# Patient Record
Sex: FEMALE | Race: WHITE | NOT HISPANIC OR LATINO
[De-identification: names, ages, dates, MRNs, and addresses within clinical notes are randomized per-mention and may not be internally consistent; named-entity substitution may affect disease eponyms.]

---

## 2017-03-28 ENCOUNTER — APPOINTMENT (OUTPATIENT)
Dept: HEART AND VASCULAR | Facility: CLINIC | Age: 72
End: 2017-03-28

## 2017-04-13 ENCOUNTER — APPOINTMENT (OUTPATIENT)
Dept: HEART AND VASCULAR | Facility: CLINIC | Age: 72
End: 2017-04-13

## 2017-04-13 VITALS
SYSTOLIC BLOOD PRESSURE: 130 MMHG | HEART RATE: 81 BPM | BODY MASS INDEX: 25.13 KG/M2 | DIASTOLIC BLOOD PRESSURE: 80 MMHG | HEIGHT: 60 IN | WEIGHT: 128 LBS

## 2017-04-14 LAB
T3FREE SERPL-MCNC: 2.05 PG/ML
T4 FREE SERPL-MCNC: 1.4 NG/DL

## 2017-05-25 RX ORDER — LOVASTATIN 20 MG/1
20 TABLET ORAL
Qty: 30 | Refills: 6 | Status: DISCONTINUED | COMMUNITY
Start: 2017-04-14 | End: 2017-05-25

## 2017-11-28 ENCOUNTER — APPOINTMENT (OUTPATIENT)
Dept: HEART AND VASCULAR | Facility: CLINIC | Age: 72
End: 2017-11-28
Payer: MEDICARE

## 2017-11-28 VITALS — SYSTOLIC BLOOD PRESSURE: 118 MMHG | DIASTOLIC BLOOD PRESSURE: 80 MMHG

## 2017-11-28 DIAGNOSIS — Z00.00 ENCOUNTER FOR GENERAL ADULT MEDICAL EXAMINATION W/OUT ABNORMAL FINDINGS: ICD-10-CM

## 2017-11-28 DIAGNOSIS — I25.10 ATHEROSCLEROTIC HEART DISEASE OF NATIVE CORONARY ARTERY W/OUT ANGINA PECTORIS: ICD-10-CM

## 2017-11-28 DIAGNOSIS — I20.9 ANGINA PECTORIS, UNSPECIFIED: ICD-10-CM

## 2017-11-28 DIAGNOSIS — C34.90 MALIGNANT NEOPLASM OF UNSPECIFIED PART OF UNSPECIFIED BRONCHUS OR LUNG: ICD-10-CM

## 2017-11-28 PROCEDURE — 93351 STRESS TTE COMPLETE: CPT

## 2017-11-28 PROCEDURE — 99214 OFFICE O/P EST MOD 30 MIN: CPT | Mod: 25

## 2017-11-29 LAB
ALBUMIN SERPL ELPH-MCNC: 4.7 G/DL
ALP BLD-CCNC: 105 U/L
ALT SERPL-CCNC: 29 U/L
ANION GAP SERPL CALC-SCNC: 19 MMOL/L
AST SERPL-CCNC: 25 U/L
BASOPHILS # BLD AUTO: 0.03 K/UL
BASOPHILS NFR BLD AUTO: 0.4 %
BILIRUB SERPL-MCNC: 1.1 MG/DL
BUN SERPL-MCNC: 18 MG/DL
CALCIUM SERPL-MCNC: 9.9 MG/DL
CHLORIDE SERPL-SCNC: 101 MMOL/L
CHOLEST SERPL-MCNC: 234 MG/DL
CHOLEST/HDLC SERPL: 4.4 RATIO
CO2 SERPL-SCNC: 22 MMOL/L
CREAT SERPL-MCNC: 0.88 MG/DL
EOSINOPHIL # BLD AUTO: 0.15 K/UL
EOSINOPHIL NFR BLD AUTO: 2.1 %
GLUCOSE SERPL-MCNC: 160 MG/DL
HBA1C MFR BLD HPLC: 8.8 %
HCT VFR BLD CALC: 43.6 %
HDLC SERPL-MCNC: 53 MG/DL
IMM GRANULOCYTES NFR BLD AUTO: 0 %
LDLC SERPL CALC-MCNC: 112 MG/DL
LYMPHOCYTES # BLD AUTO: 1.82 K/UL
LYMPHOCYTES NFR BLD AUTO: 25.2 %
MAN DIFF?: NORMAL
MCHC RBC-ENTMCNC: 28.4 PG
MCHC RBC-ENTMCNC: 32.8 GM/DL
MCV RBC AUTO: 86.5 FL
MONOCYTES # BLD AUTO: 0.38 K/UL
MONOCYTES NFR BLD AUTO: 5.3 %
NEUTROPHILS # BLD AUTO: 4.83 K/UL
NEUTROPHILS NFR BLD AUTO: 67 %
PLATELET # BLD AUTO: 302 K/UL
POTASSIUM SERPL-SCNC: 4.6 MMOL/L
PROT SERPL-MCNC: 7.4 G/DL
RBC # BLD: 5.04 M/UL
SODIUM SERPL-SCNC: 142 MMOL/L
TRIGL SERPL-MCNC: 345 MG/DL
TSH SERPL-ACNC: 1.3 UIU/ML
WBC # FLD AUTO: 7.21 K/UL

## 2017-11-30 PROBLEM — I20.9 ANGINA PECTORIS: Status: ACTIVE | Noted: 2017-04-13

## 2018-02-10 DIAGNOSIS — Z00.00 ENCOUNTER FOR GENERAL ADULT MEDICAL EXAMINATION W/OUT ABNORMAL FINDINGS: ICD-10-CM

## 2018-03-19 PROBLEM — Z00.00 PHYSICAL EXAM, ANNUAL: Status: ACTIVE | Noted: 2018-03-19

## 2018-03-27 ENCOUNTER — APPOINTMENT (OUTPATIENT)
Dept: HEART AND VASCULAR | Facility: CLINIC | Age: 73
End: 2018-03-27

## 2022-09-14 ENCOUNTER — APPOINTMENT (OUTPATIENT)
Dept: OPHTHALMOLOGY | Facility: CLINIC | Age: 77
End: 2022-09-14

## 2022-12-06 VITALS — HEIGHT: 61 IN | WEIGHT: 115.96 LBS

## 2022-12-06 NOTE — H&P ADULT - HISTORY OF PRESENT ILLNESS
Cardiologist: Dr. Olayinka Maravilla  Escort:  Pharmacy:  COVID:    *Verify Meds*  [facility] - no office note (call [phone])    78 yo F with a PMH of ____ who presented to outpatient cardiologist Dr. Maravilla with complaints of ______. CCTA 12/2/22:  Agatston CAC score 1383 (90th-100th percentile for age/gender), severe 3V CAD (full report below), indeterminate anterior RUL 7mm nodule, medial LLL calcifications and pleural thickening. TTE 8/25/22: LVEF normal, moderate LVH, diastolic filling pattern indicated impaired relaxation, nonsignificant valvular disease.     CCTA 12/2/22: Agatston CAC score 1383 (90th-100th percentile for age/gender), p/mLAD calcified/soft plaque w/ severe stenosis, D1 calcified and soft plauqes may cause severe stenosis, LCx , OM1 calcified and soft plauqes may cause severe stenosis, OM2 calcified and soft plauqes may cause severe stenosis, p/m/dRCA multifocal calcified and soft plauqes may cause severe stenosis, indeterminate anterior RUL 7mm nodule, medial LLL calcifications and pleural thickening Cardiologist: Dr. Olayinka Maravilla  Escort: TBD  Pharmacy:  [address] [place] [address]  COVID: Pt to bring     *Verify Meds*    78 yo F former smoker with a PMH of HLD (on Repatha, no statin), CAD (s/p PTCA in 2012), DM-II, Lung CA s/p partial lobectomy, Hypothyroidism who presented to outpatient cardiologist Dr. Maravilla with complaints of intermittent substernal sharp chest pain accompanied by WRIGHT with ambulating uphill for the past 6-9 months. Pt underwent CCTA 12/2/22: Agatston CAC score 1383 (90th-100th percentile for age/gender), severe 3V CAD (full report below), indeterminate anterior RUL 7mm nodule, medial LLL calcifications and pleural thickening. TTE 8/25/22: LVEF normal, moderate LVH, diastolic filling pattern indicated impaired relaxation, nonsignificant valvular disease. In light of pt's risk factors, CCS class III anginal symptoms and abnormal CCTA pt is referred to [facility] for cardiac catheterization with possible intervention.     CCTA 12/2/22: Agatston CAC score 1383 (90th-100th percentile for age/gender), p/mLAD calcified/soft plaque w/ severe stenosis, D1 calcified and soft plaques may cause severe stenosis, LCx , OM1 calcified and soft plaques may cause severe stenosis, OM2 calcified and soft plaques may cause severe stenosis, p/m/dRCA multifocal calcified and soft plaques may cause severe stenosis, indeterminate anterior RUL 7mm nodule, medial LLL calcifications and pleural thickening Cardiologist: Dr. Olayinka Maravilla  Escort: TBD  Pharmacy:  [address] [place] [address]  COVID: 12/7/22 @ [address]    *Verify Meds*    78 yo F former smoker with a PMH of HLD (on Repatha, no statin), CAD (s/p PTCA in 2012), DM-II, Lung CA s/p partial lobectomy, Hypothyroidism who presented to outpatient cardiologist Dr. Maravilla with complaints of intermittent substernal sharp chest pain accompanied by WRIGHT with ambulating uphill for the past 6-9 months. Pt underwent CCTA 12/2/22: Agatston CAC score 1383 (90th-100th percentile for age/gender), severe 3V CAD (full report below), indeterminate anterior RUL 7mm nodule, medial LLL calcifications and pleural thickening. TTE 8/25/22: LVEF normal, moderate LVH, diastolic filling pattern indicated impaired relaxation, nonsignificant valvular disease. In light of pt's risk factors, CCS class III anginal symptoms and abnormal CCTA pt is referred to [facility] for cardiac catheterization with possible intervention.     CCTA 12/2/22: Agatston CAC score 1383 (90th-100th percentile for age/gender), p/mLAD calcified/soft plaque w/ severe stenosis, D1 calcified and soft plaques may cause severe stenosis, LCx , OM1 calcified and soft plaques may cause severe stenosis, OM2 calcified and soft plaques may cause severe stenosis, p/m/dRCA multifocal calcified and soft plaques may cause severe stenosis, indeterminate anterior RUL 7mm nodule, medial LLL calcifications and pleural thickening Cardiologist: Dr. Olayinka Maravilla  Escort: None  Pharmacy:  [address] [facility] verified w/ pharmacy  COVID: Negative 12/9 in results    76 yo F, former heavy smoker, with a PMH of HLD (on Repatha, no statin), CAD (s/p PTCA in 2012), DM-II, Lung CA s/p partial lobectomy, Hypothyroidism who presented to outpatient cardiologist Dr. Maravilla with complaints of intermittent substernal sharp chest pain accompanied by WRIGHT with ambulating uphill for the past 6-9 months. Pt underwent CCTA 12/2/22: Agatston CAC score 1383 (90th-100th percentile for age/gender), severe 3V CAD (full report below), indeterminate anterior RUL 7mm nodule, medial LLL calcifications and pleural thickening. TTE 8/25/22: LVEF normal, moderate LVH, diastolic filling pattern indicated impaired relaxation, nonsignificant valvular disease. In light of pt's risk factors, CCS class III anginal symptoms and abnormal CCTA pt is referred to [facility] for cardiac catheterization with possible intervention if clinically indicated.     CCTA 12/2/22: Agatston CAC score 1383 (90th-100th percentile for age/gender), p/mLAD calcified/soft plaque w/ severe stenosis, D1 calcified and soft plaques may cause severe stenosis, LCx , OM1 calcified and soft plaques may cause severe stenosis, OM2 calcified and soft plaques may cause severe stenosis, p/m/dRCA multifocal calcified and soft plaques may cause severe stenosis, indeterminate anterior RUL 7mm nodule, medial LLL calcifications and pleural thickening

## 2022-12-06 NOTE — H&P ADULT - NSHPOUTPATIENTPROVIDERS_GEN_ALL_CORE
Instructions: This plan will send the code FBSE to the PM system.  DO NOT or CHANGE the price. Price (Do Not Change): 0.00 Detail Level: Simple Cardiologist: Dr. Olayinka Maravilla

## 2022-12-06 NOTE — H&P ADULT - ASSESSMENT
76 yo F, former heavy smoker, with a PMH of HLD (on Repatha, no statin), CAD (s/p PTCA in 2012), DM-II, Lung CA s/p partial lobectomy, Hypothyroidism who presented to outpatient cardiologist Dr. Maravilla with complaints of intermittent substernal sharp chest pain accompanied by WRIGHT with ambulating uphill for the past 6-9 months. CCTA 12/2/22: Agatston CAC score 1383 (90th-100th percentile for age/gender), severe 3V CAD, indeterminate anterior RUL 7mm nodule, medial LLL calcifications and pleural thickening. In light of pt's risk factors, CCS class III anginal symptoms and abnormal CCTA pt is referred to [facility] for cardiac catheterization with possible intervention if clinically indicated.     -H/H = 13.9/41.5. Pt denies BRBPR, hematuria, hematochezia, melena. As d/w Dr. Hall, no load given CCTA findings  -Cr = _____. EF normal. Euvolemic on exam. IVF with 250cc bolus x1 followed by NS @ 75cc/hr started pre procedure per protocol  -K+ 3.8 s/p 20meq KCl x1 and Mg 1.9 s/p 400mg Mg Ox x1 pre cath  -Type of sedation: moderate  -Candidate for sedation: yes     Risks & benefits of procedure and alternative therapy have been explained to the patient including but not limited to: allergic reaction, bleeding w/possible need for blood transfusion, infection, renal and vascular compromise, limb damage, arrhythmia, stroke, vessel dissection/perforation, Myocardial infarction, emergent CABG. Informed consent obtained and in chart.  78 yo F, former heavy smoker, with a PMH of HLD (on Repatha, no statin), CAD (s/p PTCA in 2012), DM-II, Lung CA s/p partial lobectomy, Hypothyroidism who presented to outpatient cardiologist Dr. Maravilla with complaints of intermittent substernal sharp chest pain accompanied by WRIGHT with ambulating uphill for the past 6-9 months. CCTA 12/2/22: Agatston CAC score 1383 (90th-100th percentile for age/gender), severe 3V CAD, indeterminate anterior RUL 7mm nodule, medial LLL calcifications and pleural thickening. In light of pt's risk factors, CCS class III anginal symptoms and abnormal CCTA pt is referred to [facility] for cardiac catheterization with possible intervention if clinically indicated.     -H/H = 13.9/41.5. Pt denies BRBPR, hematuria, hematochezia, melena. As d/w Dr. Hall, no load given CCTA findings  -Cr = 0.61. EF normal. Euvolemic on exam. IVF with 250cc bolus x1 followed by NS @ 75cc/hr started pre procedure per protocol  -K+ 3.8 s/p 20meq KCl x1 and Mg 1.9 s/p 400mg Mg Ox x1 pre cath  -Type of sedation: moderate  -Candidate for sedation: yes     Risks & benefits of procedure and alternative therapy have been explained to the patient including but not limited to: allergic reaction, bleeding w/possible need for blood transfusion, infection, renal and vascular compromise, limb damage, arrhythmia, stroke, vessel dissection/perforation, Myocardial infarction, emergent CABG. Informed consent obtained and in chart.

## 2022-12-09 ENCOUNTER — OUTPATIENT (OUTPATIENT)
Dept: OUTPATIENT SERVICES | Facility: HOSPITAL | Age: 77
LOS: 1 days | Discharge: HOME | End: 2022-12-09
Payer: MEDICARE

## 2022-12-09 ENCOUNTER — NON-APPOINTMENT (OUTPATIENT)
Age: 77
End: 2022-12-09

## 2022-12-09 ENCOUNTER — RESULT REVIEW (OUTPATIENT)
Age: 77
End: 2022-12-09

## 2022-12-09 DIAGNOSIS — Z98.890 OTHER SPECIFIED POSTPROCEDURAL STATES: Chronic | ICD-10-CM

## 2022-12-09 DIAGNOSIS — Z90.2 ACQUIRED ABSENCE OF LUNG [PART OF]: Chronic | ICD-10-CM

## 2022-12-09 LAB
A1C WITH ESTIMATED AVERAGE GLUCOSE RESULT: 9.2 % — HIGH (ref 4–5.6)
ALBUMIN SERPL ELPH-MCNC: 4.4 G/DL — SIGNIFICANT CHANGE UP (ref 3.3–5)
ALP SERPL-CCNC: 170 U/L — HIGH (ref 40–120)
ALT FLD-CCNC: 31 U/L — SIGNIFICANT CHANGE UP (ref 10–45)
ANION GAP SERPL CALC-SCNC: 12 MMOL/L — SIGNIFICANT CHANGE UP (ref 5–17)
APPEARANCE UR: CLEAR — SIGNIFICANT CHANGE UP
APTT BLD: 28.3 SEC — SIGNIFICANT CHANGE UP (ref 27.5–35.5)
AST SERPL-CCNC: 23 U/L — SIGNIFICANT CHANGE UP (ref 10–40)
BACTERIA # UR AUTO: SIGNIFICANT CHANGE UP /HPF
BASOPHILS # BLD AUTO: 0.03 K/UL — SIGNIFICANT CHANGE UP (ref 0–0.2)
BASOPHILS NFR BLD AUTO: 0.4 % — SIGNIFICANT CHANGE UP (ref 0–2)
BILIRUB SERPL-MCNC: 1.1 MG/DL — SIGNIFICANT CHANGE UP (ref 0.2–1.2)
BILIRUB UR-MCNC: NEGATIVE — SIGNIFICANT CHANGE UP
BLD GP AB SCN SERPL QL: NEGATIVE — SIGNIFICANT CHANGE UP
BUN SERPL-MCNC: 14 MG/DL — SIGNIFICANT CHANGE UP (ref 7–23)
CALCIUM SERPL-MCNC: 9.4 MG/DL — SIGNIFICANT CHANGE UP (ref 8.4–10.5)
CHLORIDE SERPL-SCNC: 101 MMOL/L — SIGNIFICANT CHANGE UP (ref 96–108)
CHOLEST SERPL-MCNC: 369 MG/DL — HIGH
CK MB CFR SERPL CALC: 2.6 NG/ML — SIGNIFICANT CHANGE UP (ref 0–6.7)
CK SERPL-CCNC: 47 U/L — SIGNIFICANT CHANGE UP (ref 25–170)
CO2 SERPL-SCNC: 25 MMOL/L — SIGNIFICANT CHANGE UP (ref 22–31)
COLOR SPEC: YELLOW — SIGNIFICANT CHANGE UP
CREAT SERPL-MCNC: 0.61 MG/DL — SIGNIFICANT CHANGE UP (ref 0.5–1.3)
DIFF PNL FLD: ABNORMAL
EGFR: 91 ML/MIN/1.73M2 — SIGNIFICANT CHANGE UP
EOSINOPHIL # BLD AUTO: 0.07 K/UL — SIGNIFICANT CHANGE UP (ref 0–0.5)
EOSINOPHIL NFR BLD AUTO: 1 % — SIGNIFICANT CHANGE UP (ref 0–6)
EPI CELLS # UR: SIGNIFICANT CHANGE UP /HPF (ref 0–5)
ESTIMATED AVERAGE GLUCOSE: 217 MG/DL — HIGH (ref 68–114)
GLUCOSE BLDC GLUCOMTR-MCNC: 115 MG/DL — HIGH (ref 70–99)
GLUCOSE BLDC GLUCOMTR-MCNC: 203 MG/DL — HIGH (ref 70–99)
GLUCOSE SERPL-MCNC: 207 MG/DL — HIGH (ref 70–99)
GLUCOSE UR QL: NEGATIVE — SIGNIFICANT CHANGE UP
HCT VFR BLD CALC: 41.5 % — SIGNIFICANT CHANGE UP (ref 34.5–45)
HDLC SERPL-MCNC: 68 MG/DL — SIGNIFICANT CHANGE UP
HGB BLD-MCNC: 13.9 G/DL — SIGNIFICANT CHANGE UP (ref 11.5–15.5)
IMM GRANULOCYTES NFR BLD AUTO: 0.1 % — SIGNIFICANT CHANGE UP (ref 0–0.9)
INR BLD: 0.96 — SIGNIFICANT CHANGE UP (ref 0.88–1.16)
KETONES UR-MCNC: NEGATIVE — SIGNIFICANT CHANGE UP
LEUKOCYTE ESTERASE UR-ACNC: NEGATIVE — SIGNIFICANT CHANGE UP
LIPID PNL WITH DIRECT LDL SERPL: 259 MG/DL — HIGH
LYMPHOCYTES # BLD AUTO: 1.72 K/UL — SIGNIFICANT CHANGE UP (ref 1–3.3)
LYMPHOCYTES # BLD AUTO: 24.2 % — SIGNIFICANT CHANGE UP (ref 13–44)
MAGNESIUM SERPL-MCNC: 1.9 MG/DL — SIGNIFICANT CHANGE UP (ref 1.6–2.6)
MCHC RBC-ENTMCNC: 28.7 PG — SIGNIFICANT CHANGE UP (ref 27–34)
MCHC RBC-ENTMCNC: 33.5 GM/DL — SIGNIFICANT CHANGE UP (ref 32–36)
MCV RBC AUTO: 85.6 FL — SIGNIFICANT CHANGE UP (ref 80–100)
MONOCYTES # BLD AUTO: 0.39 K/UL — SIGNIFICANT CHANGE UP (ref 0–0.9)
MONOCYTES NFR BLD AUTO: 5.5 % — SIGNIFICANT CHANGE UP (ref 2–14)
NEUTROPHILS # BLD AUTO: 4.9 K/UL — SIGNIFICANT CHANGE UP (ref 1.8–7.4)
NEUTROPHILS NFR BLD AUTO: 68.8 % — SIGNIFICANT CHANGE UP (ref 43–77)
NITRITE UR-MCNC: NEGATIVE — SIGNIFICANT CHANGE UP
NON HDL CHOLESTEROL: 301 MG/DL — HIGH
NRBC # BLD: 0 /100 WBCS — SIGNIFICANT CHANGE UP (ref 0–0)
PH UR: 6.5 — SIGNIFICANT CHANGE UP (ref 5–8)
PLATELET # BLD AUTO: 257 K/UL — SIGNIFICANT CHANGE UP (ref 150–400)
POTASSIUM SERPL-MCNC: 3.8 MMOL/L — SIGNIFICANT CHANGE UP (ref 3.5–5.3)
POTASSIUM SERPL-SCNC: 3.8 MMOL/L — SIGNIFICANT CHANGE UP (ref 3.5–5.3)
PROT SERPL-MCNC: 7.5 G/DL — SIGNIFICANT CHANGE UP (ref 6–8.3)
PROT UR-MCNC: NEGATIVE MG/DL — SIGNIFICANT CHANGE UP
PROTHROM AB SERPL-ACNC: 11.4 SEC — SIGNIFICANT CHANGE UP (ref 10.5–13.4)
RBC # BLD: 4.85 M/UL — SIGNIFICANT CHANGE UP (ref 3.8–5.2)
RBC # FLD: 13.2 % — SIGNIFICANT CHANGE UP (ref 10.3–14.5)
RBC CASTS # UR COMP ASSIST: ABNORMAL /HPF
RH IG SCN BLD-IMP: POSITIVE — SIGNIFICANT CHANGE UP
SODIUM SERPL-SCNC: 138 MMOL/L — SIGNIFICANT CHANGE UP (ref 135–145)
SP GR SPEC: <=1.005 — SIGNIFICANT CHANGE UP (ref 1–1.03)
TRIGL SERPL-MCNC: 210 MG/DL — HIGH
TSH SERPL-MCNC: 2.26 UIU/ML — SIGNIFICANT CHANGE UP (ref 0.27–4.2)
UROBILINOGEN FLD QL: 0.2 E.U./DL — SIGNIFICANT CHANGE UP
WBC # BLD: 7.12 K/UL — SIGNIFICANT CHANGE UP (ref 3.8–10.5)
WBC # FLD AUTO: 7.12 K/UL — SIGNIFICANT CHANGE UP (ref 3.8–10.5)
WBC UR QL: < 5 /HPF — SIGNIFICANT CHANGE UP

## 2022-12-09 PROCEDURE — 93458 L HRT ARTERY/VENTRICLE ANGIO: CPT

## 2022-12-09 PROCEDURE — 93458 L HRT ARTERY/VENTRICLE ANGIO: CPT | Mod: 26

## 2022-12-09 PROCEDURE — C1769: CPT

## 2022-12-09 PROCEDURE — 80061 LIPID PANEL: CPT

## 2022-12-09 PROCEDURE — 86850 RBC ANTIBODY SCREEN: CPT

## 2022-12-09 PROCEDURE — 85730 THROMBOPLASTIN TIME PARTIAL: CPT

## 2022-12-09 PROCEDURE — 85610 PROTHROMBIN TIME: CPT

## 2022-12-09 PROCEDURE — 36415 COLL VENOUS BLD VENIPUNCTURE: CPT

## 2022-12-09 PROCEDURE — 86900 BLOOD TYPING SEROLOGIC ABO: CPT

## 2022-12-09 PROCEDURE — 93010 ELECTROCARDIOGRAM REPORT: CPT

## 2022-12-09 PROCEDURE — 71045 X-RAY EXAM CHEST 1 VIEW: CPT | Mod: 26

## 2022-12-09 PROCEDURE — 71045 X-RAY EXAM CHEST 1 VIEW: CPT

## 2022-12-09 PROCEDURE — 82550 ASSAY OF CK (CPK): CPT

## 2022-12-09 PROCEDURE — 80053 COMPREHEN METABOLIC PANEL: CPT

## 2022-12-09 PROCEDURE — C1894: CPT

## 2022-12-09 PROCEDURE — 82962 GLUCOSE BLOOD TEST: CPT

## 2022-12-09 PROCEDURE — 94150 VITAL CAPACITY TEST: CPT

## 2022-12-09 PROCEDURE — 82553 CREATINE MB FRACTION: CPT

## 2022-12-09 PROCEDURE — 93005 ELECTROCARDIOGRAM TRACING: CPT

## 2022-12-09 PROCEDURE — 83735 ASSAY OF MAGNESIUM: CPT

## 2022-12-09 PROCEDURE — 86901 BLOOD TYPING SEROLOGIC RH(D): CPT

## 2022-12-09 PROCEDURE — 81001 URINALYSIS AUTO W/SCOPE: CPT

## 2022-12-09 PROCEDURE — 94010 BREATHING CAPACITY TEST: CPT | Mod: 26

## 2022-12-09 PROCEDURE — 83036 HEMOGLOBIN GLYCOSYLATED A1C: CPT

## 2022-12-09 PROCEDURE — C1887: CPT

## 2022-12-09 PROCEDURE — 85025 COMPLETE CBC W/AUTO DIFF WBC: CPT

## 2022-12-09 PROCEDURE — 93880 EXTRACRANIAL BILAT STUDY: CPT

## 2022-12-09 PROCEDURE — 84443 ASSAY THYROID STIM HORMONE: CPT

## 2022-12-09 PROCEDURE — 93880 EXTRACRANIAL BILAT STUDY: CPT | Mod: 26

## 2022-12-09 RX ORDER — CHLORHEXIDINE GLUCONATE 213 G/1000ML
1 SOLUTION TOPICAL ONCE
Refills: 0 | Status: DISCONTINUED | OUTPATIENT
Start: 2022-12-09 | End: 2022-12-09

## 2022-12-09 RX ORDER — METOPROLOL TARTRATE 50 MG
1 TABLET ORAL
Qty: 30 | Refills: 0
Start: 2022-12-09 | End: 2023-01-07

## 2022-12-09 RX ORDER — POTASSIUM CHLORIDE 20 MEQ
20 PACKET (EA) ORAL ONCE
Refills: 0 | Status: COMPLETED | OUTPATIENT
Start: 2022-12-09 | End: 2022-12-09

## 2022-12-09 RX ORDER — SODIUM CHLORIDE 9 MG/ML
500 INJECTION INTRAMUSCULAR; INTRAVENOUS; SUBCUTANEOUS
Refills: 0 | Status: DISCONTINUED | OUTPATIENT
Start: 2022-12-09 | End: 2022-12-09

## 2022-12-09 RX ORDER — SODIUM CHLORIDE 9 MG/ML
250 INJECTION INTRAMUSCULAR; INTRAVENOUS; SUBCUTANEOUS ONCE
Refills: 0 | Status: COMPLETED | OUTPATIENT
Start: 2022-12-09 | End: 2022-12-09

## 2022-12-09 RX ORDER — SODIUM CHLORIDE 9 MG/ML
500 INJECTION INTRAMUSCULAR; INTRAVENOUS; SUBCUTANEOUS
Refills: 0 | Status: DISCONTINUED | OUTPATIENT
Start: 2022-12-09 | End: 2022-12-23

## 2022-12-09 RX ORDER — MAGNESIUM OXIDE 400 MG ORAL TABLET 241.3 MG
400 TABLET ORAL ONCE
Refills: 0 | Status: COMPLETED | OUTPATIENT
Start: 2022-12-09 | End: 2022-12-09

## 2022-12-09 RX ORDER — METFORMIN HYDROCHLORIDE 850 MG/1
1 TABLET ORAL
Qty: 0 | Refills: 0 | DISCHARGE

## 2022-12-09 RX ADMIN — SODIUM CHLORIDE 75 MILLILITER(S): 9 INJECTION INTRAMUSCULAR; INTRAVENOUS; SUBCUTANEOUS at 10:23

## 2022-12-09 RX ADMIN — SODIUM CHLORIDE 500 MILLILITER(S): 9 INJECTION INTRAMUSCULAR; INTRAVENOUS; SUBCUTANEOUS at 10:15

## 2022-12-09 RX ADMIN — Medication 20 MILLIEQUIVALENT(S): at 10:48

## 2022-12-09 RX ADMIN — MAGNESIUM OXIDE 400 MG ORAL TABLET 400 MILLIGRAM(S): 241.3 TABLET ORAL at 10:48

## 2022-12-09 NOTE — PROGRESS NOTE ADULT - SUBJECTIVE AND OBJECTIVE BOX
Interventional Cardiology PA SDA Discharge Note    Patient without complaints. Ambulated and voided without difficulties    Afebrile, VSS    Ext: Right Radial: no hematoma, no bleeding, dressing; C/D/I    Pulses: intact RAD/DP/PT to baseline     A/P:  76 yo F, former heavy smoker, with a PMH of HLD (on Repatha, no statin), CAD (s/p PTCA in 2012), DM-II, Lung CA s/p partial lobectomy, and Hypothyroidism, presents to [facility] for recommended cardiac cath w/ possible intervention in light of pts risk factors, CCSl ass III anginal symptoms and abnormal CCTS. Pt now s/p dx cardiac cath 12/9 revealing 3vCAD; ______, access R radial TR band applied. CTS consulted and plan for _____.    1.	Stable for discharge as per attending Dr. Hall after bed rest, pt voids, wrist stable and 30 minutes of ambulation.  2.	Follow-up with PMD/Cardiologist Dr. Whittaker in 1-2 weeks  3.	Discharged forms signed and copies in chart    Interventional Cardiology PA SDA Discharge Note    Patient without complaints. Ambulated and voided without difficulties    Afebrile, VSS    Ext: Right Radial: no hematoma, no bleeding, dressing; C/D/I    Pulses: intact RAD/DP/PT to baseline     A/P:  76 yo F, former heavy smoker, with a PMH of HLD (on Repatha, no statin), CAD (s/p PTCA in 2012), DM-II, Lung CA s/p partial lobectomy, and Hypothyroidism, presents to [facility] for recommended cardiac cath w/ possible intervention in light of pts risk factors, CCSl ass III anginal symptoms and abnormal CCTS. Pt now s/p dx cardiac cath 12/9 revealing 3vCAD; mRCA 99%, dRCA 100%, RPDA ____, dLM 30%, OM1 mild diffuse, , access R radial TR band applied. CTS consulted and plan for _____.    1.	Stable for discharge as per attending Dr. Hall after bed rest, pt voids, wrist stable and 30 minutes of ambulation.  2.	Follow-up with PMD/Cardiologist Dr. Whittaker in 1-2 weeks  3.	Discharged forms signed and copies in chart    Interventional Cardiology PA SDA Discharge Note    Patient without complaints. Ambulated and voided without difficulties    Afebrile, VSS    Ext: Right Radial: no hematoma, no bleeding, dressing; C/D/I    Pulses: intact RAD/DP/PT to baseline     A/P:  76 yo F, former heavy smoker, with a PMH of HLD (on Repatha, no statin), CAD (s/p PTCA in 2012), DM-II, Lung CA s/p partial lobectomy, and Hypothyroidism, presents to [facility] for recommended cardiac cath w/ possible intervention in light of pts risk factors, CCSl ass III anginal symptoms and abnormal CCTS. Pt now s/p dx cardiac cath 12/9 revealing 3vCAD; mRCA 99%, dRCA 100%, dLM 30%, pLCx 90%, OM1 mild diffuse, pLAD 30%, mLAD 80% (large), EDP 11, LVEF 50%, access R radial TR band applied. CTS consulted, plan for pt to return on Thursday for CTS w/ Dr. Garces. Pt started on Toprol 25mg QD post cath.    1.	Stable for discharge as per attending Dr. Hall after bed rest, pt voids, wrist stable and 30 minutes of ambulation.  2.	Follow-up with PMD/Cardiologist Dr. Whittaker in 1-2 weeks  3.	Discharged forms signed and copies in chart    Interventional Cardiology PA SDA Discharge Note    Patient without complaints. Ambulated and voided without difficulties    Afebrile, VSS    Ext: Right Radial: no hematoma, no bleeding, dressing; C/D/I    Pulses: intact RAD/DP/PT to baseline     A/P:  76 yo F, former heavy smoker, with a PMH of HLD (on Repatha, no statin), CAD (s/p PTCA in 2012), DM-II, Lung CA s/p partial lobectomy, and Hypothyroidism, presents to [facility] for recommended cardiac cath w/ possible intervention in light of pts risk factors, CCSl ass III anginal symptoms and abnormal CCTS. Pt now s/p dx cardiac cath 12/9 revealing 3vCAD; mRCA 99%, dRCA 100%, dLM 30%, pLCx 90%, OM1 mild diffuse, pLAD 30%, mLAD 80% (large), EDP 11, LVEF 50%, access R radial TR band applied. CTS consulted, plan for pt to return on 12/15/22 for CTS w/ Dr. Garces. Pt started on Toprol 25mg QD post cath.    1.	Stable for discharge as per attending Dr. Hall after bed rest, pt voids, wrist stable and 30 minutes of ambulation.  2.	Follow-up with PMD/Cardiologist Dr. Whittaker in 1-2 weeks  3.	Discharged forms signed and copies in chart

## 2022-12-10 PROBLEM — C34.90 MALIGNANT NEOPLASM OF UNSPECIFIED PART OF UNSPECIFIED BRONCHUS OR LUNG: Chronic | Status: ACTIVE | Noted: 2022-12-08

## 2022-12-10 PROBLEM — E11.9 TYPE 2 DIABETES MELLITUS WITHOUT COMPLICATIONS: Chronic | Status: ACTIVE | Noted: 2022-12-08

## 2022-12-10 PROBLEM — E03.9 HYPOTHYROIDISM, UNSPECIFIED: Chronic | Status: ACTIVE | Noted: 2022-12-08

## 2022-12-10 PROBLEM — E78.5 HYPERLIPIDEMIA, UNSPECIFIED: Chronic | Status: ACTIVE | Noted: 2022-12-08

## 2022-12-13 ENCOUNTER — APPOINTMENT (OUTPATIENT)
Dept: CARDIOTHORACIC SURGERY | Facility: CLINIC | Age: 77
End: 2022-12-13
Payer: MEDICARE

## 2022-12-13 ENCOUNTER — NON-APPOINTMENT (OUTPATIENT)
Age: 77
End: 2022-12-13

## 2022-12-13 PROCEDURE — 99205 OFFICE O/P NEW HI 60 MIN: CPT

## 2022-12-13 RX ORDER — PNEUMOCOCCAL 13-VALENT CONJUGATE VACCINE 2.2; 2.2; 2.2; 2.2; 2.2; 4.4; 2.2; 2.2; 2.2; 2.2; 2.2; 2.2; 2.2 UG/.5ML; UG/.5ML; UG/.5ML; UG/.5ML; UG/.5ML; UG/.5ML; UG/.5ML; UG/.5ML; UG/.5ML; UG/.5ML; UG/.5ML; UG/.5ML; UG/.5ML
INJECTION, SUSPENSION INTRAMUSCULAR
Qty: 1 | Refills: 0 | Status: COMPLETED | COMMUNITY
Start: 2017-11-28 | End: 2022-12-13

## 2022-12-13 RX ORDER — EVOLOCUMAB 140 MG/ML
140 INJECTION, SOLUTION SUBCUTANEOUS
Qty: 1 | Refills: 3 | Status: ACTIVE | COMMUNITY

## 2022-12-14 ENCOUNTER — TRANSCRIPTION ENCOUNTER (OUTPATIENT)
Age: 77
End: 2022-12-14

## 2022-12-14 VITALS
WEIGHT: 115.96 LBS | SYSTOLIC BLOOD PRESSURE: 184 MMHG | OXYGEN SATURATION: 95 % | HEART RATE: 78 BPM | RESPIRATION RATE: 18 BRPM | DIASTOLIC BLOOD PRESSURE: 84 MMHG | HEIGHT: 61 IN | TEMPERATURE: 98 F

## 2022-12-14 RX ORDER — PANTOPRAZOLE SODIUM 20 MG/1
1 TABLET, DELAYED RELEASE ORAL
Qty: 0 | Refills: 0 | DISCHARGE

## 2022-12-14 RX ORDER — GLIMEPIRIDE 1 MG
1 TABLET ORAL
Qty: 0 | Refills: 0 | DISCHARGE

## 2022-12-14 NOTE — PRE-OP CHECKLIST - SELECT TESTS ORDERED
CBC/CMP/PT/PTT/INR/Type and Screen/EKG/CXR CBC/CMP/PT/PTT/INR/Type and Screen/EKG/CXR/COVID-19 A1C/CBC/CMP/PT/PTT/INR/Type and Screen/EKG/CXR/COVID-19

## 2022-12-14 NOTE — H&P ADULT - NSHPLABSRESULTS_GEN_ALL_CORE
Hgb A1C =9.2  creat = 0.61  hct= 41.5  hgb= 13.9  plt= 257  WBC= 7.12  INR=0.96  tot alb= 4.4  tot bili= 1.1    TSH= 2.260    12/9/22 Chest xray: clear lungs    12/9/22 EKG: SR, 74 bpm    12/9/22 Carotid US: No significant hemodynamically stenosis of either carotid artery. mild calcified and noncalcified atherosclerotic plaque    12/9/22 PFT's:    12/22/22 CCTA: Agatston CAC score 1383 (90th-100th percentile for age/gender), p/mLAD calcified/soft plaque w/ severe stenosis, D1 calcified and soft plaques may cause severe stenosis, LCx , OM1 calcified and soft plaques may cause severe stenosis, OM2 calcified and soft plaques may cause severe stenosis, p/m/dRCA multifocal calcified and soft plaques may cause severe stenosis, indeterminate anterior RUL 7mm nodule, medial LLL calcifications and pleural thickening    8/25/22 Echo: LVEF normal, moderate LVH, diastolic filling pattern indicated impaired relaxation, nonsignificant valvular disease.    12/9/22 Cardiac cath: 3vCAD; mRCA 99%, dRCA 100%, dLM 30%, pLCx 90%, OM1 mild diffuse, pLAD 30%, mLAD 80% (large), EDP 11, LVEF 50%.

## 2022-12-14 NOTE — H&P ADULT - RESPIRATORY RATE (BREATHS/MIN)
Patient states she was being treated for H. Pylori with antibiotics and developed an allergic reaction. Patient states she was told to dc antibiotics. Patient states she has had a hives rash since 6/5/17. She was seen in the ER on 6/7/17. Patient states she was experiencing a funny feeing in her tongue, and either mucous or swelling in the throat so she went back to the ER this AM. Patient states she was treated and told to schedule a follow up visit with her PCP. Patient states she is just leaving the ER now. She is requesting to schedule a follow up appointment for today. Appointment scheduled for today at  2:00 pm with Noelle Zaidi PA-C, as Dr. Oscar ayoubs out of the office today. Suggested for patient to call back or go to ER if symptoms worsen. Warning signs discussed. Patient verbalized understanding.    18

## 2022-12-14 NOTE — PATIENT PROFILE ADULT - FALL HARM RISK - HARM RISK INTERVENTIONS

## 2022-12-14 NOTE — H&P ADULT - ASSESSMENT
76 yo female, former heavy smoker presents with a PMH of HLD, CAD (s/p PTCA in 2012), DM-II, left Lung CA s/p partial lobectomy and Hypothyroidism with class III angina with severe 3 vessel CAD. Dr. Garces reviewed the cardiac cath imaging and reports with the patient and discussed the case with  and Dr. Maravilla. Dr. Garces performed the STS risk calculator and quoted a () operative mortality and complication risk and discussed the STS risk factors with the patient including but not limited to death, heart attack, bleeding, stroke, kidney problems and infection. He also discussed the various approaches, minimally invasive versus sternotomy. Dr. Garces feels the patient will benefit and is a candidate for off pump CABG. All questions were addressed and patient agrees to proceed with surgery.    Plan:   PST  SDA 12/15  Pt instructed to take metoprolol and levothyroxine the morning of surgery  Instructions provided re antibacterial showers and pt given 3 sponges  **consult endocrine postop   76 yo female, former heavy smoker presents with a PMH of HLD, CAD (s/p PTCA in 2012), DM-II, left Lung CA s/p partial lobectomy and Hypothyroidism with class III angina with severe 3 vessel CAD. Dr. Garces reviewed the cardiac cath imaging and reports with the patient and discussed the case with  and Dr. Maravilla. Dr. Garces performed the STS risk calculator and quoted a () operative mortality and complication risk and discussed the STS risk factors with the patient including but not limited to death, heart attack, bleeding, stroke, kidney problems and infection. He also discussed the various approaches, minimally invasive versus sternotomy. Dr. Garces feels the patient will benefit and is a candidate for off pump CABG. All questions were addressed and patient agrees to proceed with surgery.    Plan:   PST  SDA 12/15  Pt instructed to take metoprolol and levothyroxine the morning of surgery  Instructions provided re antibacterial showers and pt given 3 sponges  **consult endocrine postop    Admit under Dr. Garces via same day surgery. Consent signed, placed on chart.  Risks/benefits reviewed, patient understands and agrees. T&S ordered and blood products placed on hold for OR.  To 9  post-op.

## 2022-12-14 NOTE — H&P ADULT - HISTORY OF PRESENT ILLNESS
76 yo female, former heavy smoker presents with a PMH of HLD (on Repatha, no statin), CAD (s/p PTCA in 2012), DM-II, left Lung CA s/p partial lobectomy and Hypothyroidism with class III angina. She presented to outpatient cardiologist Dr. Maravilla with complaints of intermittent substernal sharp chest pain accompanied by WRIGHT with ambulating uphill for the past 6-9 months. 8/25/22 Echo revealed normal LVEF, no valvular disease. 12/2/22 CCTA revealed 3 vessel CAD w/calcium score 1383. 12/9/22 Cardiac cath revealed severe 3 vessel CAD.     Patient was seen in the out patient office by Dr. Garces and now presents for elective surgery. 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      76 yo female, former heavy smoker presents with a PMH of HLD (on Repatha, no statin), CAD (s/p PTCA in 2012), DM-II, left Lung CA s/p partial lobectomy and Hypothyroidism with class III angina. She presented to outpatient cardiologist Dr. Maravilla with complaints of intermittent substernal sharp chest pain accompanied by WRIGHT with ambulating uphill for the past 6-9 months. 8/25/22 Echo revealed normal LVEF, no valvular disease. 12/2/22 CCTA revealed 3 vessel CAD w/calcium score 1383. 12/9/22 Cardiac cath revealed severe 3 vessel CAD.     Patient was seen in the out patient office by Dr. Garces and now presents for elective surgery. 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     Patient seen in same day holding area; Reports that she was recently prescribed Metformin and Glyburide from her Endocrinologist a few days ago, but she has not yet taken it. Denies acute or current SOB, chest pain, palpitation, N/V/D, fever/chills, recent illness, or any other concerning symptoms.

## 2022-12-15 ENCOUNTER — APPOINTMENT (OUTPATIENT)
Dept: CARDIOTHORACIC SURGERY | Facility: HOSPITAL | Age: 77
End: 2022-12-15

## 2022-12-15 ENCOUNTER — INPATIENT (INPATIENT)
Facility: HOSPITAL | Age: 77
LOS: 5 days | Discharge: NURSING, REHAB OR LONG-TERM CARE | DRG: 235 | End: 2022-12-21
Attending: THORACIC SURGERY (CARDIOTHORACIC VASCULAR SURGERY) | Admitting: THORACIC SURGERY (CARDIOTHORACIC VASCULAR SURGERY)
Payer: MEDICARE

## 2022-12-15 DIAGNOSIS — Z98.890 OTHER SPECIFIED POSTPROCEDURAL STATES: Chronic | ICD-10-CM

## 2022-12-15 DIAGNOSIS — Z90.2 ACQUIRED ABSENCE OF LUNG [PART OF]: Chronic | ICD-10-CM

## 2022-12-15 LAB
ALBUMIN SERPL ELPH-MCNC: 3.7 G/DL — SIGNIFICANT CHANGE UP (ref 3.3–5)
ALP SERPL-CCNC: 110 U/L — SIGNIFICANT CHANGE UP (ref 40–120)
ALT FLD-CCNC: 22 U/L — SIGNIFICANT CHANGE UP (ref 10–45)
ANION GAP SERPL CALC-SCNC: 10 MMOL/L — SIGNIFICANT CHANGE UP (ref 5–17)
APTT BLD: 24.6 SEC — LOW (ref 27.5–35.5)
AST SERPL-CCNC: 28 U/L — SIGNIFICANT CHANGE UP (ref 10–40)
BASE EXCESS BLDA CALC-SCNC: -1.5 MMOL/L — SIGNIFICANT CHANGE UP (ref -2–3)
BASE EXCESS BLDA CALC-SCNC: -2 MMOL/L — SIGNIFICANT CHANGE UP (ref -2–3)
BASE EXCESS BLDA CALC-SCNC: -2.2 MMOL/L — LOW (ref -2–3)
BASE EXCESS BLDA CALC-SCNC: -2.5 MMOL/L — LOW (ref -2–3)
BASE EXCESS BLDA CALC-SCNC: -3.7 MMOL/L — LOW (ref -2–3)
BASE EXCESS BLDA CALC-SCNC: -4.7 MMOL/L — LOW (ref -2–3)
BASE EXCESS BLDA CALC-SCNC: -4.9 MMOL/L — LOW (ref -2–3)
BASE EXCESS BLDA CALC-SCNC: -7 MMOL/L — LOW (ref -2–3)
BASOPHILS # BLD AUTO: 0.04 K/UL — SIGNIFICANT CHANGE UP (ref 0–0.2)
BASOPHILS NFR BLD AUTO: 0.3 % — SIGNIFICANT CHANGE UP (ref 0–2)
BILIRUB SERPL-MCNC: 1.1 MG/DL — SIGNIFICANT CHANGE UP (ref 0.2–1.2)
BLD GP AB SCN SERPL QL: NEGATIVE — SIGNIFICANT CHANGE UP
BUN SERPL-MCNC: 11 MG/DL — SIGNIFICANT CHANGE UP (ref 7–23)
CA-I BLDA-SCNC: 1.07 MMOL/L — LOW (ref 1.15–1.33)
CA-I BLDA-SCNC: 1.07 MMOL/L — LOW (ref 1.15–1.33)
CA-I BLDA-SCNC: 1.08 MMOL/L — LOW (ref 1.15–1.33)
CA-I BLDA-SCNC: 1.08 MMOL/L — LOW (ref 1.15–1.33)
CA-I BLDA-SCNC: 1.09 MMOL/L — LOW (ref 1.15–1.33)
CA-I BLDA-SCNC: 1.12 MMOL/L — LOW (ref 1.15–1.33)
CA-I BLDA-SCNC: 1.13 MMOL/L — LOW (ref 1.15–1.33)
CA-I BLDA-SCNC: 1.17 MMOL/L — SIGNIFICANT CHANGE UP (ref 1.15–1.33)
CALCIUM SERPL-MCNC: 8.6 MG/DL — SIGNIFICANT CHANGE UP (ref 8.4–10.5)
CHLORIDE SERPL-SCNC: 105 MMOL/L — SIGNIFICANT CHANGE UP (ref 96–108)
CO2 BLDA-SCNC: 19 MMOL/L — SIGNIFICANT CHANGE UP (ref 19–24)
CO2 BLDA-SCNC: 20 MMOL/L — SIGNIFICANT CHANGE UP (ref 19–24)
CO2 BLDA-SCNC: 21 MMOL/L — SIGNIFICANT CHANGE UP (ref 19–24)
CO2 BLDA-SCNC: 21 MMOL/L — SIGNIFICANT CHANGE UP (ref 19–24)
CO2 BLDA-SCNC: 23 MMOL/L — SIGNIFICANT CHANGE UP (ref 19–24)
CO2 BLDA-SCNC: 23 MMOL/L — SIGNIFICANT CHANGE UP (ref 19–24)
CO2 BLDA-SCNC: 24 MMOL/L — SIGNIFICANT CHANGE UP (ref 19–24)
CO2 BLDA-SCNC: 26 MMOL/L — HIGH (ref 19–24)
CO2 SERPL-SCNC: 24 MMOL/L — SIGNIFICANT CHANGE UP (ref 22–31)
COHGB MFR BLDA: 0 % — SIGNIFICANT CHANGE UP
COHGB MFR BLDA: 0.1 % — SIGNIFICANT CHANGE UP
COHGB MFR BLDA: 0.2 % — SIGNIFICANT CHANGE UP
COHGB MFR BLDA: 0.3 % — SIGNIFICANT CHANGE UP
CREAT SERPL-MCNC: 0.54 MG/DL — SIGNIFICANT CHANGE UP (ref 0.5–1.3)
EGFR: 94 ML/MIN/1.73M2 — SIGNIFICANT CHANGE UP
EOSINOPHIL # BLD AUTO: 0.04 K/UL — SIGNIFICANT CHANGE UP (ref 0–0.5)
EOSINOPHIL NFR BLD AUTO: 0.3 % — SIGNIFICANT CHANGE UP (ref 0–6)
GAS PNL BLDA: SIGNIFICANT CHANGE UP
GAS PNL BLDA: SIGNIFICANT CHANGE UP
GLUCOSE BLDA-MCNC: 148 MG/DL — HIGH (ref 70–99)
GLUCOSE BLDA-MCNC: 151 MG/DL — HIGH (ref 70–99)
GLUCOSE BLDA-MCNC: 152 MG/DL — HIGH (ref 70–99)
GLUCOSE BLDA-MCNC: 161 MG/DL — HIGH (ref 70–99)
GLUCOSE BLDA-MCNC: 179 MG/DL — HIGH (ref 70–99)
GLUCOSE BLDA-MCNC: 190 MG/DL — HIGH (ref 70–99)
GLUCOSE BLDA-MCNC: 206 MG/DL — HIGH (ref 70–99)
GLUCOSE BLDA-MCNC: 220 MG/DL — HIGH (ref 70–99)
GLUCOSE BLDC GLUCOMTR-MCNC: 183 MG/DL — HIGH (ref 70–99)
GLUCOSE BLDC GLUCOMTR-MCNC: 200 MG/DL — HIGH (ref 70–99)
GLUCOSE BLDC GLUCOMTR-MCNC: 231 MG/DL — HIGH (ref 70–99)
GLUCOSE BLDC GLUCOMTR-MCNC: 237 MG/DL — HIGH (ref 70–99)
GLUCOSE SERPL-MCNC: 257 MG/DL — HIGH (ref 70–99)
HCO3 BLDA-SCNC: 18 MMOL/L — LOW (ref 21–28)
HCO3 BLDA-SCNC: 19 MMOL/L — LOW (ref 21–28)
HCO3 BLDA-SCNC: 20 MMOL/L — LOW (ref 21–28)
HCO3 BLDA-SCNC: 20 MMOL/L — LOW (ref 21–28)
HCO3 BLDA-SCNC: 22 MMOL/L — SIGNIFICANT CHANGE UP (ref 21–28)
HCO3 BLDA-SCNC: 22 MMOL/L — SIGNIFICANT CHANGE UP (ref 21–28)
HCO3 BLDA-SCNC: 23 MMOL/L — SIGNIFICANT CHANGE UP (ref 21–28)
HCO3 BLDA-SCNC: 24 MMOL/L — SIGNIFICANT CHANGE UP (ref 21–28)
HCT VFR BLD CALC: 33.2 % — LOW (ref 34.5–45)
HGB BLD-MCNC: 11.3 G/DL — LOW (ref 11.5–15.5)
HGB BLDA-MCNC: 10.4 G/DL — LOW (ref 11.7–16.1)
HGB BLDA-MCNC: 10.8 G/DL — LOW (ref 11.7–16.1)
HGB BLDA-MCNC: 11.2 G/DL — LOW (ref 11.7–16.1)
HGB BLDA-MCNC: 11.3 G/DL — LOW (ref 11.7–16.1)
HGB BLDA-MCNC: 11.4 G/DL — LOW (ref 11.7–16.1)
HGB BLDA-MCNC: 11.8 G/DL — SIGNIFICANT CHANGE UP (ref 11.7–16.1)
HGB BLDA-MCNC: 12.1 G/DL — SIGNIFICANT CHANGE UP (ref 11.7–16.1)
HGB BLDA-MCNC: 12.6 G/DL — SIGNIFICANT CHANGE UP (ref 11.7–16.1)
IMM GRANULOCYTES NFR BLD AUTO: 0.5 % — SIGNIFICANT CHANGE UP (ref 0–0.9)
INR BLD: 1.19 — HIGH (ref 0.88–1.16)
LYMPHOCYTES # BLD AUTO: 14.2 % — SIGNIFICANT CHANGE UP (ref 13–44)
LYMPHOCYTES # BLD AUTO: 2.22 K/UL — SIGNIFICANT CHANGE UP (ref 1–3.3)
MAGNESIUM SERPL-MCNC: 1.9 MG/DL — SIGNIFICANT CHANGE UP (ref 1.6–2.6)
MCHC RBC-ENTMCNC: 29 PG — SIGNIFICANT CHANGE UP (ref 27–34)
MCHC RBC-ENTMCNC: 34 GM/DL — SIGNIFICANT CHANGE UP (ref 32–36)
MCV RBC AUTO: 85.1 FL — SIGNIFICANT CHANGE UP (ref 80–100)
METHGB MFR BLDA: 0 % — SIGNIFICANT CHANGE UP
METHGB MFR BLDA: 0 % — SIGNIFICANT CHANGE UP
METHGB MFR BLDA: 0.2 % — SIGNIFICANT CHANGE UP
METHGB MFR BLDA: 0.4 % — SIGNIFICANT CHANGE UP
METHGB MFR BLDA: 0.5 % — SIGNIFICANT CHANGE UP
MONOCYTES # BLD AUTO: 1.24 K/UL — HIGH (ref 0–0.9)
MONOCYTES NFR BLD AUTO: 7.9 % — SIGNIFICANT CHANGE UP (ref 2–14)
NEUTROPHILS # BLD AUTO: 12.03 K/UL — HIGH (ref 1.8–7.4)
NEUTROPHILS NFR BLD AUTO: 76.8 % — SIGNIFICANT CHANGE UP (ref 43–77)
NRBC # BLD: 0 /100 WBCS — SIGNIFICANT CHANGE UP (ref 0–0)
OXYHGB MFR BLDA: 97.9 % — HIGH (ref 90–95)
OXYHGB MFR BLDA: 98 % — HIGH (ref 90–95)
OXYHGB MFR BLDA: 98 % — HIGH (ref 90–95)
OXYHGB MFR BLDA: 98.2 % — HIGH (ref 90–95)
OXYHGB MFR BLDA: 98.2 % — HIGH (ref 90–95)
OXYHGB MFR BLDA: 98.5 % — HIGH (ref 90–95)
OXYHGB MFR BLDA: 98.6 % — HIGH (ref 90–95)
OXYHGB MFR BLDA: 98.8 % — HIGH (ref 90–95)
PCO2 BLDA: 27 MMHG — LOW (ref 32–45)
PCO2 BLDA: 31 MMHG — LOW (ref 32–45)
PCO2 BLDA: 32 MMHG — SIGNIFICANT CHANGE UP (ref 32–45)
PCO2 BLDA: 34 MMHG — SIGNIFICANT CHANGE UP (ref 32–45)
PCO2 BLDA: 34 MMHG — SIGNIFICANT CHANGE UP (ref 32–45)
PCO2 BLDA: 35 MMHG — SIGNIFICANT CHANGE UP (ref 32–45)
PCO2 BLDA: 36 MMHG — SIGNIFICANT CHANGE UP (ref 32–45)
PCO2 BLDA: 55 MMHG — HIGH (ref 32–45)
PH BLDA: 7.25 — LOW (ref 7.35–7.45)
PH BLDA: 7.35 — SIGNIFICANT CHANGE UP (ref 7.35–7.45)
PH BLDA: 7.37 — SIGNIFICANT CHANGE UP (ref 7.35–7.45)
PH BLDA: 7.4 — SIGNIFICANT CHANGE UP (ref 7.35–7.45)
PH BLDA: 7.41 — SIGNIFICANT CHANGE UP (ref 7.35–7.45)
PH BLDA: 7.48 — HIGH (ref 7.35–7.45)
PHOSPHATE SERPL-MCNC: 2.7 MG/DL — SIGNIFICANT CHANGE UP (ref 2.5–4.5)
PLATELET # BLD AUTO: 211 K/UL — SIGNIFICANT CHANGE UP (ref 150–400)
PO2 BLDA: 315 MMHG — HIGH (ref 83–108)
PO2 BLDA: 379 MMHG — HIGH (ref 83–108)
PO2 BLDA: 381 MMHG — HIGH (ref 83–108)
PO2 BLDA: 403 MMHG — HIGH (ref 83–108)
PO2 BLDA: 406 MMHG — HIGH (ref 83–108)
PO2 BLDA: 425 MMHG — HIGH (ref 83–108)
PO2 BLDA: 444 MMHG — HIGH (ref 83–108)
PO2 BLDA: 450 MMHG — HIGH (ref 83–108)
POTASSIUM BLDA-SCNC: 2.9 MMOL/L — CRITICAL LOW (ref 3.5–5.1)
POTASSIUM BLDA-SCNC: 3.4 MMOL/L — LOW (ref 3.5–5.1)
POTASSIUM BLDA-SCNC: 3.6 MMOL/L — SIGNIFICANT CHANGE UP (ref 3.5–5.1)
POTASSIUM BLDA-SCNC: 3.6 MMOL/L — SIGNIFICANT CHANGE UP (ref 3.5–5.1)
POTASSIUM BLDA-SCNC: 3.7 MMOL/L — SIGNIFICANT CHANGE UP (ref 3.5–5.1)
POTASSIUM BLDA-SCNC: 3.8 MMOL/L — SIGNIFICANT CHANGE UP (ref 3.5–5.1)
POTASSIUM BLDA-SCNC: 3.9 MMOL/L — SIGNIFICANT CHANGE UP (ref 3.5–5.1)
POTASSIUM BLDA-SCNC: 4 MMOL/L — SIGNIFICANT CHANGE UP (ref 3.5–5.1)
POTASSIUM SERPL-MCNC: 3.7 MMOL/L — SIGNIFICANT CHANGE UP (ref 3.5–5.3)
POTASSIUM SERPL-SCNC: 3.7 MMOL/L — SIGNIFICANT CHANGE UP (ref 3.5–5.3)
PROT SERPL-MCNC: 5.6 G/DL — LOW (ref 6–8.3)
PROTHROM AB SERPL-ACNC: 14.2 SEC — HIGH (ref 10.5–13.4)
RBC # BLD: 3.9 M/UL — SIGNIFICANT CHANGE UP (ref 3.8–5.2)
RBC # FLD: 13.2 % — SIGNIFICANT CHANGE UP (ref 10.3–14.5)
RH IG SCN BLD-IMP: POSITIVE — SIGNIFICANT CHANGE UP
SAO2 % BLDA: 98.5 % — HIGH (ref 94–98)
SAO2 % BLDA: 98.5 % — HIGH (ref 94–98)
SAO2 % BLDA: 98.6 % — HIGH (ref 94–98)
SAO2 % BLDA: 98.6 % — HIGH (ref 94–98)
SAO2 % BLDA: 98.8 % — HIGH (ref 94–98)
SAO2 % BLDA: 98.9 % — HIGH (ref 94–98)
SODIUM BLDA-SCNC: 135 MMOL/L — LOW (ref 136–145)
SODIUM BLDA-SCNC: 137 MMOL/L — SIGNIFICANT CHANGE UP (ref 136–145)
SODIUM BLDA-SCNC: 138 MMOL/L — SIGNIFICANT CHANGE UP (ref 136–145)
SODIUM BLDA-SCNC: 139 MMOL/L — SIGNIFICANT CHANGE UP (ref 136–145)
SODIUM SERPL-SCNC: 139 MMOL/L — SIGNIFICANT CHANGE UP (ref 135–145)
WBC # BLD: 15.65 K/UL — HIGH (ref 3.8–10.5)
WBC # FLD AUTO: 15.65 K/UL — HIGH (ref 3.8–10.5)

## 2022-12-15 PROCEDURE — 99291 CRITICAL CARE FIRST HOUR: CPT

## 2022-12-15 PROCEDURE — 33519 CABG ARTERY-VEIN THREE: CPT

## 2022-12-15 PROCEDURE — 71045 X-RAY EXAM CHEST 1 VIEW: CPT | Mod: 26

## 2022-12-15 PROCEDURE — 99292 CRITICAL CARE ADDL 30 MIN: CPT

## 2022-12-15 PROCEDURE — 93010 ELECTROCARDIOGRAM REPORT: CPT

## 2022-12-15 PROCEDURE — 33533 CABG ARTERIAL SINGLE: CPT

## 2022-12-15 DEVICE — KIT CVC 3LUM SPECTRUM 9FR: Type: IMPLANTABLE DEVICE | Status: FUNCTIONAL

## 2022-12-15 DEVICE — HEARTSTRING III PROXIMAL SEAL SYSTEM: Type: IMPLANTABLE DEVICE | Status: FUNCTIONAL

## 2022-12-15 DEVICE — CHEST DRAIN THORACIC PVC 28FR RIGHT ANGLE: Type: IMPLANTABLE DEVICE | Status: FUNCTIONAL

## 2022-12-15 DEVICE — SHUNT FLO-THRU INTRALUMINAL 1.25MM X 18MM: Type: IMPLANTABLE DEVICE | Status: FUNCTIONAL

## 2022-12-15 DEVICE — CANNULA VESSEL 3MM BEVELED TIP RADIOPAQUE: Type: IMPLANTABLE DEVICE | Status: FUNCTIONAL

## 2022-12-15 DEVICE — PACING WIRE STREAMLINE BIPOLAR MYOCARDIAL: Type: IMPLANTABLE DEVICE | Status: FUNCTIONAL

## 2022-12-15 DEVICE — SHUNT FLO-THRU INTRALUMINAL1.5MM X 18MM: Type: IMPLANTABLE DEVICE | Status: FUNCTIONAL

## 2022-12-15 DEVICE — LIGATING CLIPS WECK HORIZON SMALL-WIDE (RED) 24: Type: IMPLANTABLE DEVICE | Status: FUNCTIONAL

## 2022-12-15 DEVICE — SURGICEL 4 X 8": Type: IMPLANTABLE DEVICE | Status: FUNCTIONAL

## 2022-12-15 RX ORDER — SODIUM CHLORIDE 9 MG/ML
1000 INJECTION INTRAMUSCULAR; INTRAVENOUS; SUBCUTANEOUS
Refills: 0 | Status: DISCONTINUED | OUTPATIENT
Start: 2022-12-15 | End: 2022-12-19

## 2022-12-15 RX ORDER — INSULIN HUMAN 100 [IU]/ML
1 INJECTION, SOLUTION SUBCUTANEOUS
Qty: 50 | Refills: 0 | Status: DISCONTINUED | OUTPATIENT
Start: 2022-12-15 | End: 2022-12-17

## 2022-12-15 RX ORDER — FENTANYL CITRATE 50 UG/ML
25 INJECTION INTRAVENOUS
Refills: 0 | Status: DISCONTINUED | OUTPATIENT
Start: 2022-12-15 | End: 2022-12-16

## 2022-12-15 RX ORDER — HEPARIN SODIUM 5000 [USP'U]/ML
5000 INJECTION INTRAVENOUS; SUBCUTANEOUS EVERY 8 HOURS
Refills: 0 | Status: DISCONTINUED | OUTPATIENT
Start: 2022-12-15 | End: 2022-12-21

## 2022-12-15 RX ORDER — CEFAZOLIN SODIUM 1 G
2000 VIAL (EA) INJECTION EVERY 8 HOURS
Refills: 0 | Status: COMPLETED | OUTPATIENT
Start: 2022-12-15 | End: 2022-12-17

## 2022-12-15 RX ORDER — CHLORHEXIDINE GLUCONATE 213 G/1000ML
15 SOLUTION TOPICAL EVERY 12 HOURS
Refills: 0 | Status: DISCONTINUED | OUTPATIENT
Start: 2022-12-15 | End: 2022-12-16

## 2022-12-15 RX ORDER — DEXMEDETOMIDINE HYDROCHLORIDE IN 0.9% SODIUM CHLORIDE 4 UG/ML
0.2 INJECTION INTRAVENOUS
Qty: 400 | Refills: 0 | Status: DISCONTINUED | OUTPATIENT
Start: 2022-12-15 | End: 2022-12-16

## 2022-12-15 RX ORDER — CLOPIDOGREL BISULFATE 75 MG/1
75 TABLET, FILM COATED ORAL DAILY
Refills: 0 | Status: DISCONTINUED | OUTPATIENT
Start: 2022-12-15 | End: 2022-12-21

## 2022-12-15 RX ORDER — POTASSIUM CHLORIDE 20 MEQ
20 PACKET (EA) ORAL
Refills: 0 | Status: COMPLETED | OUTPATIENT
Start: 2022-12-15 | End: 2022-12-15

## 2022-12-15 RX ORDER — PANTOPRAZOLE SODIUM 20 MG/1
40 TABLET, DELAYED RELEASE ORAL DAILY
Refills: 0 | Status: DISCONTINUED | OUTPATIENT
Start: 2022-12-15 | End: 2022-12-21

## 2022-12-15 RX ORDER — DEXTROSE 50 % IN WATER 50 %
25 SYRINGE (ML) INTRAVENOUS
Refills: 0 | Status: DISCONTINUED | OUTPATIENT
Start: 2022-12-15 | End: 2022-12-17

## 2022-12-15 RX ORDER — METOPROLOL TARTRATE 50 MG
0 TABLET ORAL
Qty: 0 | Refills: 0 | DISCHARGE

## 2022-12-15 RX ORDER — CHLORHEXIDINE GLUCONATE 213 G/1000ML
1 SOLUTION TOPICAL
Refills: 0 | Status: DISCONTINUED | OUTPATIENT
Start: 2022-12-15 | End: 2022-12-19

## 2022-12-15 RX ORDER — SENNA PLUS 8.6 MG/1
2 TABLET ORAL AT BEDTIME
Refills: 0 | Status: DISCONTINUED | OUTPATIENT
Start: 2022-12-15 | End: 2022-12-21

## 2022-12-15 RX ORDER — ASPIRIN/CALCIUM CARB/MAGNESIUM 324 MG
81 TABLET ORAL DAILY
Refills: 0 | Status: DISCONTINUED | OUTPATIENT
Start: 2022-12-15 | End: 2022-12-21

## 2022-12-15 RX ORDER — EVOLOCUMAB 140 MG/ML
140 INJECTION, SOLUTION SUBCUTANEOUS
Qty: 0 | Refills: 0 | DISCHARGE

## 2022-12-15 RX ORDER — LEVOTHYROXINE SODIUM 125 MCG
100 TABLET ORAL DAILY
Refills: 0 | Status: DISCONTINUED | OUTPATIENT
Start: 2022-12-15 | End: 2022-12-21

## 2022-12-15 RX ORDER — ACETAMINOPHEN 500 MG
1000 TABLET ORAL ONCE
Refills: 0 | Status: COMPLETED | OUTPATIENT
Start: 2022-12-15 | End: 2022-12-15

## 2022-12-15 RX ORDER — DEXTROSE 50 % IN WATER 50 %
50 SYRINGE (ML) INTRAVENOUS
Refills: 0 | Status: DISCONTINUED | OUTPATIENT
Start: 2022-12-15 | End: 2022-12-17

## 2022-12-15 RX ORDER — CHLORHEXIDINE GLUCONATE 213 G/1000ML
5 SOLUTION TOPICAL
Refills: 0 | Status: DISCONTINUED | OUTPATIENT
Start: 2022-12-15 | End: 2022-12-16

## 2022-12-15 RX ORDER — ALBUMIN HUMAN 25 %
500 VIAL (ML) INTRAVENOUS ONCE
Refills: 0 | Status: COMPLETED | OUTPATIENT
Start: 2022-12-15 | End: 2022-12-15

## 2022-12-15 RX ADMIN — Medication 250 MILLILITER(S): at 22:00

## 2022-12-15 RX ADMIN — FENTANYL CITRATE 25 MICROGRAM(S): 50 INJECTION INTRAVENOUS at 20:30

## 2022-12-15 RX ADMIN — Medication 100 MILLIEQUIVALENT(S): at 21:33

## 2022-12-15 RX ADMIN — Medication 100 MILLIEQUIVALENT(S): at 22:51

## 2022-12-15 RX ADMIN — FENTANYL CITRATE 25 MICROGRAM(S): 50 INJECTION INTRAVENOUS at 23:55

## 2022-12-15 RX ADMIN — FENTANYL CITRATE 25 MICROGRAM(S): 50 INJECTION INTRAVENOUS at 20:15

## 2022-12-15 RX ADMIN — Medication 250 MILLILITER(S): at 23:00

## 2022-12-15 RX ADMIN — Medication 400 MILLIGRAM(S): at 21:30

## 2022-12-15 RX ADMIN — Medication 1000 MILLIGRAM(S): at 21:45

## 2022-12-15 NOTE — PROGRESS NOTE ADULT - SUBJECTIVE AND OBJECTIVE BOX
INTERVAL COURSE   S/p  OPCABx4 (LIMA-LAD, SVG-Diag, SVG-OM-Diag seq), EF normal   12/15  Received intubated, no infusions except low dose precedex  woke up non focal   Labs and CXR reviewed      VITALS  Vital Signs Last 24 Hrs  T(C): 36.4 (15 Dec 2022 14:12), Max: 36.4 (15 Dec 2022 14:12)  HR: 76 (15 Dec 2022 20:00) (76 - 77)  BP: 117/63 (15 Dec 2022 14:12)   BP(mean): 77  RR: 12 (15 Dec 2022 20:00) (12 - 16)  SpO2: 100% (15 Dec 2022 20:00) (95% - 100%)    Parameters below as of 15 Dec 2022 20:00  Patient On (Oxygen Delivery Method): ventilator    O2 Concentration (%): 100    I&O's Summary    15 Dec 2022 07:01  -  15 Dec 2022 20:38  --------------------------------------------------------  IN: 0 mL / OUT: 0 mL / NET: 0 mL        CAPILLARY BLOOD GLUCOSE      POCT Blood Glucose.: 183 mg/dL (15 Dec 2022 11:56)      PHYSICAL EXAM  General: intubated lightly sedated, NAD follows commands   Respiratory: CTA B/L; no wheezes  Cardiovascular: Regular rhythm/rate  Gastrointestinal: Soft; ND  Extremities: WWP; no edema   Neurological: no obvious focal deficits    MEDICATIONS  (STANDING):  acetaminophen   IVPB .. 1000 milliGRAM(s) IV Intermittent once  aspirin enteric coated 81 milliGRAM(s) Oral daily  ceFAZolin   IVPB 2000 milliGRAM(s) IV Intermittent every 8 hours  chlorhexidine 0.12% Liquid 15 milliLiter(s) Oral Mucosa every 12 hours  chlorhexidine 0.12% Liquid 5 milliLiter(s) Oral Mucosa two times a day  chlorhexidine 2% Cloths 1 Application(s) Topical <User Schedule>  clopidogrel Tablet 75 milliGRAM(s) Oral daily  dexMEDEtomidine Infusion 0.2 MICROgram(s)/kG/Hr (2.63 mL/Hr) IV Continuous <Continuous>  dextrose 50% Injectable 50 milliLiter(s) IV Push every 15 minutes  dextrose 50% Injectable 25 milliLiter(s) IV Push every 15 minutes  fentaNYL    Injectable 25 MICROGram(s) IV Push every 3 hours  heparin   Injectable 5000 Unit(s) SubCutaneous every 8 hours  insulin regular Infusion 1 Unit(s)/Hr (1 mL/Hr) IV Continuous <Continuous>  levothyroxine 100 MICROGram(s) Oral daily  pantoprazole    Tablet 40 milliGRAM(s) Oral daily  PARoxetine 10 milliGRAM(s) Oral daily  senna 2 Tablet(s) Oral at bedtime  sodium chloride 0.9%. 1000 milliLiter(s) (10 mL/Hr) IV Continuous <Continuous>        LABS  Pending                         11.3   15.65 )-----------( 211      ( 15 Dec 2022 20:14 )             33.2       IMAGING/EKG/ETC  Pending

## 2022-12-15 NOTE — PROGRESS NOTE ADULT - ASSESSMENT
77 F former smoker with PMHx of HLD on Repatha, CAD s/p PTCA 2012, DM type II and lung CA s/p partial lobectomy, presented for anginal symptoms cardiac cath 12/9 revealing 3vCAD.  S/p OPCABx4 (LIMA-LAD, SVG-Diag, SVG-OM-Diag seq), EF normal   12/15  uncomplicated intra-op course, no blood products given  Received intubated on no infusions     ASSESSMENT/PLAN  In Sinus, Normotensive  Will monitor organ perfusion parameters and arrhythmias   Cont DAPT/ On PCSK9 inhibitor at home, Not on statins ? intolerance will hold off on statins   H&H and Plt in good range--> low drains output   Normal kidney fx, autodiuresing, borderline UOP, volume replete supplement K and phos  Uncontrolled diabetes, A1C 9.2%--> starting insulin gtt   Resume levothyroxine   Ppx: Sq hep, PPI, HOB 45  SAT/SBT--> for extubation    ATTENDING: I have personally and independently provided 60 min of critical care services.  77 F former smoker with PMHx of HLD on Repatha, CAD s/p PTCA 2012, DM type II and lung CA s/p partial lobectomy, presented for anginal symptoms cardiac cath 12/9 revealing 3vCAD.  S/p OPCABx4 (LIMA-LAD, SVG-Diag, SVG-OM-Diag seq), EF normal   12/15  uncomplicated intra-op course, no blood products given  Received intubated on no infusions     ASSESSMENT/PLAN  In Sinus, Normotensive  Will monitor organ perfusion parameters and arrhythmias   Cont DAPT/ On PCSK9 inhibitor at home, Not on statins ? intolerance will hold off on statins   H&H and Plt in good range--> low drains output   Normal kidney fx, autodiuresing, borderline UOP, volume replete supplement K and phos  Uncontrolled diabetes, A1C 9.2%, just on metformin--> starting insulin gtt --Endo consult in am   Resume levothyroxine   Ppx: Sq hep, PPI, HOB 45  SAT/SBT--> for extubation    ATTENDING: I have personally and independently provided 60 min of critical care services.

## 2022-12-15 NOTE — PRE-ANESTHESIA EVALUATION ADULT - NSANTHOSAYNRD_GEN_A_CORE
No. TRAY screening performed.  STOP BANG Legend: 0-2 = LOW Risk; 3-4 = INTERMEDIATE Risk; 5-8 = HIGH Risk

## 2022-12-15 NOTE — DATA REVIEWED
[FreeTextEntry1] : 12/22/22 CCTA: Agatston CAC score 1383 (90th-100th percentile for age/gender), p/mLAD calcified/soft plaque w/ severe stenosis, D1 calcified and soft plaques may cause severe stenosis, LCx , OM1 calcified and soft plaques may cause severe stenosis, OM2 calcified and soft plaques may cause severe stenosis, p/m/dRCA multifocal calcified and soft plaques may cause severe stenosis, indeterminate anterior RUL 7mm nodule, medial LLL calcifications and pleural thickening\par \par 8/25/22 Echo: LVEF normal, moderate LVH, diastolic filling pattern indicated impaired relaxation, nonsignificant valvular disease.\par \par 12/9/22 Cardiac cath:  3vCAD; mRCA 99%, dRCA 100%, dLM 30%, pLCx 90%, OM1 mild diffuse, pLAD 30%, mLAD 80% (large), EDP 11, LVEF 50%

## 2022-12-15 NOTE — ASSESSMENT
[FreeTextEntry1] : 77 year old female  with a history of HTN, HLD, DM, GERD, lung cancer s/p left lung surgery and CAD presents with class III angina with 3 vessel CAD. . Dr. Garces reviewed the cardiac cath imaging and reports with the patient and  discussed the case with Dr. Hall and Dr. Maravilla.  Dr. Garces performed the STS risk calculator and quoted a 1-2% operative mortality and complication risk and discussed the STS risk factors with the patient including but not limited to death, heart attack, bleeding, stroke, kidney problems and infection. He also discussed the various approaches, minimally invasive versus sternotomy. Dr. Garces feels the patient will benefit and is a candidate for off pump CABG x 3. All questions were addressed and patient agrees to proceed with surgery.\par \par Plan: \par PST--labs, xray, u/a, EKG\par SDA 12/15\par patient instructed to take Metoprolol and levothyroxine on morning of surgery\par instructions provided re antibacterial showers and pt given 3 sponges\par consult endocrine postop

## 2022-12-15 NOTE — PHYSICAL EXAM
[General Appearance - Alert] : alert [General Appearance - In No Acute Distress] : in no acute distress [Sclera] : the sclera and conjunctiva were normal [PERRL With Normal Accommodation] : pupils were equal in size, round, and reactive to light [Extraocular Movements] : extraocular movements were intact [Outer Ear] : the ears and nose were normal in appearance [Oropharynx] : the oropharynx was normal [Neck Appearance] : the appearance of the neck was normal [Neck Cervical Mass (___cm)] : no neck mass was observed [Jugular Venous Distention Increased] : there was no jugular-venous distention [Thyroid Diffuse Enlargement] : the thyroid was not enlarged [Thyroid Nodule] : there were no palpable thyroid nodules [Auscultation Breath Sounds / Voice Sounds] : lungs were clear to auscultation bilaterally [Heart Rate And Rhythm] : heart rate was normal and rhythm regular [Heart Sounds] : normal S1 and S2 [Heart Sounds Gallop] : no gallops [Murmurs] : no murmurs [Heart Sounds Pericardial Friction Rub] : no pericardial rub [Examination Of The Chest] : the chest was normal in appearance [Chest Visual Inspection Thoracic Asymmetry] : no chest asymmetry [Diminished Respiratory Excursion] : normal chest expansion [2+] : left 2+ [No Abnormalities] : the abdominal aorta was not enlarged and no bruit was heard [Bowel Sounds] : normal bowel sounds [Abdomen Soft] : soft [Abdomen Tenderness] : non-tender [Abdomen Mass (___ Cm)] : no abdominal mass palpated [No CVA Tenderness] : no ~M costovertebral angle tenderness [No Spinal Tenderness] : no spinal tenderness [Abnormal Walk] : normal gait [Nail Clubbing] : no clubbing  or cyanosis of the fingernails [Musculoskeletal - Swelling] : no joint swelling seen [Motor Tone] : muscle strength and tone were normal [Skin Color & Pigmentation] : normal skin color and pigmentation [Skin Turgor] : normal skin turgor [] : no rash [Deep Tendon Reflexes (DTR)] : deep tendon reflexes were 2+ and symmetric [Sensation] : the sensory exam was normal to light touch and pinprick [No Focal Deficits] : no focal deficits [Oriented To Time, Place, And Person] : oriented to person, place, and time [Impaired Insight] : insight and judgment were intact [Affect] : the affect was normal [Right Carotid Bruit] : no bruit heard over the right carotid [Left Carotid Bruit] : no bruit heard over the left carotid [Right Femoral Bruit] : no bruit heard over the right femoral artery [Left Femoral Bruit] : no bruit heard over the left femoral artery

## 2022-12-15 NOTE — END OF VISIT
[Time Spent: ___ minutes] : I have spent [unfilled] minutes of time on the encounter. [FreeTextEntry3] : I, BUZZ GARCIA , am scribing for and in the presence of WILMA LOVING the following sections: History of present illness, past Medical/family/surgical/family/social history, review of systems, vital signs, physical exam and disposition.\par I personally performed the services described in the documentation, reviewed the documentation recorded by the scribe in my presence and it accurately and completely records my words and actions.\par

## 2022-12-15 NOTE — BRIEF OPERATIVE NOTE - NSICDXBRIEFPROCEDURE_GEN_ALL_CORE_FT
PROCEDURES:  CABG, with ERIC 15-Dec-2022 20:01:46 OPCABx4 (LIMA-LAD, SVG-Diag, SVG-OM-Diag seq), EF normal Daphnie Connor

## 2022-12-15 NOTE — REVIEW OF SYSTEMS
[Feeling Tired] : feeling tired [SOB on Exertion] : shortness of breath during exertion [Negative] : Heme/Lymph [Chest Pain] : no chest pain

## 2022-12-15 NOTE — HISTORY OF PRESENT ILLNESS
[FreeTextEntry1] : 78 yo female,  former heavy smoker presents with a PMH of HLD (on Repatha, no statin), HTN, GERD, CAD (s/p PTCA in 2012), DM-II, left Lung CA s/p partial lobectomy and Hypothyroidism  with class III angina. She  presented to outpatient cardiologist Dr. Maravilla with complaints of intermittent substernal sharp chest pain accompanied by WRIGHT with ambulating uphill for the past 6-9 months. 8/25/22 Echo revealed normal LVEF, no valvular disease. 12/2/22 CCTA revealed 3 vessel CAD w/calcium score 1383. 12/9/22 Cardiac cath revealed severe 3 vessel CAD. \par \par Patient presents today for surgical consult with Dr. Garces. She appears generally well, NAD.  She lives alone, is independent in ADLs and continues to work as acting . \par \par

## 2022-12-15 NOTE — BRIEF OPERATIVE NOTE - COMMENTS
Dr. Jluis Rdz first assisted for the entirety of the case, including but not limited to conduit harvest, distal/proximal anastamoses, and chest closure.

## 2022-12-16 LAB
ALBUMIN SERPL ELPH-MCNC: 4.4 G/DL — SIGNIFICANT CHANGE UP (ref 3.3–5)
ALBUMIN SERPL ELPH-MCNC: 4.5 G/DL — SIGNIFICANT CHANGE UP (ref 3.3–5)
ALBUMIN SERPL ELPH-MCNC: 4.8 G/DL — SIGNIFICANT CHANGE UP (ref 3.3–5)
ALBUMIN SERPL ELPH-MCNC: 4.8 G/DL — SIGNIFICANT CHANGE UP (ref 3.3–5)
ALP SERPL-CCNC: 74 U/L — SIGNIFICANT CHANGE UP (ref 40–120)
ALP SERPL-CCNC: 75 U/L — SIGNIFICANT CHANGE UP (ref 40–120)
ALP SERPL-CCNC: 79 U/L — SIGNIFICANT CHANGE UP (ref 40–120)
ALP SERPL-CCNC: 80 U/L — SIGNIFICANT CHANGE UP (ref 40–120)
ALT FLD-CCNC: 14 U/L — SIGNIFICANT CHANGE UP (ref 10–45)
ALT FLD-CCNC: 15 U/L — SIGNIFICANT CHANGE UP (ref 10–45)
ALT FLD-CCNC: 17 U/L — SIGNIFICANT CHANGE UP (ref 10–45)
ALT FLD-CCNC: 17 U/L — SIGNIFICANT CHANGE UP (ref 10–45)
ANION GAP SERPL CALC-SCNC: 10 MMOL/L — SIGNIFICANT CHANGE UP (ref 5–17)
ANION GAP SERPL CALC-SCNC: 11 MMOL/L — SIGNIFICANT CHANGE UP (ref 5–17)
ANION GAP SERPL CALC-SCNC: 14 MMOL/L — SIGNIFICANT CHANGE UP (ref 5–17)
ANION GAP SERPL CALC-SCNC: 16 MMOL/L — SIGNIFICANT CHANGE UP (ref 5–17)
ANION GAP SERPL CALC-SCNC: 18 MMOL/L — HIGH (ref 5–17)
APTT BLD: 25.3 SEC — LOW (ref 27.5–35.5)
APTT BLD: 26.1 SEC — LOW (ref 27.5–35.5)
APTT BLD: 26.8 SEC — LOW (ref 27.5–35.5)
APTT BLD: 29.2 SEC — SIGNIFICANT CHANGE UP (ref 27.5–35.5)
APTT BLD: 35.1 SEC — SIGNIFICANT CHANGE UP (ref 27.5–35.5)
AST SERPL-CCNC: 44 U/L — HIGH (ref 10–40)
AST SERPL-CCNC: 45 U/L — HIGH (ref 10–40)
AST SERPL-CCNC: 46 U/L — HIGH (ref 10–40)
AST SERPL-CCNC: 48 U/L — HIGH (ref 10–40)
B-OH-BUTYR SERPL-SCNC: 0.2 MMOL/L — SIGNIFICANT CHANGE UP
BASE EXCESS BLDV CALC-SCNC: -5.8 MMOL/L — LOW (ref -2–3)
BASOPHILS # BLD AUTO: 0.02 K/UL — SIGNIFICANT CHANGE UP (ref 0–0.2)
BASOPHILS NFR BLD AUTO: 0.2 % — SIGNIFICANT CHANGE UP (ref 0–2)
BILIRUB SERPL-MCNC: 1.4 MG/DL — HIGH (ref 0.2–1.2)
BILIRUB SERPL-MCNC: 1.4 MG/DL — HIGH (ref 0.2–1.2)
BILIRUB SERPL-MCNC: 1.5 MG/DL — HIGH (ref 0.2–1.2)
BILIRUB SERPL-MCNC: 1.6 MG/DL — HIGH (ref 0.2–1.2)
BUN SERPL-MCNC: 10 MG/DL — SIGNIFICANT CHANGE UP (ref 7–23)
BUN SERPL-MCNC: 10 MG/DL — SIGNIFICANT CHANGE UP (ref 7–23)
BUN SERPL-MCNC: 11 MG/DL — SIGNIFICANT CHANGE UP (ref 7–23)
BUN SERPL-MCNC: 9 MG/DL — SIGNIFICANT CHANGE UP (ref 7–23)
BUN SERPL-MCNC: 9 MG/DL — SIGNIFICANT CHANGE UP (ref 7–23)
CA-I SERPL-SCNC: 1.31 MMOL/L — SIGNIFICANT CHANGE UP (ref 1.15–1.33)
CALCIUM SERPL-MCNC: 8.5 MG/DL — SIGNIFICANT CHANGE UP (ref 8.4–10.5)
CALCIUM SERPL-MCNC: 8.7 MG/DL — SIGNIFICANT CHANGE UP (ref 8.4–10.5)
CALCIUM SERPL-MCNC: 8.8 MG/DL — SIGNIFICANT CHANGE UP (ref 8.4–10.5)
CALCIUM SERPL-MCNC: 9.6 MG/DL — SIGNIFICANT CHANGE UP (ref 8.4–10.5)
CALCIUM SERPL-MCNC: 9.9 MG/DL — SIGNIFICANT CHANGE UP (ref 8.4–10.5)
CHLORIDE SERPL-SCNC: 103 MMOL/L — SIGNIFICANT CHANGE UP (ref 96–108)
CHLORIDE SERPL-SCNC: 104 MMOL/L — SIGNIFICANT CHANGE UP (ref 96–108)
CHLORIDE SERPL-SCNC: 107 MMOL/L — SIGNIFICANT CHANGE UP (ref 96–108)
CHLORIDE SERPL-SCNC: 109 MMOL/L — HIGH (ref 96–108)
CHLORIDE SERPL-SCNC: 109 MMOL/L — HIGH (ref 96–108)
CO2 BLDV-SCNC: 20.6 MMOL/L — LOW (ref 22–26)
CO2 SERPL-SCNC: 18 MMOL/L — LOW (ref 22–31)
CO2 SERPL-SCNC: 19 MMOL/L — LOW (ref 22–31)
CO2 SERPL-SCNC: 21 MMOL/L — LOW (ref 22–31)
CO2 SERPL-SCNC: 21 MMOL/L — LOW (ref 22–31)
CO2 SERPL-SCNC: 23 MMOL/L — SIGNIFICANT CHANGE UP (ref 22–31)
CREAT SERPL-MCNC: 0.59 MG/DL — SIGNIFICANT CHANGE UP (ref 0.5–1.3)
CREAT SERPL-MCNC: 0.6 MG/DL — SIGNIFICANT CHANGE UP (ref 0.5–1.3)
CREAT SERPL-MCNC: 0.6 MG/DL — SIGNIFICANT CHANGE UP (ref 0.5–1.3)
CREAT SERPL-MCNC: 0.63 MG/DL — SIGNIFICANT CHANGE UP (ref 0.5–1.3)
CREAT SERPL-MCNC: 0.65 MG/DL — SIGNIFICANT CHANGE UP (ref 0.5–1.3)
EGFR: 90 ML/MIN/1.73M2 — SIGNIFICANT CHANGE UP
EGFR: 90 ML/MIN/1.73M2 — SIGNIFICANT CHANGE UP
EGFR: 91 ML/MIN/1.73M2 — SIGNIFICANT CHANGE UP
EGFR: 91 ML/MIN/1.73M2 — SIGNIFICANT CHANGE UP
EGFR: 92 ML/MIN/1.73M2 — SIGNIFICANT CHANGE UP
EOSINOPHIL # BLD AUTO: 0.26 K/UL — SIGNIFICANT CHANGE UP (ref 0–0.5)
EOSINOPHIL NFR BLD AUTO: 3.1 % — SIGNIFICANT CHANGE UP (ref 0–6)
GAS PNL BLDA: SIGNIFICANT CHANGE UP
GAS PNL BLDV: 138 MMOL/L — SIGNIFICANT CHANGE UP (ref 136–145)
GAS PNL BLDV: SIGNIFICANT CHANGE UP
GAS PNL BLDV: SIGNIFICANT CHANGE UP
GLUCOSE BLDC GLUCOMTR-MCNC: 105 MG/DL — HIGH (ref 70–99)
GLUCOSE BLDC GLUCOMTR-MCNC: 123 MG/DL — HIGH (ref 70–99)
GLUCOSE BLDC GLUCOMTR-MCNC: 128 MG/DL — HIGH (ref 70–99)
GLUCOSE BLDC GLUCOMTR-MCNC: 131 MG/DL — HIGH (ref 70–99)
GLUCOSE BLDC GLUCOMTR-MCNC: 133 MG/DL — HIGH (ref 70–99)
GLUCOSE BLDC GLUCOMTR-MCNC: 134 MG/DL — HIGH (ref 70–99)
GLUCOSE BLDC GLUCOMTR-MCNC: 138 MG/DL — HIGH (ref 70–99)
GLUCOSE BLDC GLUCOMTR-MCNC: 146 MG/DL — HIGH (ref 70–99)
GLUCOSE BLDC GLUCOMTR-MCNC: 147 MG/DL — HIGH (ref 70–99)
GLUCOSE BLDC GLUCOMTR-MCNC: 158 MG/DL — HIGH (ref 70–99)
GLUCOSE BLDC GLUCOMTR-MCNC: 159 MG/DL — HIGH (ref 70–99)
GLUCOSE BLDC GLUCOMTR-MCNC: 165 MG/DL — HIGH (ref 70–99)
GLUCOSE BLDC GLUCOMTR-MCNC: 167 MG/DL — HIGH (ref 70–99)
GLUCOSE BLDC GLUCOMTR-MCNC: 171 MG/DL — HIGH (ref 70–99)
GLUCOSE BLDC GLUCOMTR-MCNC: 82 MG/DL — SIGNIFICANT CHANGE UP (ref 70–99)
GLUCOSE BLDC GLUCOMTR-MCNC: 94 MG/DL — SIGNIFICANT CHANGE UP (ref 70–99)
GLUCOSE BLDC GLUCOMTR-MCNC: 98 MG/DL — SIGNIFICANT CHANGE UP (ref 70–99)
GLUCOSE SERPL-MCNC: 118 MG/DL — HIGH (ref 70–99)
GLUCOSE SERPL-MCNC: 142 MG/DL — HIGH (ref 70–99)
GLUCOSE SERPL-MCNC: 155 MG/DL — HIGH (ref 70–99)
GLUCOSE SERPL-MCNC: 176 MG/DL — HIGH (ref 70–99)
GLUCOSE SERPL-MCNC: 213 MG/DL — HIGH (ref 70–99)
HCO3 BLDV-SCNC: 20 MMOL/L — LOW (ref 22–29)
HCT VFR BLD CALC: 26.6 % — LOW (ref 34.5–45)
HCT VFR BLD CALC: 26.9 % — LOW (ref 34.5–45)
HCT VFR BLD CALC: 27.9 % — LOW (ref 34.5–45)
HCT VFR BLD CALC: 30.8 % — LOW (ref 34.5–45)
HCT VFR BLD CALC: 30.9 % — LOW (ref 34.5–45)
HGB BLD-MCNC: 10.2 G/DL — LOW (ref 11.5–15.5)
HGB BLD-MCNC: 10.2 G/DL — LOW (ref 11.5–15.5)
HGB BLD-MCNC: 8.9 G/DL — LOW (ref 11.5–15.5)
HGB BLD-MCNC: 9.1 G/DL — LOW (ref 11.5–15.5)
HGB BLD-MCNC: 9.2 G/DL — LOW (ref 11.5–15.5)
IMM GRANULOCYTES NFR BLD AUTO: 0.6 % — SIGNIFICANT CHANGE UP (ref 0–0.9)
INR BLD: 1.17 — HIGH (ref 0.88–1.16)
INR BLD: 1.18 — HIGH (ref 0.88–1.16)
INR BLD: 1.21 — HIGH (ref 0.88–1.16)
INR BLD: 1.22 — HIGH (ref 0.88–1.16)
INR BLD: 1.25 — HIGH (ref 0.88–1.16)
ISTAT ARTERIAL BE: 1 MMOL/L — SIGNIFICANT CHANGE UP (ref -2–3)
ISTAT ARTERIAL GLUCOSE: 232 MG/DL — HIGH (ref 70–99)
ISTAT ARTERIAL HCO3: 24 MMOL/L — SIGNIFICANT CHANGE UP (ref 22–26)
ISTAT ARTERIAL HEMATOCRIT: 29 % — LOW (ref 34.5–45)
ISTAT ARTERIAL HEMOGLOBIN: 9.9 G/DL — LOW (ref 11.5–15.5)
ISTAT ARTERIAL IONIZED CALCIUM: 1.08 MMOL/L — LOW (ref 1.12–1.3)
ISTAT ARTERIAL PCO2: 32 MMHG — LOW (ref 35–45)
ISTAT ARTERIAL PH: 7.48 — HIGH (ref 7.35–7.45)
ISTAT ARTERIAL PO2: 286 MMHG — HIGH (ref 80–105)
ISTAT ARTERIAL POTASSIUM: 3.5 MMOL/L — SIGNIFICANT CHANGE UP (ref 3.5–5.3)
ISTAT ARTERIAL SO2: 100 % — HIGH (ref 95–98)
ISTAT ARTERIAL SODIUM: 139 MMOL/L — SIGNIFICANT CHANGE UP (ref 135–145)
ISTAT ARTERIAL TCO2: 25 MMOL/L — SIGNIFICANT CHANGE UP (ref 22–31)
LACTATE SERPL-SCNC: 1.4 MMOL/L — SIGNIFICANT CHANGE UP (ref 0.5–2)
LACTATE SERPL-SCNC: 1.7 MMOL/L — SIGNIFICANT CHANGE UP (ref 0.5–2)
LACTATE SERPL-SCNC: 1.8 MMOL/L — SIGNIFICANT CHANGE UP (ref 0.5–2)
LACTATE SERPL-SCNC: 1.9 MMOL/L — SIGNIFICANT CHANGE UP (ref 0.5–2)
LACTATE SERPL-SCNC: 2.5 MMOL/L — HIGH (ref 0.5–2)
LYMPHOCYTES # BLD AUTO: 0.74 K/UL — LOW (ref 1–3.3)
LYMPHOCYTES # BLD AUTO: 8.9 % — LOW (ref 13–44)
MAGNESIUM SERPL-MCNC: 1.9 MG/DL — SIGNIFICANT CHANGE UP (ref 1.6–2.6)
MAGNESIUM SERPL-MCNC: 1.9 MG/DL — SIGNIFICANT CHANGE UP (ref 1.6–2.6)
MAGNESIUM SERPL-MCNC: 2.3 MG/DL — SIGNIFICANT CHANGE UP (ref 1.6–2.6)
MAGNESIUM SERPL-MCNC: 2.4 MG/DL — SIGNIFICANT CHANGE UP (ref 1.6–2.6)
MAGNESIUM SERPL-MCNC: 2.6 MG/DL — SIGNIFICANT CHANGE UP (ref 1.6–2.6)
MCHC RBC-ENTMCNC: 28.4 PG — SIGNIFICANT CHANGE UP (ref 27–34)
MCHC RBC-ENTMCNC: 28.6 PG — SIGNIFICANT CHANGE UP (ref 27–34)
MCHC RBC-ENTMCNC: 28.6 PG — SIGNIFICANT CHANGE UP (ref 27–34)
MCHC RBC-ENTMCNC: 28.8 PG — SIGNIFICANT CHANGE UP (ref 27–34)
MCHC RBC-ENTMCNC: 28.9 PG — SIGNIFICANT CHANGE UP (ref 27–34)
MCHC RBC-ENTMCNC: 33 GM/DL — SIGNIFICANT CHANGE UP (ref 32–36)
MCHC RBC-ENTMCNC: 33 GM/DL — SIGNIFICANT CHANGE UP (ref 32–36)
MCHC RBC-ENTMCNC: 33.1 GM/DL — SIGNIFICANT CHANGE UP (ref 32–36)
MCHC RBC-ENTMCNC: 33.5 GM/DL — SIGNIFICANT CHANGE UP (ref 32–36)
MCHC RBC-ENTMCNC: 33.8 GM/DL — SIGNIFICANT CHANGE UP (ref 32–36)
MCV RBC AUTO: 84.1 FL — SIGNIFICANT CHANGE UP (ref 80–100)
MCV RBC AUTO: 86.3 FL — SIGNIFICANT CHANGE UP (ref 80–100)
MCV RBC AUTO: 86.4 FL — SIGNIFICANT CHANGE UP (ref 80–100)
MCV RBC AUTO: 86.6 FL — SIGNIFICANT CHANGE UP (ref 80–100)
MCV RBC AUTO: 87.2 FL — SIGNIFICANT CHANGE UP (ref 80–100)
MONOCYTES # BLD AUTO: 0.64 K/UL — SIGNIFICANT CHANGE UP (ref 0–0.9)
MONOCYTES NFR BLD AUTO: 7.7 % — SIGNIFICANT CHANGE UP (ref 2–14)
NEUTROPHILS # BLD AUTO: 6.6 K/UL — SIGNIFICANT CHANGE UP (ref 1.8–7.4)
NEUTROPHILS NFR BLD AUTO: 79.5 % — HIGH (ref 43–77)
NRBC # BLD: 0 /100 WBCS — SIGNIFICANT CHANGE UP (ref 0–0)
PCO2 BLDV: 37 MMHG — LOW (ref 39–42)
PH BLDV: 7.33 — SIGNIFICANT CHANGE UP (ref 7.32–7.43)
PHOSPHATE SERPL-MCNC: 2.1 MG/DL — LOW (ref 2.5–4.5)
PHOSPHATE SERPL-MCNC: 2.5 MG/DL — SIGNIFICANT CHANGE UP (ref 2.5–4.5)
PHOSPHATE SERPL-MCNC: 2.7 MG/DL — SIGNIFICANT CHANGE UP (ref 2.5–4.5)
PHOSPHATE SERPL-MCNC: 2.9 MG/DL — SIGNIFICANT CHANGE UP (ref 2.5–4.5)
PLATELET # BLD AUTO: 157 K/UL — SIGNIFICANT CHANGE UP (ref 150–400)
PLATELET # BLD AUTO: 159 K/UL — SIGNIFICANT CHANGE UP (ref 150–400)
PLATELET # BLD AUTO: 162 K/UL — SIGNIFICANT CHANGE UP (ref 150–400)
PLATELET # BLD AUTO: 169 K/UL — SIGNIFICANT CHANGE UP (ref 150–400)
PLATELET # BLD AUTO: 201 K/UL — SIGNIFICANT CHANGE UP (ref 150–400)
PO2 BLDV: 33 MMHG — SIGNIFICANT CHANGE UP (ref 25–45)
POTASSIUM BLDV-SCNC: 4.3 MMOL/L — SIGNIFICANT CHANGE UP (ref 3.5–5.1)
POTASSIUM SERPL-MCNC: 3.3 MMOL/L — LOW (ref 3.5–5.3)
POTASSIUM SERPL-MCNC: 3.8 MMOL/L — SIGNIFICANT CHANGE UP (ref 3.5–5.3)
POTASSIUM SERPL-MCNC: 4 MMOL/L — SIGNIFICANT CHANGE UP (ref 3.5–5.3)
POTASSIUM SERPL-MCNC: 4.1 MMOL/L — SIGNIFICANT CHANGE UP (ref 3.5–5.3)
POTASSIUM SERPL-MCNC: 4.2 MMOL/L — SIGNIFICANT CHANGE UP (ref 3.5–5.3)
POTASSIUM SERPL-SCNC: 3.3 MMOL/L — LOW (ref 3.5–5.3)
POTASSIUM SERPL-SCNC: 3.8 MMOL/L — SIGNIFICANT CHANGE UP (ref 3.5–5.3)
POTASSIUM SERPL-SCNC: 4 MMOL/L — SIGNIFICANT CHANGE UP (ref 3.5–5.3)
POTASSIUM SERPL-SCNC: 4.1 MMOL/L — SIGNIFICANT CHANGE UP (ref 3.5–5.3)
POTASSIUM SERPL-SCNC: 4.2 MMOL/L — SIGNIFICANT CHANGE UP (ref 3.5–5.3)
PROT SERPL-MCNC: 5.7 G/DL — LOW (ref 6–8.3)
PROT SERPL-MCNC: 5.9 G/DL — LOW (ref 6–8.3)
PROT SERPL-MCNC: 6.3 G/DL — SIGNIFICANT CHANGE UP (ref 6–8.3)
PROT SERPL-MCNC: 6.3 G/DL — SIGNIFICANT CHANGE UP (ref 6–8.3)
PROTHROM AB SERPL-ACNC: 13.9 SEC — HIGH (ref 10.5–13.4)
PROTHROM AB SERPL-ACNC: 14.1 SEC — HIGH (ref 10.5–13.4)
PROTHROM AB SERPL-ACNC: 14.4 SEC — HIGH (ref 10.5–13.4)
PROTHROM AB SERPL-ACNC: 14.5 SEC — HIGH (ref 10.5–13.4)
PROTHROM AB SERPL-ACNC: 14.9 SEC — HIGH (ref 10.5–13.4)
RBC # BLD: 3.08 M/UL — LOW (ref 3.8–5.2)
RBC # BLD: 3.2 M/UL — LOW (ref 3.8–5.2)
RBC # BLD: 3.2 M/UL — LOW (ref 3.8–5.2)
RBC # BLD: 3.57 M/UL — LOW (ref 3.8–5.2)
RBC # BLD: 3.57 M/UL — LOW (ref 3.8–5.2)
RBC # FLD: 13.2 % — SIGNIFICANT CHANGE UP (ref 10.3–14.5)
RBC # FLD: 13.3 % — SIGNIFICANT CHANGE UP (ref 10.3–14.5)
RBC # FLD: 13.6 % — SIGNIFICANT CHANGE UP (ref 10.3–14.5)
RBC # FLD: 13.7 % — SIGNIFICANT CHANGE UP (ref 10.3–14.5)
RBC # FLD: 13.9 % — SIGNIFICANT CHANGE UP (ref 10.3–14.5)
SAO2 % BLDV: 50.1 % — LOW (ref 67–88)
SODIUM SERPL-SCNC: 139 MMOL/L — SIGNIFICANT CHANGE UP (ref 135–145)
SODIUM SERPL-SCNC: 139 MMOL/L — SIGNIFICANT CHANGE UP (ref 135–145)
SODIUM SERPL-SCNC: 141 MMOL/L — SIGNIFICANT CHANGE UP (ref 135–145)
SODIUM SERPL-SCNC: 142 MMOL/L — SIGNIFICANT CHANGE UP (ref 135–145)
SODIUM SERPL-SCNC: 142 MMOL/L — SIGNIFICANT CHANGE UP (ref 135–145)
WBC # BLD: 10.28 K/UL — SIGNIFICANT CHANGE UP (ref 3.8–10.5)
WBC # BLD: 10.79 K/UL — HIGH (ref 3.8–10.5)
WBC # BLD: 11.04 K/UL — HIGH (ref 3.8–10.5)
WBC # BLD: 11.14 K/UL — HIGH (ref 3.8–10.5)
WBC # BLD: 8.31 K/UL — SIGNIFICANT CHANGE UP (ref 3.8–10.5)
WBC # FLD AUTO: 10.28 K/UL — SIGNIFICANT CHANGE UP (ref 3.8–10.5)
WBC # FLD AUTO: 10.79 K/UL — HIGH (ref 3.8–10.5)
WBC # FLD AUTO: 11.04 K/UL — HIGH (ref 3.8–10.5)
WBC # FLD AUTO: 11.14 K/UL — HIGH (ref 3.8–10.5)
WBC # FLD AUTO: 8.31 K/UL — SIGNIFICANT CHANGE UP (ref 3.8–10.5)

## 2022-12-16 PROCEDURE — 99291 CRITICAL CARE FIRST HOUR: CPT

## 2022-12-16 PROCEDURE — 99292 CRITICAL CARE ADDL 30 MIN: CPT

## 2022-12-16 PROCEDURE — 71045 X-RAY EXAM CHEST 1 VIEW: CPT | Mod: 26

## 2022-12-16 PROCEDURE — 71045 X-RAY EXAM CHEST 1 VIEW: CPT | Mod: 26,77

## 2022-12-16 PROCEDURE — 93306 TTE W/DOPPLER COMPLETE: CPT | Mod: 26

## 2022-12-16 RX ORDER — ACETAMINOPHEN 500 MG
650 TABLET ORAL EVERY 6 HOURS
Refills: 0 | Status: DISCONTINUED | OUTPATIENT
Start: 2022-12-16 | End: 2022-12-21

## 2022-12-16 RX ORDER — ONDANSETRON 8 MG/1
4 TABLET, FILM COATED ORAL ONCE
Refills: 0 | Status: COMPLETED | OUTPATIENT
Start: 2022-12-16 | End: 2022-12-16

## 2022-12-16 RX ORDER — DOBUTAMINE HCL 250MG/20ML
2.5 VIAL (ML) INTRAVENOUS
Qty: 500 | Refills: 0 | Status: DISCONTINUED | OUTPATIENT
Start: 2022-12-16 | End: 2022-12-19

## 2022-12-16 RX ORDER — ACETAMINOPHEN 500 MG
1000 TABLET ORAL ONCE
Refills: 0 | Status: COMPLETED | OUTPATIENT
Start: 2022-12-16 | End: 2022-12-16

## 2022-12-16 RX ORDER — PHENYLEPHRINE HYDROCHLORIDE 10 MG/ML
0.3 INJECTION INTRAVENOUS
Qty: 40 | Refills: 0 | Status: DISCONTINUED | OUTPATIENT
Start: 2022-12-16 | End: 2022-12-16

## 2022-12-16 RX ORDER — FUROSEMIDE 40 MG
20 TABLET ORAL ONCE
Refills: 0 | Status: COMPLETED | OUTPATIENT
Start: 2022-12-16 | End: 2022-12-16

## 2022-12-16 RX ORDER — POTASSIUM CHLORIDE 20 MEQ
20 PACKET (EA) ORAL
Refills: 0 | Status: COMPLETED | OUTPATIENT
Start: 2022-12-16 | End: 2022-12-16

## 2022-12-16 RX ORDER — OXYCODONE HYDROCHLORIDE 5 MG/1
10 TABLET ORAL EVERY 6 HOURS
Refills: 0 | Status: DISCONTINUED | OUTPATIENT
Start: 2022-12-16 | End: 2022-12-21

## 2022-12-16 RX ORDER — POLYETHYLENE GLYCOL 3350 17 G/17G
17 POWDER, FOR SOLUTION ORAL DAILY
Refills: 0 | Status: DISCONTINUED | OUTPATIENT
Start: 2022-12-16 | End: 2022-12-21

## 2022-12-16 RX ORDER — MAGNESIUM SULFATE 500 MG/ML
2 VIAL (ML) INJECTION ONCE
Refills: 0 | Status: COMPLETED | OUTPATIENT
Start: 2022-12-16 | End: 2022-12-16

## 2022-12-16 RX ORDER — POTASSIUM CHLORIDE 20 MEQ
20 PACKET (EA) ORAL ONCE
Refills: 0 | Status: COMPLETED | OUTPATIENT
Start: 2022-12-16 | End: 2022-12-16

## 2022-12-16 RX ORDER — CALCIUM GLUCONATE 100 MG/ML
2 VIAL (ML) INTRAVENOUS ONCE
Refills: 0 | Status: COMPLETED | OUTPATIENT
Start: 2022-12-16 | End: 2022-12-16

## 2022-12-16 RX ORDER — ALBUMIN HUMAN 25 %
500 VIAL (ML) INTRAVENOUS ONCE
Refills: 0 | Status: COMPLETED | OUTPATIENT
Start: 2022-12-16 | End: 2022-12-15

## 2022-12-16 RX ORDER — ALBUMIN HUMAN 25 %
500 VIAL (ML) INTRAVENOUS ONCE
Refills: 0 | Status: DISCONTINUED | OUTPATIENT
Start: 2022-12-16 | End: 2022-12-16

## 2022-12-16 RX ORDER — ALBUMIN HUMAN 25 %
500 VIAL (ML) INTRAVENOUS ONCE
Refills: 0 | Status: COMPLETED | OUTPATIENT
Start: 2022-12-16 | End: 2022-12-16

## 2022-12-16 RX ORDER — NOREPINEPHRINE BITARTRATE/D5W 8 MG/250ML
0.05 PLASTIC BAG, INJECTION (ML) INTRAVENOUS
Qty: 8 | Refills: 0 | Status: DISCONTINUED | OUTPATIENT
Start: 2022-12-16 | End: 2022-12-16

## 2022-12-16 RX ORDER — OXYCODONE HYDROCHLORIDE 5 MG/1
5 TABLET ORAL EVERY 6 HOURS
Refills: 0 | Status: DISCONTINUED | OUTPATIENT
Start: 2022-12-16 | End: 2022-12-21

## 2022-12-16 RX ADMIN — Medication 100 MILLIGRAM(S): at 18:05

## 2022-12-16 RX ADMIN — Medication 20 MILLIGRAM(S): at 06:56

## 2022-12-16 RX ADMIN — Medication 200 GRAM(S): at 16:13

## 2022-12-16 RX ADMIN — Medication 3.95 MICROGRAM(S)/KG/MIN: at 08:23

## 2022-12-16 RX ADMIN — FENTANYL CITRATE 25 MICROGRAM(S): 50 INJECTION INTRAVENOUS at 01:06

## 2022-12-16 RX ADMIN — Medication 650 MILLIGRAM(S): at 23:30

## 2022-12-16 RX ADMIN — HEPARIN SODIUM 5000 UNIT(S): 5000 INJECTION INTRAVENOUS; SUBCUTANEOUS at 21:15

## 2022-12-16 RX ADMIN — HEPARIN SODIUM 5000 UNIT(S): 5000 INJECTION INTRAVENOUS; SUBCUTANEOUS at 14:15

## 2022-12-16 RX ADMIN — ONDANSETRON 4 MILLIGRAM(S): 8 TABLET, FILM COATED ORAL at 16:28

## 2022-12-16 RX ADMIN — FENTANYL CITRATE 25 MICROGRAM(S): 50 INJECTION INTRAVENOUS at 04:14

## 2022-12-16 RX ADMIN — Medication 50 MILLIEQUIVALENT(S): at 17:14

## 2022-12-16 RX ADMIN — HEPARIN SODIUM 5000 UNIT(S): 5000 INJECTION INTRAVENOUS; SUBCUTANEOUS at 06:50

## 2022-12-16 RX ADMIN — Medication 400 MILLIGRAM(S): at 12:52

## 2022-12-16 RX ADMIN — CHLORHEXIDINE GLUCONATE 1 APPLICATION(S): 213 SOLUTION TOPICAL at 06:50

## 2022-12-16 RX ADMIN — Medication 1000 MILLIGRAM(S): at 13:28

## 2022-12-16 RX ADMIN — PHENYLEPHRINE HYDROCHLORIDE 5.92 MICROGRAM(S)/KG/MIN: 10 INJECTION INTRAVENOUS at 12:18

## 2022-12-16 RX ADMIN — Medication 250 MILLILITER(S): at 12:12

## 2022-12-16 RX ADMIN — Medication 10 MILLIGRAM(S): at 16:19

## 2022-12-16 RX ADMIN — Medication 100 MILLIGRAM(S): at 01:11

## 2022-12-16 RX ADMIN — DEXMEDETOMIDINE HYDROCHLORIDE IN 0.9% SODIUM CHLORIDE 2.63 MICROGRAM(S)/KG/HR: 4 INJECTION INTRAVENOUS at 00:12

## 2022-12-16 RX ADMIN — Medication 81 MILLIGRAM(S): at 16:13

## 2022-12-16 RX ADMIN — INSULIN HUMAN 1 UNIT(S)/HR: 100 INJECTION, SOLUTION SUBCUTANEOUS at 00:28

## 2022-12-16 RX ADMIN — CLOPIDOGREL BISULFATE 75 MILLIGRAM(S): 75 TABLET, FILM COATED ORAL at 16:14

## 2022-12-16 RX ADMIN — Medication 50 MILLIEQUIVALENT(S): at 19:57

## 2022-12-16 RX ADMIN — Medication 25 GRAM(S): at 04:47

## 2022-12-16 RX ADMIN — Medication 20 MILLIGRAM(S): at 16:27

## 2022-12-16 RX ADMIN — OXYCODONE HYDROCHLORIDE 10 MILLIGRAM(S): 5 TABLET ORAL at 22:15

## 2022-12-16 RX ADMIN — Medication 50 MILLIEQUIVALENT(S): at 18:13

## 2022-12-16 RX ADMIN — OXYCODONE HYDROCHLORIDE 10 MILLIGRAM(S): 5 TABLET ORAL at 21:15

## 2022-12-16 RX ADMIN — PANTOPRAZOLE SODIUM 40 MILLIGRAM(S): 20 TABLET, DELAYED RELEASE ORAL at 16:14

## 2022-12-16 RX ADMIN — FENTANYL CITRATE 25 MICROGRAM(S): 50 INJECTION INTRAVENOUS at 04:25

## 2022-12-16 RX ADMIN — SENNA PLUS 2 TABLET(S): 8.6 TABLET ORAL at 21:15

## 2022-12-16 RX ADMIN — Medication 100 MILLIEQUIVALENT(S): at 16:13

## 2022-12-16 RX ADMIN — Medication 1000 MILLIGRAM(S): at 06:00

## 2022-12-16 RX ADMIN — Medication 400 MILLIGRAM(S): at 05:44

## 2022-12-16 RX ADMIN — Medication 650 MILLIGRAM(S): at 00:30

## 2022-12-16 RX ADMIN — Medication 100 MILLIGRAM(S): at 12:09

## 2022-12-16 NOTE — PROGRESS NOTE ADULT - SUBJECTIVE AND OBJECTIVE BOX
CTICU  CRITICAL  CARE  attending     Hand off received 					   Pertinent clinical, laboratory, radiographic, hemodynamic, echocardiographic, respiratory data, microbiologic data and chart were reviewed and analyzed frequently throughout the course of the day and night  Patient seen and examined with CTS/ SH attending at bedside    Pt is a 77y , Female, post op day # 1 s/p CABG x 4V; off pump;    post op:    Extubated today am  Hypotensive  Features of low cardiac output state  TTE: showing hyperdynamic LV and reduced RV fn with mild to moderate TR  Mild lactic acidosis  started on Inotrope support with dobutamine      Drips:    Dobutamine @ 3 mcgs  Neosynephrine @ 0.3 to 0.5 mcgs  regular insulin      , FAMILY HISTORY:  FH: myocardial infarction (Father)    PAST MEDICAL & SURGICAL HISTORY:  Hyperlipidemia      Diabetes mellitus      Lung cancer      Hypothyroidism      GERD (gastroesophageal reflux disease)      S/P partial lobectomy of lung  left 2012      S/P knee surgery  fractured right patella        Patient is a 77y old  Female who presents with a chief complaint of CAD (15 Dec 2022 20:36)      14 system review limited 2/2 post op morbidity    Vital signs, hemodynamic and respiratory parameters were reviewed from the bedside nursing flowsheet.  ICU Vital Signs Last 24 Hrs  T(C): 36.5 (16 Dec 2022 13:45), Max: 36.5 (16 Dec 2022 13:45)  T(F): 97.7 (16 Dec 2022 13:45), Max: 97.7 (16 Dec 2022 13:45)  HR: 84 (16 Dec 2022 14:00) (58 - 87)  BP: --  BP(mean): --  ABP: 137/50 (16 Dec 2022 14:00) (87/50 - 169/68)  ABP(mean): 78 (16 Dec 2022 14:00) (57 - 104)  RR: 20 (16 Dec 2022 14:00) (10 - 35)  SpO2: 95% (16 Dec 2022 14:00) (90% - 100%)    O2 Parameters below as of 16 Dec 2022 14:00  Patient On (Oxygen Delivery Method): nasal cannula w/ humidification  O2 Flow (L/min): 6        Adult Advanced Hemodynamics Last 24 Hrs  CVP(mm Hg): 1 (16 Dec 2022 14:00) (0 - 26)  CVP(cm H2O): --  CO: --  CI: --  PA: --  PA(mean): --  PCWP: --  SVR: --  SVRI: --  PVR: --  PVRI: --, ABG - ( 16 Dec 2022 14:56 )  pH, Arterial: 7.39  pH, Blood: x     /  pCO2: 33    /  pO2: 72    / HCO3: 20    / Base Excess: -4.1  /  SaO2: 96.3              Mode: standby    Intake and output was reviewed and the fluid balance was calculated  Daily     Daily   I&O's Summary    15 Dec 2022 07:01  -  16 Dec 2022 07:00  --------------------------------------------------------  IN: 2188.9 mL / OUT: 2210 mL / NET: -21.1 mL    16 Dec 2022 07:01  -  16 Dec 2022 15:25  --------------------------------------------------------  IN: 1787.5 mL / OUT: 1020 mL / NET: 767.5 mL        All lines and drain sites were assessed  Glycemic trend [facility]      POCT Blood Glucose.: 128 mg/dL (16 Dec 2022 14:51)    No acute change in mental status  Auscultation of the chest reveals equal bs  Abdomen is soft  Extremities are warm and well perfused  Wounds appear clean and unremarkable  Antibiotics are periop    labs  CBC Full  -  ( 16 Dec 2022 14:57 )  WBC Count : 10.79 K/uL  RBC Count : 3.57 M/uL  Hemoglobin : 10.2 g/dL  Hematocrit : 30.8 %  Platelet Count - Automated : 162 K/uL  Mean Cell Volume : 86.3 fl  Mean Cell Hemoglobin : 28.6 pg  Mean Cell Hemoglobin Concentration : 33.1 gm/dL  Auto Neutrophil # : x  Auto Lymphocyte # : x  Auto Monocyte # : x  Auto Eosinophil # : x  Auto Basophil # : x  Auto Neutrophil % : x  Auto Lymphocyte % : x  Auto Monocyte % : x  Auto Eosinophil % : x  Auto Basophil % : x    12-16    139  |  104  |  10  ----------------------------<  176<H>  3.8   |  19<L>  |  0.60    Ca    8.5      16 Dec 2022 10:25  Phos  2.5     12-16  Mg     2.4     12-16    TPro  6.3  /  Alb  4.8  /  TBili  1.4<H>  /  DBili  x   /  AST  46<H>  /  ALT  15  /  AlkPhos  75  12-16    PT/INR - ( 16 Dec 2022 14:57 )   PT: 14.5 sec;   INR: 1.22          PTT - ( 16 Dec 2022 14:57 )  PTT:26.1 sec  The current medications were reviewed   MEDICATIONS  (STANDING):  acetaminophen   IVPB .. 1000 milliGRAM(s) IV Intermittent once  aspirin enteric coated 81 milliGRAM(s) Oral daily  calcium gluconate IVPB 2 Gram(s) IV Intermittent once  ceFAZolin   IVPB 2000 milliGRAM(s) IV Intermittent every 8 hours  chlorhexidine 0.12% Liquid 5 milliLiter(s) Oral Mucosa two times a day  chlorhexidine 2% Cloths 1 Application(s) Topical <User Schedule>  clopidogrel Tablet 75 milliGRAM(s) Oral daily  dextrose 50% Injectable 50 milliLiter(s) IV Push every 15 minutes  dextrose 50% Injectable 25 milliLiter(s) IV Push every 15 minutes  DOBUTamine Infusion 2.5 MICROgram(s)/kG/Min (3.95 mL/Hr) IV Continuous <Continuous>  heparin   Injectable 5000 Unit(s) SubCutaneous every 8 hours  insulin regular Infusion 1 Unit(s)/Hr (1 mL/Hr) IV Continuous <Continuous>  levothyroxine 100 MICROGram(s) Oral daily  norepinephrine Infusion 0.05 MICROgram(s)/kG/Min (4.93 mL/Hr) IV Continuous <Continuous>  ondansetron Injectable 4 milliGRAM(s) IV Push once  pantoprazole    Tablet 40 milliGRAM(s) Oral daily  PARoxetine 10 milliGRAM(s) Oral daily  phenylephrine    Infusion 0.3 MICROgram(s)/kG/Min (5.92 mL/Hr) IV Continuous <Continuous>  polyethylene glycol 3350 17 Gram(s) Oral daily  potassium chloride  20 mEq/100 mL IVPB 20 milliEquivalent(s) IV Intermittent once  senna 2 Tablet(s) Oral at bedtime  sodium chloride 0.9%. 1000 milliLiter(s) (10 mL/Hr) IV Continuous <Continuous>    MEDICATIONS  (PRN):  acetaminophen     Tablet .. 650 milliGRAM(s) Oral every 6 hours PRN Mild Pain (1 - 3)  oxyCODONE    IR 5 milliGRAM(s) Oral every 6 hours PRN Moderate Pain (4 - 6)  oxyCODONE    IR 10 milliGRAM(s) Oral every 6 hours PRN Severe Pain (7 - 10)       PROBLEM LIST/ ASSESSMENT:  HEALTH ISSUES - PROBLEM Dx:      ,   Patient is a 77y old  Female who presents with a chief complaint of CAD (15 Dec 2022 20:36)     s/p OPCAB x 4V;        My plan includes :    Titrate pressor support to maintain MAP >70  Titrate Inotrope support to maintain normal lactate and other metrics of end organ perfusion  Trend Lactate levels  Titrate supplemental O2 to maintain Sao2 >95%  Serial ABGs  Diurese for negative fluid balance    close hemodynamic, ventilatory and drain monitoring and management per post op routine    Monitor for arrhythmias and monitor parameters for organ perfusion  monitor neurologic status  Head of the bed should remain elevated to 45 deg .   chest PT and IS will be encouraged  monitor adequacy of oxygenation and ventilation and attempt to wean oxygen  Nutritional goals will be met using po eventually , ensure adequate caloric intake and montior the same  Stress ulcer and VTE prophylaxis will be achieved    Glycemic control is satisfactory  Electrolytes have been repleted as necessary and wound care has been carried out. Pain control has been achieved.   agressive physical therapy and early mobility and ambulation goals will be met   The family was updated about the course and plan  CRITICAL CARE TIME SPENT in evaluation and management, reassessments, review and interpretation of labs and x-rays, ventilator and hemodynamic management, formulating a plan and coordinating care: ___90____ MIN.  Time does not include procedural time.  CTICU ATTENDING     					    Duke Best MD                        	 CTICU  CRITICAL  CARE  attending     Hand off received 					   Pertinent clinical, laboratory, radiographic, hemodynamic, echocardiographic, respiratory data, microbiologic data and chart were reviewed and analyzed frequently throughout the course of the day and night  Patient seen and examined with CTS/ SH attending at bedside    Pt is a 77y , Female, post op day # 1 s/p CABG x 4V; off pump;    post op:    Extubated today am  Hypotensive  Features of low cardiac output state  TTE: showing hyperdynamic LV and reduced RV fn with mild to moderate TR  Mild lactic acidosis  started on Inotrope support with dobutamine  acute post H'gic anemia  s/p 1 unit PRBC      Drips:    Dobutamine @ 3 mcgs  Neosynephrine @ 0.3 to 0.5 mcgs  regular insulin      , FAMILY HISTORY:  FH: myocardial infarction (Father)    PAST MEDICAL & SURGICAL HISTORY:  Hyperlipidemia      Diabetes mellitus      Lung cancer      Hypothyroidism      GERD (gastroesophageal reflux disease)      S/P partial lobectomy of lung  left 2012      S/P knee surgery  fractured right patella        Patient is a 77y old  Female who presents with a chief complaint of CAD (15 Dec 2022 20:36)      14 system review limited 2/2 post op morbidity    Vital signs, hemodynamic and respiratory parameters were reviewed from the bedside nursing flowsheet.  ICU Vital Signs Last 24 Hrs  T(C): 36.5 (16 Dec 2022 13:45), Max: 36.5 (16 Dec 2022 13:45)  T(F): 97.7 (16 Dec 2022 13:45), Max: 97.7 (16 Dec 2022 13:45)  HR: 84 (16 Dec 2022 14:00) (58 - 87)  BP: --  BP(mean): --  ABP: 137/50 (16 Dec 2022 14:00) (87/50 - 169/68)  ABP(mean): 78 (16 Dec 2022 14:00) (57 - 104)  RR: 20 (16 Dec 2022 14:00) (10 - 35)  SpO2: 95% (16 Dec 2022 14:00) (90% - 100%)    O2 Parameters below as of 16 Dec 2022 14:00  Patient On (Oxygen Delivery Method): nasal cannula w/ humidification  O2 Flow (L/min): 6        Adult Advanced Hemodynamics Last 24 Hrs  CVP(mm Hg): 1 (16 Dec 2022 14:00) (0 - 26)  CVP(cm H2O): --  CO: --  CI: --  PA: --  PA(mean): --  PCWP: --  SVR: --  SVRI: --  PVR: --  PVRI: --, ABG - ( 16 Dec 2022 14:56 )  pH, Arterial: 7.39  pH, Blood: x     /  pCO2: 33    /  pO2: 72    / HCO3: 20    / Base Excess: -4.1  /  SaO2: 96.3              Mode: standby    Intake and output was reviewed and the fluid balance was calculated  Daily     Daily   I&O's Summary    15 Dec 2022 07:01  -  16 Dec 2022 07:00  --------------------------------------------------------  IN: 2188.9 mL / OUT: 2210 mL / NET: -21.1 mL    16 Dec 2022 07:01  -  16 Dec 2022 15:25  --------------------------------------------------------  IN: 1787.5 mL / OUT: 1020 mL / NET: 767.5 mL        All lines and drain sites were assessed  Glycemic trend was reviewedCAPILLARY BLOOD GLUCOSE      POCT Blood Glucose.: 128 mg/dL (16 Dec 2022 14:51)    No acute change in mental status  Auscultation of the chest reveals equal bs  Abdomen is soft  Extremities are warm and well perfused  Wounds appear clean and unremarkable  Antibiotics are periop    labs  CBC Full  -  ( 16 Dec 2022 14:57 )  WBC Count : 10.79 K/uL  RBC Count : 3.57 M/uL  Hemoglobin : 10.2 g/dL  Hematocrit : 30.8 %  Platelet Count - Automated : 162 K/uL  Mean Cell Volume : 86.3 fl  Mean Cell Hemoglobin : 28.6 pg  Mean Cell Hemoglobin Concentration : 33.1 gm/dL  Auto Neutrophil # : x  Auto Lymphocyte # : x  Auto Monocyte # : x  Auto Eosinophil # : x  Auto Basophil # : x  Auto Neutrophil % : x  Auto Lymphocyte % : x  Auto Monocyte % : x  Auto Eosinophil % : x  Auto Basophil % : x    12-16    139  |  104  |  10  ----------------------------<  176<H>  3.8   |  19<L>  |  0.60    Ca    8.5      16 Dec 2022 10:25  Phos  2.5     12-16  Mg     2.4     12-16    TPro  6.3  /  Alb  4.8  /  TBili  1.4<H>  /  DBili  x   /  AST  46<H>  /  ALT  15  /  AlkPhos  75  12-16    PT/INR - ( 16 Dec 2022 14:57 )   PT: 14.5 sec;   INR: 1.22          PTT - ( 16 Dec 2022 14:57 )  PTT:26.1 sec  The current medications were reviewed   MEDICATIONS  (STANDING):  acetaminophen   IVPB .. 1000 milliGRAM(s) IV Intermittent once  aspirin enteric coated 81 milliGRAM(s) Oral daily  calcium gluconate IVPB 2 Gram(s) IV Intermittent once  ceFAZolin   IVPB 2000 milliGRAM(s) IV Intermittent every 8 hours  chlorhexidine 0.12% Liquid 5 milliLiter(s) Oral Mucosa two times a day  chlorhexidine 2% Cloths 1 Application(s) Topical <User Schedule>  clopidogrel Tablet 75 milliGRAM(s) Oral daily  dextrose 50% Injectable 50 milliLiter(s) IV Push every 15 minutes  dextrose 50% Injectable 25 milliLiter(s) IV Push every 15 minutes  DOBUTamine Infusion 2.5 MICROgram(s)/kG/Min (3.95 mL/Hr) IV Continuous <Continuous>  heparin   Injectable 5000 Unit(s) SubCutaneous every 8 hours  insulin regular Infusion 1 Unit(s)/Hr (1 mL/Hr) IV Continuous <Continuous>  levothyroxine 100 MICROGram(s) Oral daily  norepinephrine Infusion 0.05 MICROgram(s)/kG/Min (4.93 mL/Hr) IV Continuous <Continuous>  ondansetron Injectable 4 milliGRAM(s) IV Push once  pantoprazole    Tablet 40 milliGRAM(s) Oral daily  PARoxetine 10 milliGRAM(s) Oral daily  phenylephrine    Infusion 0.3 MICROgram(s)/kG/Min (5.92 mL/Hr) IV Continuous <Continuous>  polyethylene glycol 3350 17 Gram(s) Oral daily  potassium chloride  20 mEq/100 mL IVPB 20 milliEquivalent(s) IV Intermittent once  senna 2 Tablet(s) Oral at bedtime  sodium chloride 0.9%. 1000 milliLiter(s) (10 mL/Hr) IV Continuous <Continuous>    MEDICATIONS  (PRN):  acetaminophen     Tablet .. 650 milliGRAM(s) Oral every 6 hours PRN Mild Pain (1 - 3)  oxyCODONE    IR 5 milliGRAM(s) Oral every 6 hours PRN Moderate Pain (4 - 6)  oxyCODONE    IR 10 milliGRAM(s) Oral every 6 hours PRN Severe Pain (7 - 10)       PROBLEM LIST/ ASSESSMENT:  HEALTH ISSUES - PROBLEM Dx:      ,   Patient is a 77y old  Female who presents with a chief complaint of CAD (15 Dec 2022 20:36)     s/p OPCAB x 4V;        My plan includes :    Titrate pressor support to maintain MAP >70  Titrate Inotrope support to maintain normal lactate and other metrics of end organ perfusion  Trend Lactate levels  Titrate supplemental O2 to maintain Sao2 >95%  Serial ABGs  Diurese for negative fluid balance    close hemodynamic, ventilatory and drain monitoring and management per post op routine    Monitor for arrhythmias and monitor parameters for organ perfusion  monitor neurologic status  Head of the bed should remain elevated to 45 deg .   chest PT and IS will be encouraged  monitor adequacy of oxygenation and ventilation and attempt to wean oxygen  Nutritional goals will be met using po eventually , ensure adequate caloric intake and montior the same  Stress ulcer and VTE prophylaxis will be achieved    Glycemic control is satisfactory  Electrolytes have been repleted as necessary and wound care has been carried out. Pain control has been achieved.   agressive physical therapy and early mobility and ambulation goals will be met   The family was updated about the course and plan  CRITICAL CARE TIME SPENT in evaluation and management, reassessments, review and interpretation of labs and x-rays, ventilator and hemodynamic management, formulating a plan and coordinating care: ___90____ MIN.  Time does not include procedural time.  CTICU ATTENDING     					    Duke Best MD

## 2022-12-16 NOTE — PROGRESS NOTE ADULT - SUBJECTIVE AND OBJECTIVE BOX
CTICU  CRITICAL  CARE  attending     Hand off received 					   Pertinent clinical, laboratory, radiographic, hemodynamic, echocardiographic, respiratory data, microbiologic data and chart were reviewed and analyzed frequently throughout the course of the day  Patient seen and examined with CTS/ SH attending at bedside  Pt is a 77yr old female with PMH HLD, CAD, DM, left lung ca sp partial lobectomy, hypothyroidism, admitted for surgical mgmt of 3V CAD. Underwent OpCABG x 4 (LIMA-LAD, SVG-Diag, SVG-OM-Diag sequential, nl EF) with Dr. Garces 12/15. Extubated early AM today. Started on dobutamine for RV dysfunction on TTE. Given 1 unit pRBC.     FAMILY HISTORY:  FH: myocardial infarction (Father)  PAST MEDICAL & SURGICAL HISTORY:  Hyperlipidemia  Diabetes mellitus  Lung cancer  Hypothyroidism  GERD (gastroesophageal reflux disease)  S/P partial lobectomy of lung  left 2012  S/P knee surgery  fractured right patella        Patient is a 77y old  Female who presents with a chief complaint of CAD.      14 system review was unremarkable    Vital signs, hemodynamic and respiratory parameters were reviewed from the bedside nursing flowsheet.  ICU Vital Signs Last 24 Hrs  T(C): 36.8 (16 Dec 2022 17:57), Max: 36.8 (16 Dec 2022 17:57)  T(F): 98.3 (16 Dec 2022 17:57), Max: 98.3 (16 Dec 2022 17:57)  HR: 88 (16 Dec 2022 19:00) (58 - 96)  BP: --  BP(mean): --  ABP: 161/66 (16 Dec 2022 19:00) (87/50 - 169/68)  ABP(mean): 95 (16 Dec 2022 19:00) (57 - 104)  RR: 18 (16 Dec 2022 18:00) (10 - 35)  SpO2: 96% (16 Dec 2022 19:00) (90% - 100%)    O2 Parameters below as of 16 Dec 2022 20:00  Patient On (Oxygen Delivery Method): nasal cannula w/ humidification  O2 Flow (L/min): 4        Adult Advanced Hemodynamics Last 24 Hrs  CVP(mm Hg): 19 (16 Dec 2022 19:00) (-2 - 26)  CVP(cm H2O): --  CO: --  CI: --  PA: --  PA(mean): --  PCWP: --  SVR: --  SVRI: --  PVR: --  PVRI: --, ABG - ( 16 Dec 2022 18:22 )  pH, Arterial: 7.40  pH, Blood: x     /  pCO2: 35    /  pO2: 72    / HCO3: 22    / Base Excess: -2.5  /  SaO2: 95.4              Mode: standby    Intake and output was reviewed and the fluid balance was calculated  Daily     Daily   I&O's Summary    15 Dec 2022 07:01  -  16 Dec 2022 07:00  --------------------------------------------------------  IN: 2188.9 mL / OUT: 2210 mL / NET: -21.1 mL    16 Dec 2022 07:01  -  16 Dec 2022 20:26  --------------------------------------------------------  IN: 2524.5 mL / OUT: 1960 mL / NET: 564.5 mL        All lines and drain sites were assessed  Glycemic trend was reviewed    POCT Blood Glucose.: 146 mg/dL (16 Dec 2022 19:09)      Neuro: pleasant  HEENT: mmm  Heart: s1 s2  Lungs: clear bl   Abdomen: soft nt nd  Extremities: 2+dp    Lines:  RIj TLC 12/15  L radial arterial line 12/15    Tubes:  2 bulbs      labs  CBC Full  -  ( 16 Dec 2022 14:57 )  WBC Count : 10.79 K/uL  RBC Count : 3.57 M/uL  Hemoglobin : 10.2 g/dL  Hematocrit : 30.8 %  Platelet Count - Automated : 162 K/uL  Mean Cell Volume : 86.3 fl  Mean Cell Hemoglobin : 28.6 pg  Mean Cell Hemoglobin Concentration : 33.1 gm/dL  Auto Neutrophil # : x  Auto Lymphocyte # : x  Auto Monocyte # : x  Auto Eosinophil # : x  Auto Basophil # : x  Auto Neutrophil % : x  Auto Lymphocyte % : x  Auto Monocyte % : x  Auto Eosinophil % : x  Auto Basophil % : x    12-16    142  |  107  |  10  ----------------------------<  142<H>  3.3<L>   |  21<L>  |  0.63    Ca    9.9      16 Dec 2022 14:57  Phos  2.7     12-16  Mg     2.3     12-16    TPro  6.3  /  Alb  4.8  /  TBili  1.6<H>  /  DBili  x   /  AST  44<H>  /  ALT  14  /  AlkPhos  74  12-16    PT/INR - ( 16 Dec 2022 14:57 )   PT: 14.5 sec;   INR: 1.22          PTT - ( 16 Dec 2022 14:57 )  PTT:26.1 sec  The current medications were reviewed   MEDICATIONS  (STANDING):  aspirin enteric coated 81 milliGRAM(s) Oral daily  ceFAZolin   IVPB 2000 milliGRAM(s) IV Intermittent every 8 hours  chlorhexidine 2% Cloths 1 Application(s) Topical <User Schedule>  clopidogrel Tablet 75 milliGRAM(s) Oral daily  dextrose 50% Injectable 50 milliLiter(s) IV Push every 15 minutes  dextrose 50% Injectable 25 milliLiter(s) IV Push every 15 minutes  DOBUTamine Infusion 2.5 MICROgram(s)/kG/Min (3.95 mL/Hr) IV Continuous <Continuous>  heparin   Injectable 5000 Unit(s) SubCutaneous every 8 hours  insulin regular Infusion 1 Unit(s)/Hr (1 mL/Hr) IV Continuous <Continuous>  levothyroxine 100 MICROGram(s) Oral daily  pantoprazole    Tablet 40 milliGRAM(s) Oral daily  PARoxetine 10 milliGRAM(s) Oral daily  polyethylene glycol 3350 17 Gram(s) Oral daily  senna 2 Tablet(s) Oral at bedtime  sodium chloride 0.9%. 1000 milliLiter(s) (10 mL/Hr) IV Continuous <Continuous>    MEDICATIONS  (PRN):  acetaminophen     Tablet .. 650 milliGRAM(s) Oral every 6 hours PRN Mild Pain (1 - 3)  oxyCODONE    IR 5 milliGRAM(s) Oral every 6 hours PRN Moderate Pain (4 - 6)  oxyCODONE    IR 10 milliGRAM(s) Oral every 6 hours PRN Severe Pain (7 - 10)      Assessment/Plan:  77yr old female with PMH HLD, CAD, DM, left lung ca sp partial lobectomy, hypothyroidism, admitted for surgical mgmt of 3V CAD.     POD 1 OpCABG x 4 (LIMA-LAD, SVG-Diag, SVG-OM-Diag sequential, nl EF, Garces, 12/15)  Acute post operative anemia sp 1 unit pRBC today-trend H/H  Cardiogenic shock on dobutamine-serial labs, wean  Periop Abx  Aspirin  Plavix  Diet as tolerated  Replete lytes prn  Monitor CT output  GI/DVT PPX  Bowel Regimen  Pain control  Close hemodynamic, ventilatory and drain monitoring and management per post op routine  Monitor for arrhythmias and monitor parameters for organ perfusion  Beta blockade not administered due to hemodynamic instability and bradycardia  Monitor neurologic status  Head of the bed should remain elevated to 45 deg   Chest PT and IS will be encouraged  Monitor adequacy of oxygenation and ventilation and attempt to wean oxygen  Antibiotic regimen will be tailored to the clinical, laboratory and microbiologic data  Nutritional goals will be met using po eventually, ensure adequate caloric intake and monitor the same  Stress ulcer and VTE prophylaxis will be achieved    Glycemic control is satisfactory  Electrolytes have been repleted as necessary and wound care has been carried out   Pain control has been achieved.   Aggressive physical therapy and early mobility and ambulation goals will be met   The family was updated about the course and plan.    CRITICAL CARE TIME personally provided by me  in evaluation and management, reassessments, review and interpretation of labs and x-rays, ventilator and hemodynamic management, formulating a plan and coordinating care: ___30____ MIN.  Time does not include procedural time.    CTICU ATTENDING     					  Beverly Burciaga MD

## 2022-12-17 LAB
ALBUMIN SERPL ELPH-MCNC: 3.9 G/DL — SIGNIFICANT CHANGE UP (ref 3.3–5)
ALBUMIN SERPL ELPH-MCNC: 4.3 G/DL — SIGNIFICANT CHANGE UP (ref 3.3–5)
ALP SERPL-CCNC: 79 U/L — SIGNIFICANT CHANGE UP (ref 40–120)
ALP SERPL-CCNC: 81 U/L — SIGNIFICANT CHANGE UP (ref 40–120)
ALT FLD-CCNC: 10 U/L — SIGNIFICANT CHANGE UP (ref 10–45)
ALT FLD-CCNC: 13 U/L — SIGNIFICANT CHANGE UP (ref 10–45)
ANION GAP SERPL CALC-SCNC: 13 MMOL/L — SIGNIFICANT CHANGE UP (ref 5–17)
ANION GAP SERPL CALC-SCNC: 14 MMOL/L — SIGNIFICANT CHANGE UP (ref 5–17)
APTT BLD: 34.3 SEC — SIGNIFICANT CHANGE UP (ref 27.5–35.5)
APTT BLD: 37 SEC — HIGH (ref 27.5–35.5)
AST SERPL-CCNC: 31 U/L — SIGNIFICANT CHANGE UP (ref 10–40)
AST SERPL-CCNC: 38 U/L — SIGNIFICANT CHANGE UP (ref 10–40)
BASE EXCESS BLDV CALC-SCNC: -3.5 MMOL/L — LOW (ref -2–3)
BILIRUB SERPL-MCNC: 1.1 MG/DL — SIGNIFICANT CHANGE UP (ref 0.2–1.2)
BILIRUB SERPL-MCNC: 1.2 MG/DL — SIGNIFICANT CHANGE UP (ref 0.2–1.2)
BUN SERPL-MCNC: 10 MG/DL — SIGNIFICANT CHANGE UP (ref 7–23)
BUN SERPL-MCNC: 11 MG/DL — SIGNIFICANT CHANGE UP (ref 7–23)
CALCIUM SERPL-MCNC: 9.1 MG/DL — SIGNIFICANT CHANGE UP (ref 8.4–10.5)
CALCIUM SERPL-MCNC: 9.5 MG/DL — SIGNIFICANT CHANGE UP (ref 8.4–10.5)
CHLORIDE SERPL-SCNC: 102 MMOL/L — SIGNIFICANT CHANGE UP (ref 96–108)
CHLORIDE SERPL-SCNC: 104 MMOL/L — SIGNIFICANT CHANGE UP (ref 96–108)
CO2 BLDV-SCNC: 23.4 MMOL/L — SIGNIFICANT CHANGE UP (ref 22–26)
CO2 SERPL-SCNC: 21 MMOL/L — LOW (ref 22–31)
CO2 SERPL-SCNC: 21 MMOL/L — LOW (ref 22–31)
CREAT SERPL-MCNC: 0.68 MG/DL — SIGNIFICANT CHANGE UP (ref 0.5–1.3)
CREAT SERPL-MCNC: 0.71 MG/DL — SIGNIFICANT CHANGE UP (ref 0.5–1.3)
EGFR: 86 ML/MIN/1.73M2 — SIGNIFICANT CHANGE UP
EGFR: 89 ML/MIN/1.73M2 — SIGNIFICANT CHANGE UP
GAS PNL BLDA: SIGNIFICANT CHANGE UP
GAS PNL BLDA: SIGNIFICANT CHANGE UP
GAS PNL BLDV: SIGNIFICANT CHANGE UP
GLUCOSE BLDC GLUCOMTR-MCNC: 144 MG/DL — HIGH (ref 70–99)
GLUCOSE BLDC GLUCOMTR-MCNC: 169 MG/DL — HIGH (ref 70–99)
GLUCOSE BLDC GLUCOMTR-MCNC: 244 MG/DL — HIGH (ref 70–99)
GLUCOSE SERPL-MCNC: 256 MG/DL — HIGH (ref 70–99)
GLUCOSE SERPL-MCNC: 300 MG/DL — HIGH (ref 70–99)
HCO3 BLDV-SCNC: 22 MMOL/L — SIGNIFICANT CHANGE UP (ref 22–29)
HCT VFR BLD CALC: 30.7 % — LOW (ref 34.5–45)
HCT VFR BLD CALC: 30.9 % — LOW (ref 34.5–45)
HGB BLD-MCNC: 10.2 G/DL — LOW (ref 11.5–15.5)
HGB BLD-MCNC: 10.2 G/DL — LOW (ref 11.5–15.5)
INR BLD: 1.19 — HIGH (ref 0.88–1.16)
INR BLD: 1.26 — HIGH (ref 0.88–1.16)
LACTATE SERPL-SCNC: 1.2 MMOL/L — SIGNIFICANT CHANGE UP (ref 0.5–2)
LACTATE SERPL-SCNC: 1.3 MMOL/L — SIGNIFICANT CHANGE UP (ref 0.5–2)
MAGNESIUM SERPL-MCNC: 1.8 MG/DL — SIGNIFICANT CHANGE UP (ref 1.6–2.6)
MAGNESIUM SERPL-MCNC: 2.2 MG/DL — SIGNIFICANT CHANGE UP (ref 1.6–2.6)
MCHC RBC-ENTMCNC: 28.5 PG — SIGNIFICANT CHANGE UP (ref 27–34)
MCHC RBC-ENTMCNC: 28.7 PG — SIGNIFICANT CHANGE UP (ref 27–34)
MCHC RBC-ENTMCNC: 33 GM/DL — SIGNIFICANT CHANGE UP (ref 32–36)
MCHC RBC-ENTMCNC: 33.2 GM/DL — SIGNIFICANT CHANGE UP (ref 32–36)
MCV RBC AUTO: 86.3 FL — SIGNIFICANT CHANGE UP (ref 80–100)
MCV RBC AUTO: 86.5 FL — SIGNIFICANT CHANGE UP (ref 80–100)
NRBC # BLD: 0 /100 WBCS — SIGNIFICANT CHANGE UP (ref 0–0)
NRBC # BLD: 0 /100 WBCS — SIGNIFICANT CHANGE UP (ref 0–0)
PCO2 BLDV: 41 MMHG — SIGNIFICANT CHANGE UP (ref 39–42)
PH BLDV: 7.34 — SIGNIFICANT CHANGE UP (ref 7.32–7.43)
PHOSPHATE SERPL-MCNC: 3.7 MG/DL — SIGNIFICANT CHANGE UP (ref 2.5–4.5)
PHOSPHATE SERPL-MCNC: 3.8 MG/DL — SIGNIFICANT CHANGE UP (ref 2.5–4.5)
PLATELET # BLD AUTO: 156 K/UL — SIGNIFICANT CHANGE UP (ref 150–400)
PLATELET # BLD AUTO: 163 K/UL — SIGNIFICANT CHANGE UP (ref 150–400)
PO2 BLDV: 38 MMHG — SIGNIFICANT CHANGE UP (ref 25–45)
POTASSIUM SERPL-MCNC: 4 MMOL/L — SIGNIFICANT CHANGE UP (ref 3.5–5.3)
POTASSIUM SERPL-MCNC: 4.3 MMOL/L — SIGNIFICANT CHANGE UP (ref 3.5–5.3)
POTASSIUM SERPL-SCNC: 4 MMOL/L — SIGNIFICANT CHANGE UP (ref 3.5–5.3)
POTASSIUM SERPL-SCNC: 4.3 MMOL/L — SIGNIFICANT CHANGE UP (ref 3.5–5.3)
PROT SERPL-MCNC: 5.8 G/DL — LOW (ref 6–8.3)
PROT SERPL-MCNC: 5.9 G/DL — LOW (ref 6–8.3)
PROTHROM AB SERPL-ACNC: 14.2 SEC — HIGH (ref 10.5–13.4)
PROTHROM AB SERPL-ACNC: 15 SEC — HIGH (ref 10.5–13.4)
RBC # BLD: 3.55 M/UL — LOW (ref 3.8–5.2)
RBC # BLD: 3.58 M/UL — LOW (ref 3.8–5.2)
RBC # FLD: 13.9 % — SIGNIFICANT CHANGE UP (ref 10.3–14.5)
RBC # FLD: 14.2 % — SIGNIFICANT CHANGE UP (ref 10.3–14.5)
SAO2 % BLDV: 64.5 % — LOW (ref 67–88)
SODIUM SERPL-SCNC: 137 MMOL/L — SIGNIFICANT CHANGE UP (ref 135–145)
SODIUM SERPL-SCNC: 138 MMOL/L — SIGNIFICANT CHANGE UP (ref 135–145)
WBC # BLD: 11.01 K/UL — HIGH (ref 3.8–10.5)
WBC # BLD: 11.03 K/UL — HIGH (ref 3.8–10.5)
WBC # FLD AUTO: 11.01 K/UL — HIGH (ref 3.8–10.5)
WBC # FLD AUTO: 11.03 K/UL — HIGH (ref 3.8–10.5)

## 2022-12-17 PROCEDURE — 99291 CRITICAL CARE FIRST HOUR: CPT

## 2022-12-17 PROCEDURE — 99292 CRITICAL CARE ADDL 30 MIN: CPT

## 2022-12-17 PROCEDURE — 71045 X-RAY EXAM CHEST 1 VIEW: CPT | Mod: 26,77

## 2022-12-17 PROCEDURE — 71045 X-RAY EXAM CHEST 1 VIEW: CPT | Mod: 26

## 2022-12-17 RX ORDER — POTASSIUM CHLORIDE 20 MEQ
20 PACKET (EA) ORAL ONCE
Refills: 0 | Status: COMPLETED | OUTPATIENT
Start: 2022-12-17 | End: 2022-12-17

## 2022-12-17 RX ORDER — FUROSEMIDE 40 MG
20 TABLET ORAL ONCE
Refills: 0 | Status: COMPLETED | OUTPATIENT
Start: 2022-12-17 | End: 2022-12-17

## 2022-12-17 RX ORDER — DEXTROSE 50 % IN WATER 50 %
25 SYRINGE (ML) INTRAVENOUS ONCE
Refills: 0 | Status: DISCONTINUED | OUTPATIENT
Start: 2022-12-17 | End: 2022-12-18

## 2022-12-17 RX ORDER — DEXMEDETOMIDINE HYDROCHLORIDE IN 0.9% SODIUM CHLORIDE 4 UG/ML
0.3 INJECTION INTRAVENOUS
Qty: 200 | Refills: 0 | Status: DISCONTINUED | OUTPATIENT
Start: 2022-12-17 | End: 2022-12-19

## 2022-12-17 RX ORDER — SODIUM CHLORIDE 9 MG/ML
1000 INJECTION, SOLUTION INTRAVENOUS
Refills: 0 | Status: DISCONTINUED | OUTPATIENT
Start: 2022-12-17 | End: 2022-12-18

## 2022-12-17 RX ORDER — NOREPINEPHRINE BITARTRATE/D5W 8 MG/250ML
0.05 PLASTIC BAG, INJECTION (ML) INTRAVENOUS
Qty: 8 | Refills: 0 | Status: DISCONTINUED | OUTPATIENT
Start: 2022-12-17 | End: 2022-12-17

## 2022-12-17 RX ORDER — INSULIN LISPRO 100/ML
VIAL (ML) SUBCUTANEOUS
Refills: 0 | Status: DISCONTINUED | OUTPATIENT
Start: 2022-12-17 | End: 2022-12-18

## 2022-12-17 RX ORDER — DEXTROSE 50 % IN WATER 50 %
15 SYRINGE (ML) INTRAVENOUS ONCE
Refills: 0 | Status: DISCONTINUED | OUTPATIENT
Start: 2022-12-17 | End: 2022-12-18

## 2022-12-17 RX ORDER — GLUCAGON INJECTION, SOLUTION 0.5 MG/.1ML
1 INJECTION, SOLUTION SUBCUTANEOUS ONCE
Refills: 0 | Status: DISCONTINUED | OUTPATIENT
Start: 2022-12-17 | End: 2022-12-18

## 2022-12-17 RX ORDER — MAGNESIUM SULFATE 500 MG/ML
2 VIAL (ML) INJECTION ONCE
Refills: 0 | Status: COMPLETED | OUTPATIENT
Start: 2022-12-17 | End: 2022-12-17

## 2022-12-17 RX ORDER — SODIUM CHLORIDE 9 MG/ML
1000 INJECTION, SOLUTION INTRAVENOUS ONCE
Refills: 0 | Status: COMPLETED | OUTPATIENT
Start: 2022-12-17 | End: 2022-12-17

## 2022-12-17 RX ORDER — DEXTROSE 50 % IN WATER 50 %
12.5 SYRINGE (ML) INTRAVENOUS ONCE
Refills: 0 | Status: DISCONTINUED | OUTPATIENT
Start: 2022-12-17 | End: 2022-12-18

## 2022-12-17 RX ADMIN — HEPARIN SODIUM 5000 UNIT(S): 5000 INJECTION INTRAVENOUS; SUBCUTANEOUS at 05:12

## 2022-12-17 RX ADMIN — Medication 10 MILLIGRAM(S): at 11:51

## 2022-12-17 RX ADMIN — Medication 20 MILLIGRAM(S): at 17:23

## 2022-12-17 RX ADMIN — OXYCODONE HYDROCHLORIDE 10 MILLIGRAM(S): 5 TABLET ORAL at 11:50

## 2022-12-17 RX ADMIN — Medication 25 GRAM(S): at 04:27

## 2022-12-17 RX ADMIN — Medication 100 MILLIGRAM(S): at 02:27

## 2022-12-17 RX ADMIN — Medication 20 MILLIGRAM(S): at 04:00

## 2022-12-17 RX ADMIN — SENNA PLUS 2 TABLET(S): 8.6 TABLET ORAL at 21:12

## 2022-12-17 RX ADMIN — Medication 4: at 11:48

## 2022-12-17 RX ADMIN — HEPARIN SODIUM 5000 UNIT(S): 5000 INJECTION INTRAVENOUS; SUBCUTANEOUS at 21:12

## 2022-12-17 RX ADMIN — OXYCODONE HYDROCHLORIDE 5 MILLIGRAM(S): 5 TABLET ORAL at 18:05

## 2022-12-17 RX ADMIN — CHLORHEXIDINE GLUCONATE 1 APPLICATION(S): 213 SOLUTION TOPICAL at 05:13

## 2022-12-17 RX ADMIN — POLYETHYLENE GLYCOL 3350 17 GRAM(S): 17 POWDER, FOR SOLUTION ORAL at 11:50

## 2022-12-17 RX ADMIN — OXYCODONE HYDROCHLORIDE 10 MILLIGRAM(S): 5 TABLET ORAL at 22:30

## 2022-12-17 RX ADMIN — SODIUM CHLORIDE 1000 MILLILITER(S): 9 INJECTION, SOLUTION INTRAVENOUS at 03:00

## 2022-12-17 RX ADMIN — OXYCODONE HYDROCHLORIDE 10 MILLIGRAM(S): 5 TABLET ORAL at 12:50

## 2022-12-17 RX ADMIN — Medication 100 MICROGRAM(S): at 05:13

## 2022-12-17 RX ADMIN — Medication 81 MILLIGRAM(S): at 11:50

## 2022-12-17 RX ADMIN — OXYCODONE HYDROCHLORIDE 10 MILLIGRAM(S): 5 TABLET ORAL at 21:30

## 2022-12-17 RX ADMIN — PANTOPRAZOLE SODIUM 40 MILLIGRAM(S): 20 TABLET, DELAYED RELEASE ORAL at 07:00

## 2022-12-17 RX ADMIN — OXYCODONE HYDROCHLORIDE 5 MILLIGRAM(S): 5 TABLET ORAL at 19:00

## 2022-12-17 RX ADMIN — CLOPIDOGREL BISULFATE 75 MILLIGRAM(S): 75 TABLET, FILM COATED ORAL at 11:50

## 2022-12-17 RX ADMIN — Medication 100 MILLIEQUIVALENT(S): at 14:47

## 2022-12-17 NOTE — PHYSICAL THERAPY INITIAL EVALUATION ADULT - PERTINENT HX OF CURRENT PROBLEM, REHAB EVAL
77F  presented to outpatient cardiologist Dr. Maravilla with complaints of intermittent substernal sharp chest pain accompanied by WRIGHT with ambulating uphill for the past 6-9 months. 8/25/22 Echo revealed normal LVEF, no valvular disease. 12/2/22 CCTA revealed 3 vessel CAD w/calcium score 1383. 12/9/22 Cardiac cath revealed severe 3 vessel CAD.

## 2022-12-17 NOTE — PHYSICAL THERAPY INITIAL EVALUATION ADULT - ADDITIONAL COMMENTS
independent prior to arrival, history of falls at home, apartment, elevator, 4-5 steps to enter building

## 2022-12-17 NOTE — PHYSICAL THERAPY INITIAL EVALUATION ADULT - GAIT DEVIATIONS NOTED, PT EVAL
freq standing rest breaks 2/2 fatigue, no SOB noted, no LOB/decreased ana paula/decreased step length/decreased weight-shifting ability

## 2022-12-17 NOTE — PROGRESS NOTE ADULT - SUBJECTIVE AND OBJECTIVE BOX
CTICU  CRITICAL  CARE  attending     Hand off received 					   Pertinent clinical, laboratory, radiographic, hemodynamic, echocardiographic, respiratory data, microbiologic data and chart were reviewed and analyzed frequently throughout the course of the day  Patient seen and examined with CTS/ SH attending at bedside  Pt is a 77yr old female with PMH HLD, CAD, DM, left lung ca sp partial lobectomy, hypothyroidism, admitted for surgical mgmt of 3V CAD. Underwent OpCABG x 4 (LIMA-LAD, SVG-Diag, SVG-OM-Diag sequential, nl EF) with Dr. Garces 12/15. Extubated early AM today. Started on dobutamine for RV dysfunction on TTE. Given 1 unit pRBC. Weaned dobutamine overnight. Diuresed.     FAMILY HISTORY:  FH: myocardial infarction (Father)  PAST MEDICAL & SURGICAL HISTORY:  Hyperlipidemia  Diabetes mellitus  Lung cancer  Hypothyroidism  GERD (gastroesophageal reflux disease)  S/P partial lobectomy of lung  left 2012  S/P knee surgery  fractured right patella        Patient is a 77y old  Female who presents with a chief complaint of CAD.      14 system review was unremarkable    Vital signs, hemodynamic and respiratory parameters were reviewed from the bedside nursing flowsheet.  ICU Vital Signs Last 24 Hrs  T(C): 36.9 (17 Dec 2022 17:11), Max: 37.2 (17 Dec 2022 01:01)  T(F): 98.4 (17 Dec 2022 17:11), Max: 98.9 (17 Dec 2022 01:01)  HR: 89 (17 Dec 2022 19:00) (63 - 93)  BP: --  BP(mean): --  ABP: 131/54 (17 Dec 2022 19:00) (88/43 - 178/68)  ABP(mean): 79 (17 Dec 2022 19:00) (58 - 106)  RR: 17 (17 Dec 2022 19:00) (13 - 20)  SpO2: 94% (17 Dec 2022 19:00) (93% - 97%)    O2 Parameters below as of 17 Dec 2022 19:00  Patient On (Oxygen Delivery Method): nasal cannula w/ humidification  O2 Flow (L/min): 4        Adult Advanced Hemodynamics Last 24 Hrs  CVP(mm Hg): 8 (17 Dec 2022 19:00) (2 - 19)  CVP(cm H2O): --  CO: --  CI: --  PA: --  PA(mean): --  PCWP: --  SVR: --  SVRI: --  PVR: --  PVRI: --, ABG - ( 17 Dec 2022 10:29 )  pH, Arterial: 7.39  pH, Blood: x     /  pCO2: 34    /  pO2: 74    / HCO3: 21    / Base Excess: -3.6  /  SaO2: 96.3          Intake and output was reviewed and the fluid balance was calculated  Daily     Daily   I&O's Summary    16 Dec 2022 07:01  -  17 Dec 2022 07:00  --------------------------------------------------------  IN: 3904.8 mL / OUT: 2675 mL / NET: 1229.8 mL    17 Dec 2022 07:01  -  17 Dec 2022 20:06  --------------------------------------------------------  IN: 880 mL / OUT: 1035 mL / NET: -155 mL        All lines and drain sites were assessed  Glycemic trend was reviewed    POCT Blood Glucose.: 144 mg/dL (17 Dec 2022 17:07)    Neuro: pleasant  HEENT: mmm  Heart: s1 s2  Lungs: clear bl   Abdomen: soft nt nd  Extremities: 2+dp    Lines:  RIj TLC 12/15  L radial arterial line 12/15    Tubes:  2 bulbs      labs  CBC Full  -  ( 17 Dec 2022 10:18 )  WBC Count : 11.03 K/uL  RBC Count : 3.58 M/uL  Hemoglobin : 10.2 g/dL  Hematocrit : 30.9 %  Platelet Count - Automated : 163 K/uL  Mean Cell Volume : 86.3 fl  Mean Cell Hemoglobin : 28.5 pg  Mean Cell Hemoglobin Concentration : 33.0 gm/dL  Auto Neutrophil # : x  Auto Lymphocyte # : x  Auto Monocyte # : x  Auto Eosinophil # : x  Auto Basophil # : x  Auto Neutrophil % : x  Auto Lymphocyte % : x  Auto Monocyte % : x  Auto Eosinophil % : x  Auto Basophil % : x    12-17    137  |  102  |  11  ----------------------------<  300<H>  4.0   |  21<L>  |  0.71    Ca    9.1      17 Dec 2022 10:18  Phos  3.8     12-17  Mg     2.2     12-17    TPro  5.8<L>  /  Alb  3.9  /  TBili  1.2  /  DBili  x   /  AST  31  /  ALT  10  /  AlkPhos  79  12-17    PT/INR - ( 17 Dec 2022 10:18 )   PT: 14.2 sec;   INR: 1.19          PTT - ( 17 Dec 2022 10:18 )  PTT:37.0 sec  The current medications were reviewed   MEDICATIONS  (STANDING):  aspirin enteric coated 81 milliGRAM(s) Oral daily  chlorhexidine 2% Cloths 1 Application(s) Topical <User Schedule>  clopidogrel Tablet 75 milliGRAM(s) Oral daily  dexMEDEtomidine Infusion 0.3 MICROgram(s)/kG/Hr (3.95 mL/Hr) IV Continuous <Continuous>  dextrose 5%. 1000 milliLiter(s) (50 mL/Hr) IV Continuous <Continuous>  dextrose 5%. 1000 milliLiter(s) (100 mL/Hr) IV Continuous <Continuous>  dextrose 50% Injectable 25 Gram(s) IV Push once  dextrose 50% Injectable 12.5 Gram(s) IV Push once  dextrose 50% Injectable 25 Gram(s) IV Push once  DOBUTamine Infusion 2.5 MICROgram(s)/kG/Min (3.95 mL/Hr) IV Continuous <Continuous>  glucagon  Injectable 1 milliGRAM(s) IntraMuscular once  heparin   Injectable 5000 Unit(s) SubCutaneous every 8 hours  insulin lispro (ADMELOG) corrective regimen sliding scale   SubCutaneous Before meals and at bedtime  levothyroxine 100 MICROGram(s) Oral daily  norepinephrine Infusion 0.05 MICROgram(s)/kG/Min (4.93 mL/Hr) IV Continuous <Continuous>  pantoprazole    Tablet 40 milliGRAM(s) Oral daily  PARoxetine 10 milliGRAM(s) Oral daily  polyethylene glycol 3350 17 Gram(s) Oral daily  senna 2 Tablet(s) Oral at bedtime  sodium chloride 0.9%. 1000 milliLiter(s) (10 mL/Hr) IV Continuous <Continuous>    MEDICATIONS  (PRN):  acetaminophen     Tablet .. 650 milliGRAM(s) Oral every 6 hours PRN Mild Pain (1 - 3)  dextrose Oral Gel 15 Gram(s) Oral once PRN Blood Glucose LESS THAN 70 milliGRAM(s)/deciliter  oxyCODONE    IR 5 milliGRAM(s) Oral every 6 hours PRN Moderate Pain (4 - 6)  oxyCODONE    IR 10 milliGRAM(s) Oral every 6 hours PRN Severe Pain (7 - 10)      Assessment/Plan:  77yr old female with PMH HLD, CAD, DM, left lung ca sp partial lobectomy, hypothyroidism, admitted for surgical mgmt of 3V CAD.     POD2 OpCABG x 4 (LIMA-LAD, SVG-Diag, SVG-OM-Diag sequential, nl EF, Garces, 12/15)  Acute post operative anemia-trend H/H  Cardiogenic shock on dobutamine-serial labs, wean  Periop Abx  Aspirin  Plavix  Lasix prn  Diet as tolerated  Replete lytes prn  Monitor CT output  GI/DVT PPX  Bowel Regimen  Pain control  OOB with PT  Close hemodynamic, ventilatory and drain monitoring and management per post op routine  Monitor for arrhythmias and monitor parameters for organ perfusion  Beta blockade not administered due to hemodynamic instability and bradycardia  Monitor neurologic status  Head of the bed should remain elevated to 45 deg   Chest PT and IS will be encouraged  Monitor adequacy of oxygenation and ventilation and attempt to wean oxygen  Antibiotic regimen will be tailored to the clinical, laboratory and microbiologic data  Nutritional goals will be met using po eventually, ensure adequate caloric intake and monitor the same  Stress ulcer and VTE prophylaxis will be achieved    Glycemic control is satisfactory  Electrolytes have been repleted as necessary and wound care has been carried out   Pain control has been achieved.   Aggressive physical therapy and early mobility and ambulation goals will be met   The family was updated about the course and plan.    CRITICAL CARE TIME personally provided by me  in evaluation and management, reassessments, review and interpretation of labs and x-rays, ventilator and hemodynamic management, formulating a plan and coordinating care: ___30____ MIN.  Time does not include procedural time.    CTICU ATTENDING     					  Beverly Burciaga MD

## 2022-12-17 NOTE — PHYSICAL THERAPY INITIAL EVALUATION ADULT - GENERAL OBSERVATIONS, REHAB EVAL
Received sitting in chair complaints of back pain +EKG, o2 NC 4L, JOSEMANUEL, jordan, IV, devon, TLC, temp PPM, EKG. Left as found +lines intact, RN aelx aware, call bell

## 2022-12-17 NOTE — PROGRESS NOTE ADULT - SUBJECTIVE AND OBJECTIVE BOX
CTICU  CRITICAL  CARE  attending     Hand off received 					   Pertinent clinical, laboratory, radiographic, hemodynamic, echocardiographic, respiratory data, microbiologic data and chart were reviewed and analyzed frequently throughout the course of the day and night  Patient seen and examined with CTS/ SH attending at bedside    Pt is a 77y , Female, post op day # 2 s/p CABG x 4V; off pump;    post op:    Extubated   Hypotensive  Features of low cardiac output state  TTE: showing hyperdynamic LV and reduced RV fn with mild to moderate TR  Mild lactic acidosis  started on Inotrope support with dobutamine  acute post H'gic anemia  s/p 1 unit PRBC    today:    on dobutamine @ 2.5 mcgs  diuresed  better hemodynamics compared to yesterday    Drips:    Dobutamine @2.5 mcgs  regular insulin        , FAMILY HISTORY:  FH: myocardial infarction (Father)    PAST MEDICAL & SURGICAL HISTORY:  Hyperlipidemia      Diabetes mellitus      Lung cancer      Hypothyroidism      GERD (gastroesophageal reflux disease)      S/P partial lobectomy of lung  left 2012      S/P knee surgery  fractured right patella        Patient is a 77y old  Female who presents with a chief complaint of CAD (16 Dec 2022 20:26)      14 system review limited 2/2 post op morbidity    Vital signs, hemodynamic and respiratory parameters were reviewed from the bedside nursing flowsheet.  ICU Vital Signs Last 24 Hrs  T(C): 36.9 (17 Dec 2022 13:45), Max: 37.2 (17 Dec 2022 01:01)  T(F): 98.5 (17 Dec 2022 13:45), Max: 98.9 (17 Dec 2022 01:01)  HR: 86 (17 Dec 2022 15:00) (63 - 96)  BP: --  BP(mean): --  ABP: 135/55 (17 Dec 2022 15:00) (88/43 - 178/68)  ABP(mean): 79 (17 Dec 2022 15:00) (58 - 106)  RR: 16 (17 Dec 2022 15:00) (13 - 20)  SpO2: 93% (17 Dec 2022 15:00) (91% - 98%)    O2 Parameters below as of 17 Dec 2022 15:00  Patient On (Oxygen Delivery Method): nasal cannula w/ humidification  O2 Flow (L/min): 4        Adult Advanced Hemodynamics Last 24 Hrs  CVP(mm Hg): 8 (17 Dec 2022 15:00) (-2 - 19)  CVP(cm H2O): --  CO: --  CI: --  PA: --  PA(mean): --  PCWP: --  SVR: --  SVRI: --  PVR: --  PVRI: --, ABG - ( 17 Dec 2022 10:29 )  pH, Arterial: 7.39  pH, Blood: x     /  pCO2: 34    /  pO2: 74    / HCO3: 21    / Base Excess: -3.6  /  SaO2: 96.3                Intake and output was reviewed and the fluid balance was calculated  Daily     Daily   I&O's Summary    16 Dec 2022 07:01  -  17 Dec 2022 07:00  --------------------------------------------------------  IN: 3904.8 mL / OUT: 2675 mL / NET: 1229.8 mL    17 Dec 2022 07:01  -  17 Dec 2022 16:12  --------------------------------------------------------  IN: 690 mL / OUT: 375 mL / NET: 315 mL        All lines and drain sites were assessed  Glycemic trend was reviewedCAPILLARY BLOOD GLUCOSE      POCT Blood Glucose.: 244 mg/dL (17 Dec 2022 11:45)    No acute change in mental status  Auscultation of the chest reveals equal bs  Abdomen is soft  Extremities are warm and well perfused  Wounds appear clean and unremarkable  Antibiotics are periop    labs  CBC Full  -  ( 17 Dec 2022 10:18 )  WBC Count : 11.03 K/uL  RBC Count : 3.58 M/uL  Hemoglobin : 10.2 g/dL  Hematocrit : 30.9 %  Platelet Count - Automated : 163 K/uL  Mean Cell Volume : 86.3 fl  Mean Cell Hemoglobin : 28.5 pg  Mean Cell Hemoglobin Concentration : 33.0 gm/dL  Auto Neutrophil # : x  Auto Lymphocyte # : x  Auto Monocyte # : x  Auto Eosinophil # : x  Auto Basophil # : x  Auto Neutrophil % : x  Auto Lymphocyte % : x  Auto Monocyte % : x  Auto Eosinophil % : x  Auto Basophil % : x    12-17    137  |  102  |  11  ----------------------------<  300<H>  4.0   |  21<L>  |  0.71    Ca    9.1      17 Dec 2022 10:18  Phos  3.8     12-17  Mg     2.2     12-17    TPro  5.8<L>  /  Alb  3.9  /  TBili  1.2  /  DBili  x   /  AST  31  /  ALT  10  /  AlkPhos  79  12-17    PT/INR - ( 17 Dec 2022 10:18 )   PT: 14.2 sec;   INR: 1.19          PTT - ( 17 Dec 2022 10:18 )  PTT:37.0 sec  The current medications were reviewed   MEDICATIONS  (STANDING):  aspirin enteric coated 81 milliGRAM(s) Oral daily  chlorhexidine 2% Cloths 1 Application(s) Topical <User Schedule>  clopidogrel Tablet 75 milliGRAM(s) Oral daily  dexMEDEtomidine Infusion 0.3 MICROgram(s)/kG/Hr (3.95 mL/Hr) IV Continuous <Continuous>  dextrose 5%. 1000 milliLiter(s) (50 mL/Hr) IV Continuous <Continuous>  dextrose 5%. 1000 milliLiter(s) (100 mL/Hr) IV Continuous <Continuous>  dextrose 50% Injectable 25 Gram(s) IV Push once  dextrose 50% Injectable 12.5 Gram(s) IV Push once  dextrose 50% Injectable 25 Gram(s) IV Push once  DOBUTamine Infusion 2.5 MICROgram(s)/kG/Min (3.95 mL/Hr) IV Continuous <Continuous>  furosemide   Injectable 20 milliGRAM(s) IV Push once  glucagon  Injectable 1 milliGRAM(s) IntraMuscular once  heparin   Injectable 5000 Unit(s) SubCutaneous every 8 hours  insulin lispro (ADMELOG) corrective regimen sliding scale   SubCutaneous Before meals and at bedtime  levothyroxine 100 MICROGram(s) Oral daily  norepinephrine Infusion 0.05 MICROgram(s)/kG/Min (4.93 mL/Hr) IV Continuous <Continuous>  pantoprazole    Tablet 40 milliGRAM(s) Oral daily  PARoxetine 10 milliGRAM(s) Oral daily  polyethylene glycol 3350 17 Gram(s) Oral daily  senna 2 Tablet(s) Oral at bedtime  sodium chloride 0.9%. 1000 milliLiter(s) (10 mL/Hr) IV Continuous <Continuous>    MEDICATIONS  (PRN):  acetaminophen     Tablet .. 650 milliGRAM(s) Oral every 6 hours PRN Mild Pain (1 - 3)  dextrose Oral Gel 15 Gram(s) Oral once PRN Blood Glucose LESS THAN 70 milliGRAM(s)/deciliter  oxyCODONE    IR 5 milliGRAM(s) Oral every 6 hours PRN Moderate Pain (4 - 6)  oxyCODONE    IR 10 milliGRAM(s) Oral every 6 hours PRN Severe Pain (7 - 10)       PROBLEM LIST/ ASSESSMENT:  HEALTH ISSUES - PROBLEM Dx:      ,   Patient is a 77y old  Female who presents with a chief complaint of CAD (16 Dec 2022 20:26)     s/p CABG x 3V      My plan includes :     maintain MAP >70  Titrate Inotrope support to maintain normal lactate and other metrics of end organ perfusion  Trend Lactate levels  Titrate supplemental O2 to maintain Sao2 >95%  Serial ABGs  Diurese for negative fluid balance    close hemodynamic, ventilatory and drain monitoring and management per post op routine    Monitor for arrhythmias and monitor parameters for organ perfusion  monitor neurologic status  Head of the bed should remain elevated to 45 deg .   chest PT and IS will be encouraged  monitor adequacy of oxygenation and ventilation and attempt to wean oxygen  Nutritional goals will be met using po eventually , ensure adequate caloric intake and montior the same  Stress ulcer and VTE prophylaxis will be achieved    Glycemic control is satisfactory  Electrolytes have been repleted as necessary and wound care has been carried out. Pain control has been achieved.   agressive physical therapy and early mobility and ambulation goals will be met   The family was updated about the course and plan  CRITICAL CARE TIME SPENT in evaluation and management, reassessments, review and interpretation of labs and x-rays, ventilator and hemodynamic management, formulating a plan and coordinating care: ___90____ MIN.  Time does not include procedural time.  CTICU ATTENDING     					    Duke Best MD

## 2022-12-18 LAB
ALBUMIN SERPL ELPH-MCNC: 3.9 G/DL — SIGNIFICANT CHANGE UP (ref 3.3–5)
ALBUMIN SERPL ELPH-MCNC: 3.9 G/DL — SIGNIFICANT CHANGE UP (ref 3.3–5)
ALP SERPL-CCNC: 84 U/L — SIGNIFICANT CHANGE UP (ref 40–120)
ALP SERPL-CCNC: 97 U/L — SIGNIFICANT CHANGE UP (ref 40–120)
ALT FLD-CCNC: 8 U/L — LOW (ref 10–45)
ALT FLD-CCNC: 9 U/L — LOW (ref 10–45)
ANION GAP SERPL CALC-SCNC: 11 MMOL/L — SIGNIFICANT CHANGE UP (ref 5–17)
ANION GAP SERPL CALC-SCNC: 12 MMOL/L — SIGNIFICANT CHANGE UP (ref 5–17)
ANION GAP SERPL CALC-SCNC: 15 MMOL/L — SIGNIFICANT CHANGE UP (ref 5–17)
APTT BLD: 26.8 SEC — LOW (ref 27.5–35.5)
APTT BLD: 31.8 SEC — SIGNIFICANT CHANGE UP (ref 27.5–35.5)
APTT BLD: 33.3 SEC — SIGNIFICANT CHANGE UP (ref 27.5–35.5)
AST SERPL-CCNC: 20 U/L — SIGNIFICANT CHANGE UP (ref 10–40)
AST SERPL-CCNC: 21 U/L — SIGNIFICANT CHANGE UP (ref 10–40)
BASE EXCESS BLDV CALC-SCNC: -3 MMOL/L — LOW (ref -2–3)
BASE EXCESS BLDV CALC-SCNC: 0.3 MMOL/L — SIGNIFICANT CHANGE UP (ref -2–3)
BASE EXCESS BLDV CALC-SCNC: 1.9 MMOL/L — SIGNIFICANT CHANGE UP (ref -2–3)
BILIRUB SERPL-MCNC: 1.1 MG/DL — SIGNIFICANT CHANGE UP (ref 0.2–1.2)
BILIRUB SERPL-MCNC: 1.1 MG/DL — SIGNIFICANT CHANGE UP (ref 0.2–1.2)
BUN SERPL-MCNC: 10 MG/DL — SIGNIFICANT CHANGE UP (ref 7–23)
BUN SERPL-MCNC: 10 MG/DL — SIGNIFICANT CHANGE UP (ref 7–23)
BUN SERPL-MCNC: 12 MG/DL — SIGNIFICANT CHANGE UP (ref 7–23)
CA-I SERPL-SCNC: 1.2 MMOL/L — SIGNIFICANT CHANGE UP (ref 1.15–1.33)
CA-I SERPL-SCNC: 1.21 MMOL/L — SIGNIFICANT CHANGE UP (ref 1.15–1.33)
CALCIUM SERPL-MCNC: 8.7 MG/DL — SIGNIFICANT CHANGE UP (ref 8.4–10.5)
CALCIUM SERPL-MCNC: 8.8 MG/DL — SIGNIFICANT CHANGE UP (ref 8.4–10.5)
CALCIUM SERPL-MCNC: 9 MG/DL — SIGNIFICANT CHANGE UP (ref 8.4–10.5)
CHLORIDE SERPL-SCNC: 102 MMOL/L — SIGNIFICANT CHANGE UP (ref 96–108)
CHLORIDE SERPL-SCNC: 103 MMOL/L — SIGNIFICANT CHANGE UP (ref 96–108)
CHLORIDE SERPL-SCNC: 97 MMOL/L — SIGNIFICANT CHANGE UP (ref 96–108)
CO2 BLDV-SCNC: 24 MMOL/L — SIGNIFICANT CHANGE UP (ref 22–26)
CO2 BLDV-SCNC: 27.4 MMOL/L — HIGH (ref 22–26)
CO2 BLDV-SCNC: 27.9 MMOL/L — HIGH (ref 22–26)
CO2 SERPL-SCNC: 21 MMOL/L — LOW (ref 22–31)
CO2 SERPL-SCNC: 25 MMOL/L — SIGNIFICANT CHANGE UP (ref 22–31)
CO2 SERPL-SCNC: 25 MMOL/L — SIGNIFICANT CHANGE UP (ref 22–31)
CREAT SERPL-MCNC: 0.57 MG/DL — SIGNIFICANT CHANGE UP (ref 0.5–1.3)
CREAT SERPL-MCNC: 0.6 MG/DL — SIGNIFICANT CHANGE UP (ref 0.5–1.3)
CREAT SERPL-MCNC: 0.6 MG/DL — SIGNIFICANT CHANGE UP (ref 0.5–1.3)
EGFR: 91 ML/MIN/1.73M2 — SIGNIFICANT CHANGE UP
EGFR: 91 ML/MIN/1.73M2 — SIGNIFICANT CHANGE UP
EGFR: 92 ML/MIN/1.73M2 — SIGNIFICANT CHANGE UP
GAS PNL BLDA: SIGNIFICANT CHANGE UP
GAS PNL BLDV: 134 MMOL/L — LOW (ref 136–145)
GAS PNL BLDV: 138 MMOL/L — SIGNIFICANT CHANGE UP (ref 136–145)
GAS PNL BLDV: SIGNIFICANT CHANGE UP
GLUCOSE BLDC GLUCOMTR-MCNC: 120 MG/DL — HIGH (ref 70–99)
GLUCOSE BLDC GLUCOMTR-MCNC: 173 MG/DL — HIGH (ref 70–99)
GLUCOSE BLDC GLUCOMTR-MCNC: 237 MG/DL — HIGH (ref 70–99)
GLUCOSE BLDC GLUCOMTR-MCNC: 239 MG/DL — HIGH (ref 70–99)
GLUCOSE SERPL-MCNC: 125 MG/DL — HIGH (ref 70–99)
GLUCOSE SERPL-MCNC: 246 MG/DL — HIGH (ref 70–99)
GLUCOSE SERPL-MCNC: 257 MG/DL — HIGH (ref 70–99)
HCO3 BLDV-SCNC: 23 MMOL/L — SIGNIFICANT CHANGE UP (ref 22–29)
HCO3 BLDV-SCNC: 26 MMOL/L — SIGNIFICANT CHANGE UP (ref 22–29)
HCO3 BLDV-SCNC: 26 MMOL/L — SIGNIFICANT CHANGE UP (ref 22–29)
HCT VFR BLD CALC: 29.8 % — LOW (ref 34.5–45)
HCT VFR BLD CALC: 30 % — LOW (ref 34.5–45)
HCT VFR BLD CALC: 30.3 % — LOW (ref 34.5–45)
HGB BLD-MCNC: 10 G/DL — LOW (ref 11.5–15.5)
HGB BLD-MCNC: 9.9 G/DL — LOW (ref 11.5–15.5)
HGB BLD-MCNC: 9.9 G/DL — LOW (ref 11.5–15.5)
INR BLD: 1.03 — SIGNIFICANT CHANGE UP (ref 0.88–1.16)
INR BLD: 1.07 — SIGNIFICANT CHANGE UP (ref 0.88–1.16)
INR BLD: 1.13 — SIGNIFICANT CHANGE UP (ref 0.88–1.16)
LACTATE SERPL-SCNC: 1.1 MMOL/L — SIGNIFICANT CHANGE UP (ref 0.5–2)
MAGNESIUM SERPL-MCNC: 1.7 MG/DL — SIGNIFICANT CHANGE UP (ref 1.6–2.6)
MAGNESIUM SERPL-MCNC: 2.2 MG/DL — SIGNIFICANT CHANGE UP (ref 1.6–2.6)
MAGNESIUM SERPL-MCNC: 2.7 MG/DL — HIGH (ref 1.6–2.6)
MCHC RBC-ENTMCNC: 28.8 PG — SIGNIFICANT CHANGE UP (ref 27–34)
MCHC RBC-ENTMCNC: 28.8 PG — SIGNIFICANT CHANGE UP (ref 27–34)
MCHC RBC-ENTMCNC: 28.9 PG — SIGNIFICANT CHANGE UP (ref 27–34)
MCHC RBC-ENTMCNC: 33 GM/DL — SIGNIFICANT CHANGE UP (ref 32–36)
MCHC RBC-ENTMCNC: 33 GM/DL — SIGNIFICANT CHANGE UP (ref 32–36)
MCHC RBC-ENTMCNC: 33.2 GM/DL — SIGNIFICANT CHANGE UP (ref 32–36)
MCV RBC AUTO: 86.6 FL — SIGNIFICANT CHANGE UP (ref 80–100)
MCV RBC AUTO: 87.3 FL — SIGNIFICANT CHANGE UP (ref 80–100)
MCV RBC AUTO: 87.7 FL — SIGNIFICANT CHANGE UP (ref 80–100)
NRBC # BLD: 0 /100 WBCS — SIGNIFICANT CHANGE UP (ref 0–0)
PCO2 BLDV: 41 MMHG — SIGNIFICANT CHANGE UP (ref 39–42)
PCO2 BLDV: 42 MMHG — SIGNIFICANT CHANGE UP (ref 39–42)
PCO2 BLDV: 45 MMHG — HIGH (ref 39–42)
PH BLDV: 7.34 — SIGNIFICANT CHANGE UP (ref 7.32–7.43)
PH BLDV: 7.37 — SIGNIFICANT CHANGE UP (ref 7.32–7.43)
PH BLDV: 7.42 — SIGNIFICANT CHANGE UP (ref 7.32–7.43)
PHOSPHATE SERPL-MCNC: 2 MG/DL — LOW (ref 2.5–4.5)
PHOSPHATE SERPL-MCNC: 2.7 MG/DL — SIGNIFICANT CHANGE UP (ref 2.5–4.5)
PHOSPHATE SERPL-MCNC: 2.7 MG/DL — SIGNIFICANT CHANGE UP (ref 2.5–4.5)
PLATELET # BLD AUTO: 181 K/UL — SIGNIFICANT CHANGE UP (ref 150–400)
PLATELET # BLD AUTO: 189 K/UL — SIGNIFICANT CHANGE UP (ref 150–400)
PLATELET # BLD AUTO: 209 K/UL — SIGNIFICANT CHANGE UP (ref 150–400)
PO2 BLDV: 33 MMHG — SIGNIFICANT CHANGE UP (ref 25–45)
PO2 BLDV: 38 MMHG — SIGNIFICANT CHANGE UP (ref 25–45)
PO2 BLDV: <33 MMHG — SIGNIFICANT CHANGE UP (ref 25–45)
POTASSIUM BLDV-SCNC: 3.4 MMOL/L — LOW (ref 3.5–5.1)
POTASSIUM BLDV-SCNC: 3.5 MMOL/L — SIGNIFICANT CHANGE UP (ref 3.5–5.1)
POTASSIUM SERPL-MCNC: 3.5 MMOL/L — SIGNIFICANT CHANGE UP (ref 3.5–5.3)
POTASSIUM SERPL-MCNC: 3.5 MMOL/L — SIGNIFICANT CHANGE UP (ref 3.5–5.3)
POTASSIUM SERPL-MCNC: 3.6 MMOL/L — SIGNIFICANT CHANGE UP (ref 3.5–5.3)
POTASSIUM SERPL-SCNC: 3.5 MMOL/L — SIGNIFICANT CHANGE UP (ref 3.5–5.3)
POTASSIUM SERPL-SCNC: 3.5 MMOL/L — SIGNIFICANT CHANGE UP (ref 3.5–5.3)
POTASSIUM SERPL-SCNC: 3.6 MMOL/L — SIGNIFICANT CHANGE UP (ref 3.5–5.3)
PROT SERPL-MCNC: 6 G/DL — SIGNIFICANT CHANGE UP (ref 6–8.3)
PROT SERPL-MCNC: 6.2 G/DL — SIGNIFICANT CHANGE UP (ref 6–8.3)
PROTHROM AB SERPL-ACNC: 12.3 SEC — SIGNIFICANT CHANGE UP (ref 10.5–13.4)
PROTHROM AB SERPL-ACNC: 12.7 SEC — SIGNIFICANT CHANGE UP (ref 10.5–13.4)
PROTHROM AB SERPL-ACNC: 13.5 SEC — HIGH (ref 10.5–13.4)
RBC # BLD: 3.42 M/UL — LOW (ref 3.8–5.2)
RBC # BLD: 3.44 M/UL — LOW (ref 3.8–5.2)
RBC # BLD: 3.47 M/UL — LOW (ref 3.8–5.2)
RBC # FLD: 14 % — SIGNIFICANT CHANGE UP (ref 10.3–14.5)
RBC # FLD: 14.2 % — SIGNIFICANT CHANGE UP (ref 10.3–14.5)
RBC # FLD: 14.2 % — SIGNIFICANT CHANGE UP (ref 10.3–14.5)
SAO2 % BLDV: 50.3 % — LOW (ref 67–88)
SAO2 % BLDV: 55.2 % — LOW (ref 67–88)
SAO2 % BLDV: 57.5 % — LOW (ref 67–88)
SODIUM SERPL-SCNC: 133 MMOL/L — LOW (ref 135–145)
SODIUM SERPL-SCNC: 139 MMOL/L — SIGNIFICANT CHANGE UP (ref 135–145)
SODIUM SERPL-SCNC: 139 MMOL/L — SIGNIFICANT CHANGE UP (ref 135–145)
WBC # BLD: 11.19 K/UL — HIGH (ref 3.8–10.5)
WBC # BLD: 11.24 K/UL — HIGH (ref 3.8–10.5)
WBC # BLD: 11.64 K/UL — HIGH (ref 3.8–10.5)
WBC # FLD AUTO: 11.19 K/UL — HIGH (ref 3.8–10.5)
WBC # FLD AUTO: 11.24 K/UL — HIGH (ref 3.8–10.5)
WBC # FLD AUTO: 11.64 K/UL — HIGH (ref 3.8–10.5)

## 2022-12-18 PROCEDURE — 71045 X-RAY EXAM CHEST 1 VIEW: CPT | Mod: 26

## 2022-12-18 PROCEDURE — 99221 1ST HOSP IP/OBS SF/LOW 40: CPT | Mod: GC

## 2022-12-18 PROCEDURE — 99291 CRITICAL CARE FIRST HOUR: CPT

## 2022-12-18 PROCEDURE — 99292 CRITICAL CARE ADDL 30 MIN: CPT

## 2022-12-18 RX ORDER — INSULIN LISPRO 100/ML
VIAL (ML) SUBCUTANEOUS
Refills: 0 | Status: DISCONTINUED | OUTPATIENT
Start: 2022-12-18 | End: 2022-12-21

## 2022-12-18 RX ORDER — INSULIN GLARGINE 100 [IU]/ML
8 INJECTION, SOLUTION SUBCUTANEOUS AT BEDTIME
Refills: 0 | Status: DISCONTINUED | OUTPATIENT
Start: 2022-12-18 | End: 2022-12-18

## 2022-12-18 RX ORDER — POTASSIUM PHOSPHATE, MONOBASIC POTASSIUM PHOSPHATE, DIBASIC 236; 224 MG/ML; MG/ML
15 INJECTION, SOLUTION INTRAVENOUS ONCE
Refills: 0 | Status: COMPLETED | OUTPATIENT
Start: 2022-12-18 | End: 2022-12-18

## 2022-12-18 RX ORDER — POTASSIUM CHLORIDE 20 MEQ
40 PACKET (EA) ORAL ONCE
Refills: 0 | Status: COMPLETED | OUTPATIENT
Start: 2022-12-18 | End: 2022-12-18

## 2022-12-18 RX ORDER — DEXTROSE 50 % IN WATER 50 %
25 SYRINGE (ML) INTRAVENOUS ONCE
Refills: 0 | Status: DISCONTINUED | OUTPATIENT
Start: 2022-12-18 | End: 2022-12-21

## 2022-12-18 RX ORDER — DEXTROSE 50 % IN WATER 50 %
12.5 SYRINGE (ML) INTRAVENOUS ONCE
Refills: 0 | Status: DISCONTINUED | OUTPATIENT
Start: 2022-12-18 | End: 2022-12-21

## 2022-12-18 RX ORDER — INSULIN LISPRO 100/ML
4 VIAL (ML) SUBCUTANEOUS
Refills: 0 | Status: DISCONTINUED | OUTPATIENT
Start: 2022-12-18 | End: 2022-12-21

## 2022-12-18 RX ORDER — MAGNESIUM SULFATE 500 MG/ML
2 VIAL (ML) INJECTION ONCE
Refills: 0 | Status: COMPLETED | OUTPATIENT
Start: 2022-12-18 | End: 2022-12-18

## 2022-12-18 RX ORDER — POTASSIUM CHLORIDE 20 MEQ
20 PACKET (EA) ORAL
Refills: 0 | Status: COMPLETED | OUTPATIENT
Start: 2022-12-18 | End: 2022-12-18

## 2022-12-18 RX ORDER — FUROSEMIDE 40 MG
20 TABLET ORAL ONCE
Refills: 0 | Status: COMPLETED | OUTPATIENT
Start: 2022-12-18 | End: 2022-12-18

## 2022-12-18 RX ORDER — HUMAN INSULIN 100 [IU]/ML
8 INJECTION, SUSPENSION SUBCUTANEOUS ONCE
Refills: 0 | Status: COMPLETED | OUTPATIENT
Start: 2022-12-18 | End: 2022-12-18

## 2022-12-18 RX ORDER — INSULIN LISPRO 100/ML
3 VIAL (ML) SUBCUTANEOUS
Refills: 0 | Status: DISCONTINUED | OUTPATIENT
Start: 2022-12-18 | End: 2022-12-18

## 2022-12-18 RX ORDER — INSULIN GLARGINE 100 [IU]/ML
12 INJECTION, SOLUTION SUBCUTANEOUS AT BEDTIME
Refills: 0 | Status: DISCONTINUED | OUTPATIENT
Start: 2022-12-18 | End: 2022-12-21

## 2022-12-18 RX ADMIN — Medication 10 MILLIGRAM(S): at 11:54

## 2022-12-18 RX ADMIN — SENNA PLUS 2 TABLET(S): 8.6 TABLET ORAL at 22:07

## 2022-12-18 RX ADMIN — PANTOPRAZOLE SODIUM 40 MILLIGRAM(S): 20 TABLET, DELAYED RELEASE ORAL at 11:54

## 2022-12-18 RX ADMIN — HEPARIN SODIUM 5000 UNIT(S): 5000 INJECTION INTRAVENOUS; SUBCUTANEOUS at 06:32

## 2022-12-18 RX ADMIN — OXYCODONE HYDROCHLORIDE 10 MILLIGRAM(S): 5 TABLET ORAL at 17:22

## 2022-12-18 RX ADMIN — Medication 4 UNIT(S): at 17:20

## 2022-12-18 RX ADMIN — Medication 50 MILLIEQUIVALENT(S): at 23:01

## 2022-12-18 RX ADMIN — Medication 100 MICROGRAM(S): at 06:32

## 2022-12-18 RX ADMIN — CHLORHEXIDINE GLUCONATE 1 APPLICATION(S): 213 SOLUTION TOPICAL at 06:00

## 2022-12-18 RX ADMIN — Medication 50 MILLIEQUIVALENT(S): at 22:08

## 2022-12-18 RX ADMIN — Medication 40 MILLIEQUIVALENT(S): at 14:47

## 2022-12-18 RX ADMIN — CLOPIDOGREL BISULFATE 75 MILLIGRAM(S): 75 TABLET, FILM COATED ORAL at 11:54

## 2022-12-18 RX ADMIN — POTASSIUM PHOSPHATE, MONOBASIC POTASSIUM PHOSPHATE, DIBASIC 62.5 MILLIMOLE(S): 236; 224 INJECTION, SOLUTION INTRAVENOUS at 22:50

## 2022-12-18 RX ADMIN — HEPARIN SODIUM 5000 UNIT(S): 5000 INJECTION INTRAVENOUS; SUBCUTANEOUS at 14:46

## 2022-12-18 RX ADMIN — Medication 81 MILLIGRAM(S): at 11:55

## 2022-12-18 RX ADMIN — INSULIN GLARGINE 12 UNIT(S): 100 INJECTION, SOLUTION SUBCUTANEOUS at 22:08

## 2022-12-18 RX ADMIN — Medication 2: at 17:21

## 2022-12-18 RX ADMIN — POLYETHYLENE GLYCOL 3350 17 GRAM(S): 17 POWDER, FOR SOLUTION ORAL at 11:55

## 2022-12-18 RX ADMIN — Medication 25 GRAM(S): at 06:40

## 2022-12-18 RX ADMIN — Medication 4: at 11:54

## 2022-12-18 RX ADMIN — Medication 20 MILLIGRAM(S): at 20:13

## 2022-12-18 RX ADMIN — Medication 3 UNIT(S): at 11:53

## 2022-12-18 RX ADMIN — HUMAN INSULIN 8 UNIT(S): 100 INJECTION, SUSPENSION SUBCUTANEOUS at 11:53

## 2022-12-18 RX ADMIN — Medication 4: at 08:40

## 2022-12-18 RX ADMIN — HEPARIN SODIUM 5000 UNIT(S): 5000 INJECTION INTRAVENOUS; SUBCUTANEOUS at 22:07

## 2022-12-18 NOTE — PROGRESS NOTE ADULT - SUBJECTIVE AND OBJECTIVE BOX
CTICU  CRITICAL  CARE  attending     Hand off received 					   Pertinent clinical, laboratory, radiographic, hemodynamic, echocardiographic, respiratory data, microbiologic data and chart were reviewed and analyzed frequently throughout the course of the day and night  Patient seen and examined with CTS/ SH attending at bedside    Pt is a 77y , Female, post op day # 3 s/p CABG x 4V; off pump;    post op:    Extubated   Hypotensive  Features of low cardiac output state  TTE: showing hyperdynamic LV and reduced RV fn with mild to moderate TR  Mild lactic acidosis  started on Inotrope support with dobutamine  acute post H'gic anemia  s/p 1 unit PRBC    today:    weaning dobutamine gtt; @ 1.5 mcgs  diuresed  Hypoxemia; late; O2 up to 6L;      Drips:    Dobutamine @1.5 mcgs      FAMILY HISTORY:  FH: myocardial infarction (Father)    PAST MEDICAL & SURGICAL HISTORY:  Hyperlipidemia      Diabetes mellitus      Lung cancer      Hypothyroidism      GERD (gastroesophageal reflux disease)      S/P partial lobectomy of lung  left 2012      S/P knee surgery  fractured right patella        Patient is a 77y old  Female who presents with a chief complaint of CAD (18 Dec 2022 09:52)      14 system review limited 2/2 post op morbidity    Vital signs, hemodynamic and respiratory parameters were reviewed from the bedside nursing flowsheet.  ICU Vital Signs Last 24 Hrs  T(C): 36.2 (19 Dec 2022 01:05), Max: 36.7 (18 Dec 2022 09:00)  T(F): 97.2 (19 Dec 2022 01:05), Max: 98.1 (18 Dec 2022 14:00)  HR: 89 (19 Dec 2022 00:00) (82 - 100)  BP: 160/80 (19 Dec 2022 00:00) (103/58 - 172/74)  BP(mean): 114 (19 Dec 2022 00:00) (77 - 114)  ABP: 120/56 (18 Dec 2022 04:00) (120/56 - 130/56)  ABP(mean): 80 (18 Dec 2022 04:00) (80 - 82)  RR: 18 (19 Dec 2022 00:00) (15 - 20)  SpO2: 89% (19 Dec 2022 00:00) (88% - 96%)    O2 Parameters below as of 19 Dec 2022 00:00  Patient On (Oxygen Delivery Method): nasal cannula w/ humidification  O2 Flow (L/min): 6        Adult Advanced Hemodynamics Last 24 Hrs  CVP(mm Hg): 8 (19 Dec 2022 00:00) (1 - 26)  CVP(cm H2O): --  CO: --  CI: --  PA: --  PA(mean): --  PCWP: --  SVR: --  SVRI: --  PVR: --  PVRI: --, ABG - ( 18 Dec 2022 00:08 )  pH, Arterial: 7.39  pH, Blood: x     /  pCO2: 33    /  pO2: 72    / HCO3: 20    / Base Excess: -4.1  /  SaO2: 94.5                Intake and output was reviewed and the fluid balance was calculated  Daily     Daily   I&O's Summary    17 Dec 2022 07:01  -  18 Dec 2022 07:00  --------------------------------------------------------  IN: 1542.5 mL / OUT: 2185 mL / NET: -642.5 mL    18 Dec 2022 07:01  -  19 Dec 2022 01:28  --------------------------------------------------------  IN: 552.3 mL / OUT: 1975 mL / NET: -1422.7 mL        All lines and drain sites were assessed  [facility]      POCT Blood Glucose.: 120 mg/dL (18 Dec 2022 21:35)    No acute change in mental status  Auscultation of the chest reveals equal bs  Abdomen is soft  Extremities are warm and well perfused  Wounds appear clean and unremarkable  Antibiotics are periop    labs  CBC Full  -  ( 18 Dec 2022 18:33 )  WBC Count : 11.64 K/uL  RBC Count : 3.44 M/uL  Hemoglobin : 9.9 g/dL  Hematocrit : 29.8 %  Platelet Count - Automated : 209 K/uL  Mean Cell Volume : 86.6 fl  Mean Cell Hemoglobin : 28.8 pg  Mean Cell Hemoglobin Concentration : 33.2 gm/dL  Auto Neutrophil # : x  Auto Lymphocyte # : x  Auto Monocyte # : x  Auto Eosinophil # : x  Auto Basophil # : x  Auto Neutrophil % : x  Auto Lymphocyte % : x  Auto Monocyte % : x  Auto Eosinophil % : x  Auto Basophil % : x    12-18    139  |  102  |  12  ----------------------------<  125<H>  3.5   |  25  |  0.60    Ca    8.8      18 Dec 2022 18:33  Phos  2.0     12-18  Mg     2.2     12-18    TPro  6.2  /  Alb  3.9  /  TBili  1.1  /  DBili  x   /  AST  20  /  ALT  9<L>  /  AlkPhos  97  12-18    PT/INR - ( 18 Dec 2022 18:33 )   PT: 12.3 sec;   INR: 1.03          PTT - ( 18 Dec 2022 18:33 )  PTT:26.8 sec  The current medications were reviewed   MEDICATIONS  (STANDING):  aspirin enteric coated 81 milliGRAM(s) Oral daily  chlorhexidine 2% Cloths 1 Application(s) Topical <User Schedule>  clopidogrel Tablet 75 milliGRAM(s) Oral daily  dexMEDEtomidine Infusion 0.3 MICROgram(s)/kG/Hr (3.95 mL/Hr) IV Continuous <Continuous>  dextrose 50% Injectable 25 Gram(s) IV Push once  dextrose 50% Injectable 12.5 Gram(s) IV Push once  DOBUTamine Infusion 2.5 MICROgram(s)/kG/Min (3.95 mL/Hr) IV Continuous <Continuous>  heparin   Injectable 5000 Unit(s) SubCutaneous every 8 hours  insulin glargine Injectable (LANTUS) 12 Unit(s) SubCutaneous at bedtime  insulin lispro (ADMELOG) corrective regimen sliding scale   SubCutaneous Before meals and at bedtime  insulin lispro Injectable (ADMELOG) 4 Unit(s) SubCutaneous three times a day before meals  levothyroxine 100 MICROGram(s) Oral daily  pantoprazole    Tablet 40 milliGRAM(s) Oral daily  PARoxetine 10 milliGRAM(s) Oral daily  polyethylene glycol 3350 17 Gram(s) Oral daily  senna 2 Tablet(s) Oral at bedtime  sodium chloride 0.9%. 1000 milliLiter(s) (10 mL/Hr) IV Continuous <Continuous>    MEDICATIONS  (PRN):  acetaminophen     Tablet .. 650 milliGRAM(s) Oral every 6 hours PRN Mild Pain (1 - 3)  oxyCODONE    IR 5 milliGRAM(s) Oral every 6 hours PRN Moderate Pain (4 - 6)  oxyCODONE    IR 10 milliGRAM(s) Oral every 6 hours PRN Severe Pain (7 - 10)       PROBLEM LIST/ ASSESSMENT:  HEALTH ISSUES - PROBLEM Dx:      ,   Patient is a 77y old  Female who presents with a chief complaint of CAD (18 Dec 2022 09:52)     s/p CABG x 3V      My plan includes :    maintain MAP >70  Titrate Inotrope support to maintain normal lactate and other metrics of end organ perfusion  Trend Lactate levels  Titrate supplemental O2 to maintain Sao2 >95%  Serial ABGs  Diurese for negative fluid balance    close hemodynamic, ventilatory and drain monitoring and management per post op routine    Monitor for arrhythmias and monitor parameters for organ perfusion  monitor neurologic status  Head of the bed should remain elevated to 45 deg .   chest PT and IS will be encouraged  monitor adequacy of oxygenation and ventilation and attempt to wean oxygen  Nutritional goals will be met using po eventually , ensure adequate caloric intake and montior the same  Stress ulcer and VTE prophylaxis will be achieved    Glycemic control is satisfactory  Electrolytes have been repleted as necessary and wound care has been carried out. Pain control has been achieved.   agressive physical therapy and early mobility and ambulation goals will be met   The family was updated about the course and plan  CRITICAL CARE TIME SPENT in evaluation and management, reassessments, review and interpretation of labs and x-rays, ventilator and hemodynamic management, formulating a plan and coordinating care: ___90____ MIN.  Time does not include procedural time.  CTICU ATTENDING     					    Duke Best MD

## 2022-12-18 NOTE — CONSULT NOTE ADULT - ATTENDING COMMENTS
Pt seen at beside with fellow.  Pt sitting in chair, no complaints.   Diabetes for 5 years but has not been on meds for past 2 years, A1c 9.2%.  She is s/p CABG, sugars ranging 144-246 in the past 24 hours.  will start basal/bolus insulin but if her insulin requirements are not high, she may be able to be discharged on oral/non insulin therapies and follow up with us outpatient.

## 2022-12-18 NOTE — CONSULT NOTE ADULT - SUBJECTIVE AND OBJECTIVE BOX
HISTORY OF PRESENT ILLNESS  Tangela Constantino is a 77-year-old female with a past medical history of type 2 diabetes mellitus, hypothyroidism, hyperlipidemia, coronary artery disease (s/p PCI on 2012) and left lung CA (s/p partial lobectomy) who had outpatient outpatient work-up for chest pain and dyspnea on exertion (s/p echocardiogram with normal LVEF and no valvular disease on 8/25/22; s/p CCTA with calcium score of 1383 on 12/2/22; s/p cardiac cath revealing severe 3 vessel CAD on 12/9/22) who was admitted on 12/14/22 for elective surgery s/p CABG on 12/15/22 (LIMA-LAD, SVG-Diag, SVG-OM-Diag seq). Post-operatively, she was started on an insulin drip, later discontinued and kept on an insulin sliding scale. Endocrinology was consulted for recommendations regarding glycemic control.     CAPILLARY BLOOD GLUCOSE & INSULIN RECEIVED  244 mg/dL (12-17 @ 11:45) -> 4 units of Lispro sliding scale.   144 mg/dL (12-17 @ 17:07)  169 mg/dL (12-17 @ 22:19)  246 mg/dL (12-18 @ 03:06)  239 mg/dL (12-18 @ 08:30) -> 4 units of Lispro sliding scale     DIABETES HISTORY  - Age at diagnosis:   - Symptoms at time of diagnosis:   - Current Therapy:  - History of other regimens:   - History of hypoglycemia:   - History of DKA/HHS:   - Complications:   - Home FSG:        > Fasting: *** mg/dL.        > Before meals: *** mg/dL.        > Bedtime: *** mg/dL.  - Diet:          > Breakfast:         > Lunch:        > Dinner:        > Snacks:  - Physical activity:    - Outpatient follow-up:     PAST MEDICAL & SURGICAL HISTORY  As per history of present illness.     FAMILY HISTORY  - Diabetes:  - Thyroid:  - Autoimmune:  - Other:    SOCIAL HISTORY  - Work:  - Alcohol:  - Smoking:  - Recreational Drugs:    ALLERGIES  Crestor (Unknown)  Lipitor (Unknown)  penicillin (Angioedema)  Zetia (Unknown)    CURRENT MEDICATIONS  acetaminophen     Tablet .. 650 milliGRAM(s) Oral every 6 hours PRN  aspirin enteric coated 81 milliGRAM(s) Oral daily  chlorhexidine 2% Cloths 1 Application(s) Topical <User Schedule>  clopidogrel Tablet 75 milliGRAM(s) Oral daily  dexMEDEtomidine Infusion 0.3 MICROgram(s)/kG/Hr IV Continuous <Continuous>  dextrose 50% Injectable 25 Gram(s) IV Push once  dextrose 50% Injectable 12.5 Gram(s) IV Push once  DOBUTamine Infusion 2.5 MICROgram(s)/kG/Min IV Continuous <Continuous>  heparin   Injectable 5000 Unit(s) SubCutaneous every 8 hours  insulin glargine Injectable (LANTUS) 8 Unit(s) SubCutaneous at bedtime  insulin lispro (ADMELOG) corrective regimen sliding scale   SubCutaneous Before meals and at bedtime  insulin lispro Injectable (ADMELOG) 3 Unit(s) SubCutaneous three times a day before meals  insulin NPH human recombinant 8 Unit(s) SubCutaneous once  levothyroxine 100 MICROGram(s) Oral daily  oxyCODONE    IR 5 milliGRAM(s) Oral every 6 hours PRN  oxyCODONE    IR 10 milliGRAM(s) Oral every 6 hours PRN  pantoprazole    Tablet 40 milliGRAM(s) Oral daily  PARoxetine 10 milliGRAM(s) Oral daily  polyethylene glycol 3350 17 Gram(s) Oral daily  senna 2 Tablet(s) Oral at bedtime  sodium chloride 0.9%. 1000 milliLiter(s) IV Continuous <Continuous>    REVIEW OF SYSTEMS  Constitutional:  Negative fever, chills or loss of appetite.  Eyes:  Negative blurry vision or double vision.  Cardiovascular:  Negative for chest pain or palpitations.  Respiratory:  Negative for cough, wheezing, or shortness of breath.   Gastrointestinal:  Negative for nausea, vomiting, diarrhea, constipation, or abdominal pain.  Genitourinary:  Negative frequency, urgency or dysuria.  Neurologic:  No headache, confusion, dizziness, lightheadedness.    PHYSICAL EXAM  Vital Signs Last 24 Hrs  T(C): 36.7 (18 Dec 2022 09:00), Max: 37.2 (18 Dec 2022 01:03)  T(F): 98 (18 Dec 2022 09:00), Max: 99 (18 Dec 2022 01:03)  HR: 86 (18 Dec 2022 09:00) (72 - 94)  BP: 103/58 (18 Dec 2022 09:00) (103/58 - 172/74)  BP(mean): 77 (18 Dec 2022 09:00) (77 - 111)  RR: 18 (18 Dec 2022 09:00) (13 - 20)  SpO2: 94% (18 Dec 2022 09:00) (92% - 97%)    Parameters below as of 18 Dec 2022 09:00  Patient On (Oxygen Delivery Method): nasal cannula w/ humidification  O2 Flow (L/min): 4    Constitutional: Awake, alert, in no acute distress.   HEENT: Normocephalic, atraumatic, JARETH, no proptosis or lid retraction.   Neck: supple, no acanthosis, no thyromegaly or palpable thyroid nodules.  Respiratory: Lungs clear to ausculation bilaterally.   Cardiovascular: regular rhythm, normal S1 and S2, no audible murmurs.   GI: soft, non-tender, non-distended, bowel sounds present, no masses appreciated.  Extremities: No lower extremity edema, peripheral pulses present.   Skin: no rashes.   Psychiatric: AAO x 3. Normal affect/mood.     LABS  CBC - WBC/HGB/HTC/PLT: 11.24/9.9/30.0/181 (12-18-22)  BMP: Na/K/Cl/Bicarb/BUN/Cr/Gluc: 133/3.5/97/21/10/0.60/246 (12-18-22)  Anion Gap: 15 (12-18-22)  eGFR: 91 (12-18-22)  Calcium: 8.7 (12-18-22)  Phosphorus: 2.7 (12-18-22)  Magnesium: 1.7 (12-18-22)  LFT - Alb/Tprot/Tbili/Dbili/AlkPhos/ALT/AST: 3.9/--/1.1/--/84/8/21 (12-18-22)  PT/aPTT/INR: 13.5/33.3/1.13 (12-18-22)  Thyroid Stimulating Hormone, Serum: 2.260 (12-09-22)    ASSESSMENT / RECOMMENDATIONS  77-year-old female with a past medical history of type 2 diabetes mellitus, hypothyroidism, hyperlipidemia, coronary artery disease (s/p PCI on 2012) and left lung CA (s/p partial lobectomy) who was admitted on 12/14/22 for elective surgery s/p CABG on 12/15/22 (LIMA-LAD, SVG-Diag, SVG-OM-Diag seq). Endocrinology was consulted for recommendations regarding glycemic control.     A1C: 9.2 %  BUN: 10  Creatinine: 0.60  GFR: 91  Weight: 52.6  BMI: 21.9  EF: >75%    # Type 2 diabetes mellitus  - Please continue lantus *** units at bedtime.   - Continue lispro *** units before each meal.  - Continue lispro moderate / low dose sliding scale before meals and at bedtime.  - Patient's fingerstick glucose goal is 100-180 mg/dL.    - For discharge, patient can ***.    - Patient can follow up at discharge with [facility] by calling [phone] to make an appointment.      Case discussed with Dr. Jolly. Primary team updated.       Flo Lemon    Endocrinology Fellow    Service Pager: [phone]  HISTORY OF PRESENT ILLNESS  Tangela Constantino is a 77-year-old female with a past medical history of type 2 diabetes mellitus, hypothyroidism, hyperlipidemia, coronary artery disease (s/p PCI on 2012) and left lung CA (s/p partial lobectomy) who had outpatient outpatient work-up for chest pain and dyspnea on exertion (s/p echocardiogram with normal LVEF and no valvular disease on 8/25/22; s/p CCTA with calcium score of 1383 on 12/2/22; s/p cardiac cath revealing severe 3 vessel CAD on 12/9/22) who was admitted on 12/14/22 for elective surgery s/p CABG on 12/15/22 (LIMA-LAD, SVG-Diag, SVG-OM-Diag seq). Post-operatively, she was started on an insulin drip, later discontinued and kept on an insulin sliding scale. Endocrinology was consulted for recommendations regarding glycemic control.     CAPILLARY BLOOD GLUCOSE & INSULIN RECEIVED  244 mg/dL (12-17 @ 11:45) -> 4 units of Lispro sliding scale.   144 mg/dL (12-17 @ 17:07)  169 mg/dL (12-17 @ 22:19)  246 mg/dL (12-18 @ 03:06)  239 mg/dL (12-18 @ 08:30) -> 4 units of Lispro sliding scale     DIABETES HISTORY  - Age at diagnosis: She was diagnosed ~5 years ago when she was experiencing dizziness when eating things with sugar.   - Current Therapy: No medications for diabetes.   - History of other regimens: She was evaluated by an endocrinologist (Dr. Maegan Jenkins) who started her on Metformin and Januvia. She continued on that regimen for approximately one year. However, she was later told that the numbers kept going up and that she might need to be on insulin. Patient says that her medications were stopped? and she didn't continue following with Dr. Jenkins. She continued without taking any medications until ~3 weeks ago when she was evaluated by an Endocrinologist at [facility] (Dr. Nasir Toledo) who started her on Metformin and Glymepiride. Nonetheless, she says that due to her recent cardiology work-up, she didn't have time to start taking the medications.   - History of hypoglycemia: Denied.   - History of DKA/HHS: Denied.   - Complications: Reports experiencing occasional tingling sensation in feet. She saw an ophthalmologist ~1 year ago who told her that her eyes were within normal limits.   - Home FSG: Checks every day, either in the morning before breakfast or at bedtime.         > Fasting: ~230 mg/dL.        > Bedtime: ~270 mg/dL.  - Diet:          > Breakfast: English muffin + smoked salmon + black coffee.         > Lunch: Cottage cheese or greek cheese + apple         > Dinner: Candy bar ([employer]terfing[employer]) + Orange OR salmon/steak +/- baked potato.         > Snacks: Apple. She mentioned eating ice cream occasionally.     PAST MEDICAL & SURGICAL HISTORY  As per history of present illness.     FAMILY HISTORY  - Diabetes: Mother, cousins.   - Thyroid: Denied.   - Autoimmune: Denied.  - Other: Mother had myocardial infarction.     SOCIAL HISTORY  - Work:   - Alcohol: Denied.   - Smoking: Started when she was 13 y/o until she was 37 y/o. Used to smoke 2 ppd.   - Recreational Drugs: Denied.     ALLERGIES  Crestor (Unknown)  Lipitor (Unknown)  penicillin (Angioedema)  Zetia (Unknown)    CURRENT MEDICATIONS  acetaminophen     Tablet .. 650 milliGRAM(s) Oral every 6 hours PRN  aspirin enteric coated 81 milliGRAM(s) Oral daily  chlorhexidine 2% Cloths 1 Application(s) Topical <User Schedule>  clopidogrel Tablet 75 milliGRAM(s) Oral daily  dexMEDEtomidine Infusion 0.3 MICROgram(s)/kG/Hr IV Continuous <Continuous>  dextrose 50% Injectable 25 Gram(s) IV Push once  dextrose 50% Injectable 12.5 Gram(s) IV Push once  DOBUTamine Infusion 2.5 MICROgram(s)/kG/Min IV Continuous <Continuous>  heparin   Injectable 5000 Unit(s) SubCutaneous every 8 hours  insulin glargine Injectable (LANTUS) 8 Unit(s) SubCutaneous at bedtime  insulin lispro (ADMELOG) corrective regimen sliding scale   SubCutaneous Before meals and at bedtime  insulin lispro Injectable (ADMELOG) 3 Unit(s) SubCutaneous three times a day before meals  insulin NPH human recombinant 8 Unit(s) SubCutaneous once  levothyroxine 100 MICROGram(s) Oral daily  oxyCODONE    IR 5 milliGRAM(s) Oral every 6 hours PRN  oxyCODONE    IR 10 milliGRAM(s) Oral every 6 hours PRN  pantoprazole    Tablet 40 milliGRAM(s) Oral daily  PARoxetine 10 milliGRAM(s) Oral daily  polyethylene glycol 3350 17 Gram(s) Oral daily  senna 2 Tablet(s) Oral at bedtime  sodium chloride 0.9%. 1000 milliLiter(s) IV Continuous <Continuous>    REVIEW OF SYSTEMS  Constitutional:  Negative fever, chills or loss of appetite.  Eyes:  Negative blurry vision or double vision.  Cardiovascular:  Negative for chest pain or palpitations.  Respiratory:  Negative for cough, wheezing, or shortness of breath.   Gastrointestinal:  Negative for nausea, vomiting, diarrhea, constipation, or abdominal pain.  Genitourinary:  Negative frequency, urgency or dysuria.  Neurologic:  No headache, confusion, dizziness, lightheadedness.    PHYSICAL EXAM  Vital Signs Last 24 Hrs  T(C): 36.7 (18 Dec 2022 09:00), Max: 37.2 (18 Dec 2022 01:03)  T(F): 98 (18 Dec 2022 09:00), Max: 99 (18 Dec 2022 01:03)  HR: 86 (18 Dec 2022 09:00) (72 - 94)  BP: 103/58 (18 Dec 2022 09:00) (103/58 - 172/74)  BP(mean): 77 (18 Dec 2022 09:00) (77 - 111)  RR: 18 (18 Dec 2022 09:00) (13 - 20)  SpO2: 94% (18 Dec 2022 09:00) (92% - 97%)    Parameters below as of 18 Dec 2022 09:00  Patient On (Oxygen Delivery Method): nasal cannula w/ humidification  O2 Flow (L/min): 4    Constitutional: Awake, alert, elderly female, in no acute distress.   HEENT: Normocephalic, atraumatic, JARETH.  Respiratory: Lungs clear to ausculation bilaterally.   Cardiovascular: regular rhythm, normal S1 and S2, no audible murmurs.   GI: soft, non-tender, non-distended, bowel sounds present.  Extremities: No lower extremity edema.  Psychiatric: AAO x 3. Normal affect/mood.     LABS  CBC - WBC/HGB/HTC/PLT: 11.24/9.9/30.0/181 (12-18-22)  BMP: Na/K/Cl/Bicarb/BUN/Cr/Gluc: 133/3.5/97/21/10/0.60/246 (12-18-22)  Anion Gap: 15 (12-18-22)  eGFR: 91 (12-18-22)  Calcium: 8.7 (12-18-22)  Phosphorus: 2.7 (12-18-22)  Magnesium: 1.7 (12-18-22)  LFT - Alb/Tprot/Tbili/Dbili/AlkPhos/ALT/AST: 3.9/--/1.1/--/84/8/21 (12-18-22)  PT/aPTT/INR: 13.5/33.3/1.13 (12-18-22)  Thyroid Stimulating Hormone, Serum: 2.260 (12-09-22)    ASSESSMENT / RECOMMENDATIONS  77-year-old female with a past medical history of type 2 diabetes mellitus, hypothyroidism, hyperlipidemia, coronary artery disease (s/p PCI on 2012) and left lung CA (s/p partial lobectomy) who was admitted on 12/14/22 for elective surgery s/p CABG on 12/15/22 (LIMA-LAD, SVG-Diag, SVG-OM-Diag seq). Endocrinology was consulted for recommendations regarding glycemic control.     A1C: 9.2 %  BUN: 10  Creatinine: 0.60  GFR: 91  Weight: 52.6  BMI: 21.9  EF: >75%    # Type 2 diabetes mellitus  - Please increase lantus to 12 units at bedtime.   - Increase lispro to 4 units before each meal.  - Continue lispro moderate dose sliding scale before meals and at bedtime.  - Patient's fingerstick glucose goal is 100-180 mg/dL.    - Patient can follow up at discharge with [facility] by calling [phone] to make an appointment.      Case discussed with Dr. Jolly. Primary team updated.       Flo Lemon    Endocrinology Fellow    Service Pager: [phone]

## 2022-12-19 LAB
ALBUMIN SERPL ELPH-MCNC: 3.8 G/DL — SIGNIFICANT CHANGE UP (ref 3.3–5)
ALP SERPL-CCNC: 109 U/L — SIGNIFICANT CHANGE UP (ref 40–120)
ALT FLD-CCNC: 8 U/L — LOW (ref 10–45)
ANION GAP SERPL CALC-SCNC: 11 MMOL/L — SIGNIFICANT CHANGE UP (ref 5–17)
APTT BLD: 24.1 SEC — LOW (ref 27.5–35.5)
AST SERPL-CCNC: 19 U/L — SIGNIFICANT CHANGE UP (ref 10–40)
BILIRUB SERPL-MCNC: 1.1 MG/DL — SIGNIFICANT CHANGE UP (ref 0.2–1.2)
BUN SERPL-MCNC: 11 MG/DL — SIGNIFICANT CHANGE UP (ref 7–23)
CALCIUM SERPL-MCNC: 8.8 MG/DL — SIGNIFICANT CHANGE UP (ref 8.4–10.5)
CHLORIDE SERPL-SCNC: 103 MMOL/L — SIGNIFICANT CHANGE UP (ref 96–108)
CO2 SERPL-SCNC: 27 MMOL/L — SIGNIFICANT CHANGE UP (ref 22–31)
CREAT SERPL-MCNC: 0.58 MG/DL — SIGNIFICANT CHANGE UP (ref 0.5–1.3)
EGFR: 92 ML/MIN/1.73M2 — SIGNIFICANT CHANGE UP
GLUCOSE BLDC GLUCOMTR-MCNC: 106 MG/DL — HIGH (ref 70–99)
GLUCOSE BLDC GLUCOMTR-MCNC: 129 MG/DL — HIGH (ref 70–99)
GLUCOSE BLDC GLUCOMTR-MCNC: 131 MG/DL — HIGH (ref 70–99)
GLUCOSE BLDC GLUCOMTR-MCNC: 132 MG/DL — HIGH (ref 70–99)
GLUCOSE SERPL-MCNC: 126 MG/DL — HIGH (ref 70–99)
HCT VFR BLD CALC: 30.4 % — LOW (ref 34.5–45)
HGB BLD-MCNC: 10.2 G/DL — LOW (ref 11.5–15.5)
INR BLD: 1.07 — SIGNIFICANT CHANGE UP (ref 0.88–1.16)
MAGNESIUM SERPL-MCNC: 2 MG/DL — SIGNIFICANT CHANGE UP (ref 1.6–2.6)
MCHC RBC-ENTMCNC: 29.2 PG — SIGNIFICANT CHANGE UP (ref 27–34)
MCHC RBC-ENTMCNC: 33.6 GM/DL — SIGNIFICANT CHANGE UP (ref 32–36)
MCV RBC AUTO: 87.1 FL — SIGNIFICANT CHANGE UP (ref 80–100)
NRBC # BLD: 0 /100 WBCS — SIGNIFICANT CHANGE UP (ref 0–0)
PHOSPHATE SERPL-MCNC: 3.3 MG/DL — SIGNIFICANT CHANGE UP (ref 2.5–4.5)
PLATELET # BLD AUTO: 231 K/UL — SIGNIFICANT CHANGE UP (ref 150–400)
POTASSIUM SERPL-MCNC: 4.4 MMOL/L — SIGNIFICANT CHANGE UP (ref 3.5–5.3)
POTASSIUM SERPL-SCNC: 4.4 MMOL/L — SIGNIFICANT CHANGE UP (ref 3.5–5.3)
PROT SERPL-MCNC: 6.1 G/DL — SIGNIFICANT CHANGE UP (ref 6–8.3)
PROTHROM AB SERPL-ACNC: 12.8 SEC — SIGNIFICANT CHANGE UP (ref 10.5–13.4)
RBC # BLD: 3.49 M/UL — LOW (ref 3.8–5.2)
RBC # FLD: 14 % — SIGNIFICANT CHANGE UP (ref 10.3–14.5)
SODIUM SERPL-SCNC: 141 MMOL/L — SIGNIFICANT CHANGE UP (ref 135–145)
WBC # BLD: 10.62 K/UL — HIGH (ref 3.8–10.5)
WBC # FLD AUTO: 10.62 K/UL — HIGH (ref 3.8–10.5)

## 2022-12-19 PROCEDURE — 71045 X-RAY EXAM CHEST 1 VIEW: CPT | Mod: 26

## 2022-12-19 PROCEDURE — 99233 SBSQ HOSP IP/OBS HIGH 50: CPT

## 2022-12-19 RX ORDER — SODIUM CHLORIDE 9 MG/ML
3 INJECTION INTRAMUSCULAR; INTRAVENOUS; SUBCUTANEOUS EVERY 8 HOURS
Refills: 0 | Status: DISCONTINUED | OUTPATIENT
Start: 2022-12-19 | End: 2022-12-21

## 2022-12-19 RX ORDER — FUROSEMIDE 40 MG
40 TABLET ORAL DAILY
Refills: 0 | Status: DISCONTINUED | OUTPATIENT
Start: 2022-12-19 | End: 2022-12-21

## 2022-12-19 RX ORDER — METOPROLOL TARTRATE 50 MG
25 TABLET ORAL
Refills: 0 | Status: DISCONTINUED | OUTPATIENT
Start: 2022-12-19 | End: 2022-12-20

## 2022-12-19 RX ORDER — BUDESONIDE, MICRONIZED 100 %
0.5 POWDER (GRAM) MISCELLANEOUS EVERY 12 HOURS
Refills: 0 | Status: DISCONTINUED | OUTPATIENT
Start: 2022-12-19 | End: 2022-12-21

## 2022-12-19 RX ORDER — POTASSIUM CHLORIDE 20 MEQ
20 PACKET (EA) ORAL DAILY
Refills: 0 | Status: DISCONTINUED | OUTPATIENT
Start: 2022-12-19 | End: 2022-12-21

## 2022-12-19 RX ADMIN — Medication 25 MILLIGRAM(S): at 05:43

## 2022-12-19 RX ADMIN — Medication 20 MILLIEQUIVALENT(S): at 14:57

## 2022-12-19 RX ADMIN — CLOPIDOGREL BISULFATE 75 MILLIGRAM(S): 75 TABLET, FILM COATED ORAL at 11:55

## 2022-12-19 RX ADMIN — SODIUM CHLORIDE 3 MILLILITER(S): 9 INJECTION INTRAMUSCULAR; INTRAVENOUS; SUBCUTANEOUS at 14:33

## 2022-12-19 RX ADMIN — Medication 25 MILLIGRAM(S): at 17:26

## 2022-12-19 RX ADMIN — HEPARIN SODIUM 5000 UNIT(S): 5000 INJECTION INTRAVENOUS; SUBCUTANEOUS at 14:57

## 2022-12-19 RX ADMIN — Medication 10 MILLIGRAM(S): at 11:54

## 2022-12-19 RX ADMIN — Medication 0.5 MILLIGRAM(S): at 17:26

## 2022-12-19 RX ADMIN — CHLORHEXIDINE GLUCONATE 1 APPLICATION(S): 213 SOLUTION TOPICAL at 05:39

## 2022-12-19 RX ADMIN — Medication 0.5 MILLIGRAM(S): at 09:56

## 2022-12-19 RX ADMIN — Medication 4 UNIT(S): at 11:57

## 2022-12-19 RX ADMIN — HEPARIN SODIUM 5000 UNIT(S): 5000 INJECTION INTRAVENOUS; SUBCUTANEOUS at 07:15

## 2022-12-19 RX ADMIN — Medication 4 UNIT(S): at 07:14

## 2022-12-19 RX ADMIN — Medication 81 MILLIGRAM(S): at 11:55

## 2022-12-19 RX ADMIN — SENNA PLUS 2 TABLET(S): 8.6 TABLET ORAL at 22:41

## 2022-12-19 RX ADMIN — Medication 650 MILLIGRAM(S): at 22:46

## 2022-12-19 RX ADMIN — PANTOPRAZOLE SODIUM 40 MILLIGRAM(S): 20 TABLET, DELAYED RELEASE ORAL at 11:55

## 2022-12-19 RX ADMIN — Medication 4 UNIT(S): at 16:38

## 2022-12-19 RX ADMIN — HEPARIN SODIUM 5000 UNIT(S): 5000 INJECTION INTRAVENOUS; SUBCUTANEOUS at 22:42

## 2022-12-19 RX ADMIN — POLYETHYLENE GLYCOL 3350 17 GRAM(S): 17 POWDER, FOR SOLUTION ORAL at 11:56

## 2022-12-19 RX ADMIN — Medication 40 MILLIGRAM(S): at 14:57

## 2022-12-19 RX ADMIN — SODIUM CHLORIDE 3 MILLILITER(S): 9 INJECTION INTRAMUSCULAR; INTRAVENOUS; SUBCUTANEOUS at 23:08

## 2022-12-19 RX ADMIN — Medication 100 MICROGRAM(S): at 05:43

## 2022-12-19 RX ADMIN — INSULIN GLARGINE 12 UNIT(S): 100 INJECTION, SOLUTION SUBCUTANEOUS at 22:41

## 2022-12-19 NOTE — PROGRESS NOTE ADULT - ASSESSMENT
78 yo female, former heavy smoker presents with a PMH of HLD, CAD (s/p PTCA in 2012), DM-II, left Lung CA s/p partial lobectomy and Hypothyroidism with class III angina with severe 3 vessel CAD now s/p uncomplicated OPCABx4 with normal EF on 12/15. Patient arrived to the ICU intubated on no gtts. Overnight on POD#0 patient developed a lactic acidosis and low urine output; thought to be in a low flow state. She was started on dobutamine for worsening RV function.  Over POD#1-3 patient was slowly weaned off of the dobutamine. POD#4 patient was started on Lasix and transferred to [facility].       Neuro: pain management, AoX3 but impulsive. history of depression   -  Will require 1:1 observation overnight.   - Oxy PRN   - continue paxil 10mg Daily     CVS:   - Continue ASA and Plavix, Statin for CAD   - continue Lopressor 25  BID for afib ppx; Titrate up as tolerated.      Respiratory: Saturating well on RA   - Wean off O2 sat > 92%  - IS and ambulation  - Chest PT.   - continue Budesonide.    GI: passing flatus   - GI PPX   - Bowel regimen with Senna and Miralax     : BUN/ Creatinine 11/0.53 -Heredia; received IV Lasix today.   - Lasix 40 PO Daily.   - monitor I/Os.     Endo: A1C 9.0 FS well controlled.   - Endocrine following.   - 4/4/4/12  - Synthroid 100 mcg Daily     ID:   - completed periop ABX   - continue to monitor fever curve     Heme: DVT PPX   - H     Dispo plan: Home tomorrow.     Sandra Coates PA-C

## 2022-12-19 NOTE — PROGRESS NOTE ADULT - SUBJECTIVE AND OBJECTIVE BOX
Patient discussed on morning rounds with Dr. Garces     Operation / Date: 12/15 OPCAB x4 LIMA- LAD, SVG -OM, Seq, SVG -Diag EF normal     SUBJECTIVE ASSESSMENT:  77y Female reports that she is not confused and does not need a . No other complaints at this time.         Vital Signs Last 24 Hrs  T(C): 36.5 (19 Dec 2022 17:02), Max: 36.8 (19 Dec 2022 13:48)  T(F): 97.7 (19 Dec 2022 17:02), Max: 98.3 (19 Dec 2022 13:48)  HR: 79 (19 Dec 2022 16:57) (70 - 97)  BP: 147/74 (19 Dec 2022 16:57) (120/61 - 195/104)  BP(mean): 103 (19 Dec 2022 16:57) (82 - 141)  RR: 20 (19 Dec 2022 16:57) (18 - 20)  SpO2: 95% (19 Dec 2022 16:57) (88% - 95%)    Parameters below as of 19 Dec 2022 16:57  Patient On (Oxygen Delivery Method): nasal cannula w/ humidification  O2 Flow (L/min): 6    I&O's Detail    18 Dec 2022 07:01  -  19 Dec 2022 07:00  --------------------------------------------------------  IN:    DOBUTamine: 4.8 mL    IV PiggyBack: 200 mL    IV PiggyBack: 250 mL    sodium chloride 0.9%: 210 mL  Total IN: 664.8 mL    OUT:    Indwelling Catheter - Urethral (mL): 425 mL    Voided (mL): 2000 mL  Total OUT: 2425 mL    Total NET: -1760.2 mL      19 Dec 2022 07:01  -  19 Dec 2022 17:26  --------------------------------------------------------  IN:    Oral Fluid: 100 mL  Total IN: 100 mL    OUT:    Voided (mL): 400 mL  Total OUT: 400 mL    Total NET: -300 mL          CHEST TUBE:  Yes/No. AIR LEAKS: Yes/No. Suction / H2O SEAL.   SHONNA DRAIN:  Yes/No.  EPICARDIAL WIRES: Yes/No.  TIE DOWNS: No.  MANZO: No.    PHYSICAL EXAM:    General:     Neurological:    Cardiovascular:    Respiratory:    Gastrointestinal:    Extremities:    Vascular:    Incision Sites:    LABS:                        10.2   10.62 )-----------( 231      ( 19 Dec 2022 02:15 )             30.4       COUMADIN:  Yes/No. REASON: .    PT/INR - ( 19 Dec 2022 02:15 )   PT: 12.8 sec;   INR: 1.07          PTT - ( 19 Dec 2022 02:15 )  PTT:24.1 sec    12-19    141  |  103  |  11  ----------------------------<  126<H>  4.4   |  27  |  0.58    Ca    8.8      19 Dec 2022 02:15  Phos  3.3     12-19  Mg     2.0     12-19    TPro  6.1  /  Alb  3.8  /  TBili  1.1  /  DBili  x   /  AST  19  /  ALT  8<L>  /  AlkPhos  109  12-19          MEDICATIONS  (STANDING):  aspirin enteric coated 81 milliGRAM(s) Oral daily  buDESOnide    Inhalation Suspension 0.5 milliGRAM(s) Inhalation every 12 hours  clopidogrel Tablet 75 milliGRAM(s) Oral daily  dextrose 50% Injectable 25 Gram(s) IV Push once  dextrose 50% Injectable 12.5 Gram(s) IV Push once  furosemide    Tablet 40 milliGRAM(s) Oral daily  heparin   Injectable 5000 Unit(s) SubCutaneous every 8 hours  insulin glargine Injectable (LANTUS) 12 Unit(s) SubCutaneous at bedtime  insulin lispro (ADMELOG) corrective regimen sliding scale   SubCutaneous Before meals and at bedtime  insulin lispro Injectable (ADMELOG) 4 Unit(s) SubCutaneous three times a day before meals  levothyroxine 100 MICROGram(s) Oral daily  metoprolol tartrate 25 milliGRAM(s) Oral <User Schedule>  pantoprazole    Tablet 40 milliGRAM(s) Oral daily  PARoxetine 10 milliGRAM(s) Oral daily  polyethylene glycol 3350 17 Gram(s) Oral daily  potassium chloride    Tablet ER 20 milliEquivalent(s) Oral daily  senna 2 Tablet(s) Oral at bedtime  sodium chloride 0.9% lock flush 3 milliLiter(s) IV Push every 8 hours    MEDICATIONS  (PRN):  acetaminophen     Tablet .. 650 milliGRAM(s) Oral every 6 hours PRN Mild Pain (1 - 3)  oxyCODONE    IR 5 milliGRAM(s) Oral every 6 hours PRN Moderate Pain (4 - 6)  oxyCODONE    IR 10 milliGRAM(s) Oral every 6 hours PRN Severe Pain (7 - 10)        RADIOLOGY & ADDITIONAL TESTS:     Patient discussed on morning rounds with Dr. Garces     Operation / Date: 12/15 OPCAB x4 LIMA- LAD, SVG -OM, Seq, SVG -Diag EF normal     SUBJECTIVE ASSESSMENT:  77y Female reports that she is not confused and does not need a . No other complaints at this time.         Vital Signs Last 24 Hrs  T(C): 36.5 (19 Dec 2022 17:02), Max: 36.8 (19 Dec 2022 13:48)  T(F): 97.7 (19 Dec 2022 17:02), Max: 98.3 (19 Dec 2022 13:48)  HR: 79 (19 Dec 2022 16:57) (70 - 97)  BP: 147/74 (19 Dec 2022 16:57) (120/61 - 195/104)  BP(mean): 103 (19 Dec 2022 16:57) (82 - 141)  RR: 20 (19 Dec 2022 16:57) (18 - 20)  SpO2: 95% (19 Dec 2022 16:57) (88% - 95%)    Parameters below as of 19 Dec 2022 16:57  Patient On (Oxygen Delivery Method): nasal cannula w/ humidification  O2 Flow (L/min): 6    I&O's Detail    18 Dec 2022 07:01  -  19 Dec 2022 07:00  --------------------------------------------------------  IN:    DOBUTamine: 4.8 mL    IV PiggyBack: 200 mL    IV PiggyBack: 250 mL    sodium chloride 0.9%: 210 mL  Total IN: 664.8 mL    OUT:    Indwelling Catheter - Urethral (mL): 425 mL    Voided (mL): 2000 mL  Total OUT: 2425 mL    Total NET: -1760.2 mL      19 Dec 2022 07:01  -  19 Dec 2022 17:26  --------------------------------------------------------  IN:    Oral Fluid: 100 mL  Total IN: 100 mL    OUT:    Voided (mL): 400 mL  Total OUT: 400 mL    Total NET: -300 mL          CHEST TUBE:  No.   SHONNA DRAIN:  yes  EPICARDIAL WIRES: Yes  TIE DOWNS: No.  MANZO: No.    PHYSICAL EXAM:    GEN: NAD, Slightly disheveled.   Neuro: A&Ox3.  No focal deficits.  Moving all extremities.   HEENT: No obvious abnormalities  CV: S1S2, regular, no murmurs appreciated.  No carotid bruits.  No JVD  Lungs:  Bibasilar crackles. No wheeze or rhonchi.   ABD: Soft, non-tender, non-distended.  +Bowel sounds  EXT: Warm and well perfused.  Trace bilateral lower extremity edema.   Musculoskeletal: Moving all extremities with normal ROM, no joint swelling  PV: Pedal pulses palpable      LABS:                        10.2   10.62 )-----------( 231      ( 19 Dec 2022 02:15 )             30.4       COUMADIN:  No. REASON: .    PT/INR - ( 19 Dec 2022 02:15 )   PT: 12.8 sec;   INR: 1.07          PTT - ( 19 Dec 2022 02:15 )  PTT:24.1 sec    12-19    141  |  103  |  11  ----------------------------<  126<H>  4.4   |  27  |  0.58    Ca    8.8      19 Dec 2022 02:15  Phos  3.3     12-19  Mg     2.0     12-19    TPro  6.1  /  Alb  3.8  /  TBili  1.1  /  DBili  x   /  AST  19  /  ALT  8<L>  /  AlkPhos  109  12-19          MEDICATIONS  (STANDING):  aspirin enteric coated 81 milliGRAM(s) Oral daily  buDESOnide    Inhalation Suspension 0.5 milliGRAM(s) Inhalation every 12 hours  clopidogrel Tablet 75 milliGRAM(s) Oral daily  dextrose 50% Injectable 25 Gram(s) IV Push once  dextrose 50% Injectable 12.5 Gram(s) IV Push once  furosemide    Tablet 40 milliGRAM(s) Oral daily  heparin   Injectable 5000 Unit(s) SubCutaneous every 8 hours  insulin glargine Injectable (LANTUS) 12 Unit(s) SubCutaneous at bedtime  insulin lispro (ADMELOG) corrective regimen sliding scale   SubCutaneous Before meals and at bedtime  insulin lispro Injectable (ADMELOG) 4 Unit(s) SubCutaneous three times a day before meals  levothyroxine 100 MICROGram(s) Oral daily  metoprolol tartrate 25 milliGRAM(s) Oral <User Schedule>  pantoprazole    Tablet 40 milliGRAM(s) Oral daily  PARoxetine 10 milliGRAM(s) Oral daily  polyethylene glycol 3350 17 Gram(s) Oral daily  potassium chloride    Tablet ER 20 milliEquivalent(s) Oral daily  senna 2 Tablet(s) Oral at bedtime  sodium chloride 0.9% lock flush 3 milliLiter(s) IV Push every 8 hours    MEDICATIONS  (PRN):  acetaminophen     Tablet .. 650 milliGRAM(s) Oral every 6 hours PRN Mild Pain (1 - 3)  oxyCODONE    IR 5 milliGRAM(s) Oral every 6 hours PRN Moderate Pain (4 - 6)  oxyCODONE    IR 10 milliGRAM(s) Oral every 6 hours PRN Severe Pain (7 - 10)        RADIOLOGY & ADDITIONAL TESTS:  < from: Xray Chest 1 View- PORTABLE-Routine (Xray Chest 1 View- PORTABLE-Routine in AM.) (12.18.22 @ 05:46) >    INTERPRETATION:  Clinical History: Postop    Frontal examination of the chest demonstrates patient is status post   sternotomy. Mild cardiomegaly. No interval change this remaining support   devices in comparison to prior examination of the chest 12/17/2022.   Improvement congestion and/or infiltrates. Improvement left effusion.    IMPRESSION: Improvement congestion and/or infiltrates. Left effusion    --- End of Report ---    < end of copied text >

## 2022-12-19 NOTE — PROGRESS NOTE ADULT - SUBJECTIVE AND OBJECTIVE BOX
CTICU  CRITICAL  CARE  attending     Hand off received 					   Pertinent clinical, laboratory, radiographic, hemodynamic, echocardiographic, respiratory data, microbiologic data and chart were reviewed and analyzed frequently throughout the course of the day  Patient seen and examined with CTS/ SH attending at bedside  Pt is a 77yr old female with PMH HLD, CAD, DM, left lung ca sp partial lobectomy, hypothyroidism, admitted for surgical mgmt of 3V CAD. Underwent OpCABG x 4 (LIMA-LAD, SVG-Diag, SVG-OM-Diag sequential, nl EF) with Dr. Garces 12/15. Extubated early AM today. Started on dobutamine for RV dysfunction on TTE. Given 1 unit pRBC. Weaned dobutamine overnight. Diuresed.     FAMILY HISTORY:  FH: myocardial infarction (Father)  PAST MEDICAL & SURGICAL HISTORY:  Hyperlipidemia  Diabetes mellitus  Lung cancer  Hypothyroidism  GERD (gastroesophageal reflux disease)  S/P partial lobectomy of lung  left 2012  S/P knee surgery  fractured right patella        Patient is a 77y old  Female who presents with a chief complaint of CAD.      14 system review was unremarkable    Vital signs, hemodynamic and respiratory parameters were reviewed from the bedside nursing flowsheet.  ICU Vital Signs Last 24 Hrs  T(C): 36.1 (19 Dec 2022 04:52), Max: 36.7 (18 Dec 2022 09:00)  T(F): 97 (19 Dec 2022 04:52), Max: 98.1 (18 Dec 2022 14:00)  HR: 70 (19 Dec 2022 07:00) (70 - 100)  BP: 148/71 (19 Dec 2022 07:00) (103/58 - 195/104)  BP(mean): 94 (19 Dec 2022 07:00) (77 - 141)  ABP: --  ABP(mean): --  RR: 18 (19 Dec 2022 07:00) (16 - 18)  SpO2: 92% (19 Dec 2022 07:00) (88% - 95%)    O2 Parameters below as of 19 Dec 2022 07:00  Patient On (Oxygen Delivery Method): nasal cannula w/ humidification  O2 Flow (L/min): 6        Adult Advanced Hemodynamics Last 24 Hrs  CVP(mm Hg): 9 (19 Dec 2022 07:00) (1 - 26)  CVP(cm H2O): --  CO: --  CI: --  PA: --  PA(mean): --  PCWP: --  SVR: --  SVRI: --  PVR: --  PVRI: --, ABG - ( 18 Dec 2022 00:08 )  pH, Arterial: 7.39  pH, Blood: x     /  pCO2: 33    /  pO2: 72    / HCO3: 20    / Base Excess: -4.1  /  SaO2: 94.5                Intake and output was reviewed and the fluid balance was calculated  Daily     Daily   I&O's Summary    18 Dec 2022 07:01  -  19 Dec 2022 07:00  --------------------------------------------------------  IN: 664.8 mL / OUT: 2175 mL / NET: -1510.2 mL        All lines and drain sites were assessed  Glycemic trend was reviewed    POCT Blood Glucose.: 132 mg/dL (19 Dec 2022 06:18)      Neuro: nad  HEENT: mmm  Heart: s1 s2  Lungs: clear bl   Abdomen: soft nt nd  Extremities: 2+dp    Lines:  RIJ TLC 12/15        labs  CBC Full  -  ( 19 Dec 2022 02:15 )  WBC Count : 10.62 K/uL  RBC Count : 3.49 M/uL  Hemoglobin : 10.2 g/dL  Hematocrit : 30.4 %  Platelet Count - Automated : 231 K/uL  Mean Cell Volume : 87.1 fl  Mean Cell Hemoglobin : 29.2 pg  Mean Cell Hemoglobin Concentration : 33.6 gm/dL  Auto Neutrophil # : x  Auto Lymphocyte # : x  Auto Monocyte # : x  Auto Eosinophil # : x  Auto Basophil # : x  Auto Neutrophil % : x  Auto Lymphocyte % : x  Auto Monocyte % : x  Auto Eosinophil % : x  Auto Basophil % : x    12-19    141  |  103  |  11  ----------------------------<  126<H>  4.4   |  27  |  0.58    Ca    8.8      19 Dec 2022 02:15  Phos  3.3     12-19  Mg     2.0     12-19    TPro  6.1  /  Alb  3.8  /  TBili  1.1  /  DBili  x   /  AST  19  /  ALT  8<L>  /  AlkPhos  109  12-19    PT/INR - ( 19 Dec 2022 02:15 )   PT: 12.8 sec;   INR: 1.07          PTT - ( 19 Dec 2022 02:15 )  PTT:24.1 sec  The current medications were reviewed   MEDICATIONS  (STANDING):  aspirin enteric coated 81 milliGRAM(s) Oral daily  chlorhexidine 2% Cloths 1 Application(s) Topical <User Schedule>  clopidogrel Tablet 75 milliGRAM(s) Oral daily  dextrose 50% Injectable 25 Gram(s) IV Push once  dextrose 50% Injectable 12.5 Gram(s) IV Push once  heparin   Injectable 5000 Unit(s) SubCutaneous every 8 hours  insulin glargine Injectable (LANTUS) 12 Unit(s) SubCutaneous at bedtime  insulin lispro (ADMELOG) corrective regimen sliding scale   SubCutaneous Before meals and at bedtime  insulin lispro Injectable (ADMELOG) 4 Unit(s) SubCutaneous three times a day before meals  levothyroxine 100 MICROGram(s) Oral daily  metoprolol tartrate 25 milliGRAM(s) Oral <User Schedule>  pantoprazole    Tablet 40 milliGRAM(s) Oral daily  PARoxetine 10 milliGRAM(s) Oral daily  polyethylene glycol 3350 17 Gram(s) Oral daily  senna 2 Tablet(s) Oral at bedtime  sodium chloride 0.9%. 1000 milliLiter(s) (10 mL/Hr) IV Continuous <Continuous>    MEDICATIONS  (PRN):  acetaminophen     Tablet .. 650 milliGRAM(s) Oral every 6 hours PRN Mild Pain (1 - 3)  oxyCODONE    IR 5 milliGRAM(s) Oral every 6 hours PRN Moderate Pain (4 - 6)  oxyCODONE    IR 10 milliGRAM(s) Oral every 6 hours PRN Severe Pain (7 - 10)      Assessment/Plan:  77yr old female with PMH HLD, CAD, DM, left lung ca sp partial lobectomy, hypothyroidism, admitted for surgical mgmt of 3V CAD.     POD3 OpCABG x 4 (LIMA-LAD, SVG-Diag, SVG-OM-Diag sequential, nl EF, Garces, 12/15)  Acute post operative anemia-trend H/H  Aspirin  Plavix  bblocker, uptitrate prn  Lasix prn  Diet as tolerated  Replete lytes prn  GI/DVT PPX  Bowel Regimen  Pain control  OOB with PT  Close hemodynamic, ventilatory and drain monitoring and management per post op routine  Monitor for arrhythmias and monitor parameters for organ perfusion  Monitor neurologic status  Head of the bed should remain elevated to 45 deg   Chest PT and IS will be encouraged  Monitor adequacy of oxygenation and ventilation and attempt to wean oxygen  Antibiotic regimen will be tailored to the clinical, laboratory and microbiologic data  Nutritional goals will be met using po eventually, ensure adequate caloric intake and monitor the same  Stress ulcer and VTE prophylaxis will be achieved    Glycemic control is satisfactory  Electrolytes have been repleted as necessary and wound care has been carried out   Pain control has been achieved.   Aggressive physical therapy and early mobility and ambulation goals will be met   The family was updated about the course and plan.    CRITICAL CARE TIME personally provided by me  in evaluation and management, reassessments, review and interpretation of labs and x-rays, ventilator and hemodynamic management, formulating a plan and coordinating care: __35____ MIN.  Time does not include procedural time.    CTICU ATTENDING     					  Beverly Burciaga MD

## 2022-12-19 NOTE — PROGRESS NOTE ADULT - ATTENDING COMMENTS
THe patient was seen with Dr. Gómez. She is here for elective CABG. Glucose levels were 173-120 yesterday and today 132-131, I agree with continuing Lantus 12 units with 4 units pre-meal Lispro Insulin.

## 2022-12-19 NOTE — PROGRESS NOTE ADULT - SUBJECTIVE AND OBJECTIVE BOX
OVERNIGHT: No acute events overnight.   SUBJECTIVE / INTERVAL HPI: Patient was seen and examined this morning. She denied having any concerns or complaints. Her appetite is good. Blood glucose levels have remained within target range.     CAPILLARY BLOOD GLUCOSE & INSULIN RECEIVED  173 mg/dL (12-18 @ 16:45) -> 4 units of lispro + 2 units of sliding scale.    120 mg/dL (12-18 @ 21:35) -> 12 units of lispro   132 mg/dL (12-19 @ 06:18) -> 4 units of lispro.   131 mg/dL (12-19 @ 11:23) -> 4 units of lispro.      REVIEW OF SYSTEMS  Constitutional:  Negative fever, chills or loss of appetite.  Cardiovascular:  Negative for chest pain or palpitations.  Respiratory:  Negative for cough, wheezing, or shortness of breath.    Gastrointestinal:  Negative for nausea, vomiting, diarrhea, constipation, or abdominal pain.  Genitourinary:  Negative frequency, urgency or dysuria.  Neurologic:  No headache, confusion, dizziness, lightheadedness.    PHYSICAL EXAM  Vital Signs Last 24 Hrs  T(C): 36.5 (19 Dec 2022 17:02), Max: 36.8 (19 Dec 2022 13:48)  T(F): 97.7 (19 Dec 2022 17:02), Max: 98.3 (19 Dec 2022 13:48)  HR: 79 (19 Dec 2022 16:57) (70 - 97)  BP: 147/74 (19 Dec 2022 16:57) (120/61 - 195/104)  BP(mean): 103 (19 Dec 2022 16:57) (82 - 141)  RR: 20 (19 Dec 2022 16:57) (18 - 20)  SpO2: 95% (19 Dec 2022 16:57) (88% - 95%)    Parameters below as of 19 Dec 2022 16:57  Patient On (Oxygen Delivery Method): nasal cannula w/ humidification  O2 Flow (L/min): 6    Constitutional: Awake, alert, elderly female in no acute distress.   HEENT: Normocephalic, atraumatic, JARETH.  Respiratory: Lungs clear to ausculation bilaterally.   Cardiovascular: regular rhythm, normal S1 and S2, no audible murmurs.   GI: soft, non-tender, non-distended, bowel sounds present.  Extremities: No lower extremity edema.  Psychiatric: AAO x 3. Normal affect/mood.     LABS  CBC - WBC/HGB/HTC/PLT: 10.62/10.2/30.4/231 (12-19-22)  BMP - Na/K/Cl/Bicarb/BUN/Cr/Gluc/AG/eGFR: 141/4.4/103/27/11/0.58/126/11/92 (12-19-22)  Ca - 8.8 (12-19-22)  Phos - 3.3 (12-19-22)  Mg - 2.0 (12-19-22)  LFT - Alb/Tprot/Tbili/Dbili/AlkPhos/ALT/AST: 3.8/--/1.1/--/109/8/19 (12-19-22)  PT/aPTT/INR: 12.8/24.1/1.07 (12-19-22)   Thyroid Stimulating Hormone, Serum: 2.260 (12-09-22)    MEDICATIONS  MEDICATIONS  (STANDING):  aspirin enteric coated 81 milliGRAM(s) Oral daily  buDESOnide    Inhalation Suspension 0.5 milliGRAM(s) Inhalation every 12 hours  clopidogrel Tablet 75 milliGRAM(s) Oral daily  dextrose 50% Injectable 25 Gram(s) IV Push once  dextrose 50% Injectable 12.5 Gram(s) IV Push once  furosemide    Tablet 40 milliGRAM(s) Oral daily  heparin   Injectable 5000 Unit(s) SubCutaneous every 8 hours  insulin glargine Injectable (LANTUS) 12 Unit(s) SubCutaneous at bedtime  insulin lispro (ADMELOG) corrective regimen sliding scale   SubCutaneous Before meals and at bedtime  insulin lispro Injectable (ADMELOG) 4 Unit(s) SubCutaneous three times a day before meals  levothyroxine 100 MICROGram(s) Oral daily  metoprolol tartrate 25 milliGRAM(s) Oral <User Schedule>  pantoprazole    Tablet 40 milliGRAM(s) Oral daily  PARoxetine 10 milliGRAM(s) Oral daily  polyethylene glycol 3350 17 Gram(s) Oral daily  potassium chloride    Tablet ER 20 milliEquivalent(s) Oral daily  senna 2 Tablet(s) Oral at bedtime  sodium chloride 0.9% lock flush 3 milliLiter(s) IV Push every 8 hours    MEDICATIONS  (PRN):  acetaminophen     Tablet .. 650 milliGRAM(s) Oral every 6 hours PRN Mild Pain (1 - 3)  oxyCODONE    IR 5 milliGRAM(s) Oral every 6 hours PRN Moderate Pain (4 - 6)  oxyCODONE    IR 10 milliGRAM(s) Oral every 6 hours PRN Severe Pain (7 - 10)    ASSESSMENT / RECOMMENDATIONS  Ms. Constantino is a 77-year-old female with a past medical history of type 2 diabetes mellitus, hypothyroidism, hyperlipidemia, coronary artery disease (s/p PCI on 2012) and left lung CA (s/p partial lobectomy) who was admitted on 12/14/22 for elective surgery s/p CABG on 12/15/22 (LIMA-LAD, SVG-Diag, SVG-OM-Diag seq). Endocrinology was consulted for recommendations regarding glycemic control.     A1C: 9.2 %  BUN: 11  Creatinine: 0.58  GFR: 92  Weight: 52.6  BMI: 21.9  EF: >75%    # Type 2 diabetes mellitus  - Please continue with lantus to 12 units at bedtime.   - Continue with lispro to 4 units before each meal.  - Continue lispro moderate dose sliding scale before meals and at bedtime.  - Patient's fingerstick glucose goal is 100-180 mg/dL.    - Patient can follow up at discharge with [facility] by calling [phone] to make an appointment.      Case discussed with Dr. Pereira. Primary team updated.       Flo Lemon    Endocrinology Fellow    Service Pager: [phone]

## 2022-12-20 DIAGNOSIS — I25.110 ATHEROSCLEROTIC HEART DISEASE OF NATIVE CORONARY ARTERY WITH UNSTABLE ANGINA PECTORIS: ICD-10-CM

## 2022-12-20 DIAGNOSIS — R94.39 ABNORMAL RESULT OF OTHER CARDIOVASCULAR FUNCTION STUDY: ICD-10-CM

## 2022-12-20 DIAGNOSIS — E11.9 TYPE 2 DIABETES MELLITUS WITHOUT COMPLICATIONS: ICD-10-CM

## 2022-12-20 DIAGNOSIS — I25.84 CORONARY ATHEROSCLEROSIS DUE TO CALCIFIED CORONARY LESION: ICD-10-CM

## 2022-12-20 LAB
ANION GAP SERPL CALC-SCNC: 19 MMOL/L — HIGH (ref 5–17)
BASOPHILS # BLD AUTO: 0.06 K/UL — SIGNIFICANT CHANGE UP (ref 0–0.2)
BASOPHILS NFR BLD AUTO: 0.7 % — SIGNIFICANT CHANGE UP (ref 0–2)
BUN SERPL-MCNC: 15 MG/DL — SIGNIFICANT CHANGE UP (ref 7–23)
CALCIUM SERPL-MCNC: 9 MG/DL — SIGNIFICANT CHANGE UP (ref 8.4–10.5)
CHLORIDE SERPL-SCNC: 100 MMOL/L — SIGNIFICANT CHANGE UP (ref 96–108)
CO2 SERPL-SCNC: 19 MMOL/L — LOW (ref 22–31)
CREAT SERPL-MCNC: 0.55 MG/DL — SIGNIFICANT CHANGE UP (ref 0.5–1.3)
EGFR: 93 ML/MIN/1.73M2 — SIGNIFICANT CHANGE UP
EOSINOPHIL # BLD AUTO: 0.15 K/UL — SIGNIFICANT CHANGE UP (ref 0–0.5)
EOSINOPHIL NFR BLD AUTO: 1.8 % — SIGNIFICANT CHANGE UP (ref 0–6)
GLUCOSE BLDC GLUCOMTR-MCNC: 123 MG/DL — HIGH (ref 70–99)
GLUCOSE BLDC GLUCOMTR-MCNC: 123 MG/DL — HIGH (ref 70–99)
GLUCOSE BLDC GLUCOMTR-MCNC: 148 MG/DL — HIGH (ref 70–99)
GLUCOSE BLDC GLUCOMTR-MCNC: 251 MG/DL — HIGH (ref 70–99)
GLUCOSE BLDC GLUCOMTR-MCNC: 261 MG/DL — HIGH (ref 70–99)
GLUCOSE SERPL-MCNC: 134 MG/DL — HIGH (ref 70–99)
HCT VFR BLD CALC: 35.9 % — SIGNIFICANT CHANGE UP (ref 34.5–45)
HGB BLD-MCNC: 11.4 G/DL — LOW (ref 11.5–15.5)
IMM GRANULOCYTES NFR BLD AUTO: 0.6 % — SIGNIFICANT CHANGE UP (ref 0–0.9)
LYMPHOCYTES # BLD AUTO: 2.02 K/UL — SIGNIFICANT CHANGE UP (ref 1–3.3)
LYMPHOCYTES # BLD AUTO: 24 % — SIGNIFICANT CHANGE UP (ref 13–44)
MAGNESIUM SERPL-MCNC: 1.8 MG/DL — SIGNIFICANT CHANGE UP (ref 1.6–2.6)
MCHC RBC-ENTMCNC: 28.4 PG — SIGNIFICANT CHANGE UP (ref 27–34)
MCHC RBC-ENTMCNC: 31.8 GM/DL — LOW (ref 32–36)
MCV RBC AUTO: 89.5 FL — SIGNIFICANT CHANGE UP (ref 80–100)
MONOCYTES # BLD AUTO: 0.76 K/UL — SIGNIFICANT CHANGE UP (ref 0–0.9)
MONOCYTES NFR BLD AUTO: 9 % — SIGNIFICANT CHANGE UP (ref 2–14)
NEUTROPHILS # BLD AUTO: 5.39 K/UL — SIGNIFICANT CHANGE UP (ref 1.8–7.4)
NEUTROPHILS NFR BLD AUTO: 63.9 % — SIGNIFICANT CHANGE UP (ref 43–77)
NRBC # BLD: 0 /100 WBCS — SIGNIFICANT CHANGE UP (ref 0–0)
PLATELET # BLD AUTO: 306 K/UL — SIGNIFICANT CHANGE UP (ref 150–400)
POTASSIUM SERPL-MCNC: 3.9 MMOL/L — SIGNIFICANT CHANGE UP (ref 3.5–5.3)
POTASSIUM SERPL-SCNC: 3.9 MMOL/L — SIGNIFICANT CHANGE UP (ref 3.5–5.3)
RBC # BLD: 4.01 M/UL — SIGNIFICANT CHANGE UP (ref 3.8–5.2)
RBC # FLD: 14 % — SIGNIFICANT CHANGE UP (ref 10.3–14.5)
SODIUM SERPL-SCNC: 138 MMOL/L — SIGNIFICANT CHANGE UP (ref 135–145)
WBC # BLD: 8.43 K/UL — SIGNIFICANT CHANGE UP (ref 3.8–10.5)
WBC # FLD AUTO: 8.43 K/UL — SIGNIFICANT CHANGE UP (ref 3.8–10.5)

## 2022-12-20 PROCEDURE — 71046 X-RAY EXAM CHEST 2 VIEWS: CPT | Mod: 26

## 2022-12-20 RX ORDER — MAGNESIUM OXIDE 400 MG ORAL TABLET 241.3 MG
400 TABLET ORAL ONCE
Refills: 0 | Status: COMPLETED | OUTPATIENT
Start: 2022-12-20 | End: 2022-12-20

## 2022-12-20 RX ORDER — FUROSEMIDE 40 MG
20 TABLET ORAL ONCE
Refills: 0 | Status: COMPLETED | OUTPATIENT
Start: 2022-12-20 | End: 2022-12-20

## 2022-12-20 RX ORDER — METOPROLOL TARTRATE 50 MG
37.5 TABLET ORAL
Refills: 0 | Status: DISCONTINUED | OUTPATIENT
Start: 2022-12-20 | End: 2022-12-21

## 2022-12-20 RX ADMIN — SENNA PLUS 2 TABLET(S): 8.6 TABLET ORAL at 21:53

## 2022-12-20 RX ADMIN — Medication 0.5 MILLIGRAM(S): at 06:09

## 2022-12-20 RX ADMIN — Medication 25 MILLIGRAM(S): at 06:09

## 2022-12-20 RX ADMIN — SODIUM CHLORIDE 3 MILLILITER(S): 9 INJECTION INTRAMUSCULAR; INTRAVENOUS; SUBCUTANEOUS at 06:02

## 2022-12-20 RX ADMIN — Medication 40 MILLIGRAM(S): at 06:08

## 2022-12-20 RX ADMIN — Medication 10 MILLIGRAM(S): at 12:48

## 2022-12-20 RX ADMIN — Medication 4 UNIT(S): at 12:57

## 2022-12-20 RX ADMIN — Medication 6: at 12:46

## 2022-12-20 RX ADMIN — PANTOPRAZOLE SODIUM 40 MILLIGRAM(S): 20 TABLET, DELAYED RELEASE ORAL at 12:47

## 2022-12-20 RX ADMIN — Medication 4 UNIT(S): at 17:55

## 2022-12-20 RX ADMIN — HEPARIN SODIUM 5000 UNIT(S): 5000 INJECTION INTRAVENOUS; SUBCUTANEOUS at 13:08

## 2022-12-20 RX ADMIN — HEPARIN SODIUM 5000 UNIT(S): 5000 INJECTION INTRAVENOUS; SUBCUTANEOUS at 21:53

## 2022-12-20 RX ADMIN — POLYETHYLENE GLYCOL 3350 17 GRAM(S): 17 POWDER, FOR SOLUTION ORAL at 12:47

## 2022-12-20 RX ADMIN — Medication 81 MILLIGRAM(S): at 12:47

## 2022-12-20 RX ADMIN — SODIUM CHLORIDE 3 MILLILITER(S): 9 INJECTION INTRAMUSCULAR; INTRAVENOUS; SUBCUTANEOUS at 13:19

## 2022-12-20 RX ADMIN — Medication 20 MILLIEQUIVALENT(S): at 12:47

## 2022-12-20 RX ADMIN — Medication 650 MILLIGRAM(S): at 00:02

## 2022-12-20 RX ADMIN — SODIUM CHLORIDE 3 MILLILITER(S): 9 INJECTION INTRAMUSCULAR; INTRAVENOUS; SUBCUTANEOUS at 22:00

## 2022-12-20 RX ADMIN — Medication 6: at 21:53

## 2022-12-20 RX ADMIN — HEPARIN SODIUM 5000 UNIT(S): 5000 INJECTION INTRAVENOUS; SUBCUTANEOUS at 06:09

## 2022-12-20 RX ADMIN — Medication 20 MILLIGRAM(S): at 17:55

## 2022-12-20 RX ADMIN — CLOPIDOGREL BISULFATE 75 MILLIGRAM(S): 75 TABLET, FILM COATED ORAL at 12:47

## 2022-12-20 RX ADMIN — INSULIN GLARGINE 12 UNIT(S): 100 INJECTION, SOLUTION SUBCUTANEOUS at 22:32

## 2022-12-20 RX ADMIN — Medication 37.5 MILLIGRAM(S): at 17:54

## 2022-12-20 RX ADMIN — MAGNESIUM OXIDE 400 MG ORAL TABLET 400 MILLIGRAM(S): 241.3 TABLET ORAL at 17:54

## 2022-12-20 RX ADMIN — Medication 100 MICROGRAM(S): at 06:09

## 2022-12-20 RX ADMIN — Medication 0.5 MILLIGRAM(S): at 17:58

## 2022-12-20 NOTE — PROGRESS NOTE ADULT - ASSESSMENT
Ms. Constantino is a 77-year-old female with a past medical history of type 2 diabetes mellitus, hypothyroidism, hyperlipidemia, coronary artery disease (s/p PCI on 2012) and left lung CA (s/p partial lobectomy) who was admitted on 12/14/22 for elective surgery s/p CABG on 12/15/22 (LIMA-LAD, SVG-Diag, SVG-OM-Diag seq). Endocrinology was consulted for recommendations regarding glycemic control.       A1C: 9.2 %  BUN: 15 mg/dL  Creatinine: 0.55 mg/dL  GFR: 93 mL/min/1.73m2  Weight (kg): 52.6  BMI (kg/m2): 21.9  EF: >75%

## 2022-12-20 NOTE — PROGRESS NOTE ADULT - PROBLEM SELECTOR PLAN 1
Type 2 diabetes mellitus  - Please continue with lantus to 12 units at bedtime.   - Continue with lispro to 4 units before each meal.  - Continue lispro moderate dose sliding scale before meals and at bedtime.  - Patient's fingerstick glucose goal is 100-180 mg/dL.    - Patient can follow up at discharge with [facility] by calling [phone] to make an appointment.      Case discussed with Dr. Pereira. Primary team updated.

## 2022-12-20 NOTE — PROGRESS NOTE ADULT - ASSESSMENT
78 yo female, former heavy smoker presents with a PMH of HLD, CAD (s/p PTCA in 2012), DM-II, left Lung CA s/p partial lobectomy and Hypothyroidism with class III angina with severe 3 vessel CAD now s/p uncomplicated OPCABx4 with normal EF on 12/15. Patient arrived to the ICU intubated on no gtts. Overnight on POD#0 patient developed a lactic acidosis and low urine output; thought to be in a low flow state. She was started on dobutamine for worsening RV function.  Over POD#1-3 patient was slowly weaned off of the dobutamine. POD#4 patient [facility] started [facility]. Today patient is continuing diuresis. Patient evaluated by PT. Recommended for home PT vs subacute rehab. Due to lack of help at home patient will go to subacute rehab.     Neuro: pain management, AoX3 but impulsive. history of depression   -  Will require 1:1 observation overnight.   - Oxy PRN   - continue paxil 10mg Daily     CVS:   - Continue ASA and Plavix, Statin for CAD   - continue Lopressor 37.5  BID for afib ppx; Titrate up as tolerated.      Respiratory: On 6LNC   - Wean off O2 sat > 92%  - IS and ambulation  - Chest PT.   - continue Budesonide.    GI: passing flatus   - GI PPX   - Bowel regimen with Senna and Miralax     : BUN/ Creatinine 15/0.55 -Heredia; receiving extra dose of IV Lasix today.   - Lasix 40 PO Daily.   - monitor I/Os.     Endo: A1C 9.0 FS well controlled.   - Endocrine following.   - 4/4/4/12  - Synthroid 100 mcg Daily     ID: afebrile WBC wnl   - completed periop ABX   - continue to monitor fever curve     Heme: DVT PPX   - SQH     Dispo plan: [facility] when accepted      Sandra Coates PA-C

## 2022-12-20 NOTE — PROGRESS NOTE ADULT - SUBJECTIVE AND OBJECTIVE BOX
Patient discussed on morning rounds with Dr. Kiel Rdz     Operation / Date: 12/15: OPCABx4 (LIMA-LAD, SVG- OM-Diag seq, SVG-Diag) EF normal     SUBJECTIVE ASSESSMENT:  77y Female states that she is okay with going to a subacute rehab if its necessary. She confirms that she does not have support at home and that she would  need extra help. No other complaints at this time.         Vital Signs Last 24 Hrs  T(C): 36.7 (20 Dec 2022 08:53), Max: 36.7 (20 Dec 2022 08:53)  T(F): 98.1 (20 Dec 2022 08:53), Max: 98.1 (20 Dec 2022 08:53)  HR: 78 (20 Dec 2022 13:36) (68 - 79)  BP: 151/69 (20 Dec 2022 13:36) (98/57 - 151/69)  BP(mean): 93 (20 Dec 2022 06:06) (71 - 103)  RR: 16 (20 Dec 2022 13:36) (16 - 20)  SpO2: 96% (20 Dec 2022 13:36) (93% - 96%)    Parameters below as of 20 Dec 2022 13:36    O2 Flow (L/min): 4    I&O's Detail    19 Dec 2022 07:01  -  20 Dec 2022 07:00  --------------------------------------------------------  IN:    Oral Fluid: 100 mL  Total IN: 100 mL    OUT:    Voided (mL): 1550 mL  Total OUT: 1550 mL    Total NET: -1450 mL          CHEST TUBE:  No.   SHONNA DRAIN:  No.  EPICARDIAL WIRES: Yes  TIE DOWNS: Yes  MANZO: No.    PHYSICAL EXAM:    GEN: NAD, well appearing today.   Neuro: A&Ox3.  No focal deficits.  Moving all extremities.   HEENT: No obvious abnormalities  CV: S1S2, regular, no murmurs appreciated.  No carotid bruits.  No JVD  Lungs:  Bibasilar crackles. No wheeze or rhonchi.   ABD: Soft, non-tender, non-distended.  +Bowel sounds  EXT: Warm and well perfused. Trace bilateral lower extremity edema.   Musculoskeletal: Moving all extremities with normal ROM, no joint swelling  PV: Pedal pulses palpable    LABS:                        11.4   8.43  )-----------( 306      ( 20 Dec 2022 05:30 )             35.9       COUMADIN: No     PT/INR - ( 19 Dec 2022 02:15 )   PT: 12.8 sec;   INR: 1.07          PTT - ( 19 Dec 2022 02:15 )  PTT:24.1 sec    12-20    138  |  100  |  15  ----------------------------<  134<H>  3.9   |  19<L>  |  0.55    Ca    9.0      20 Dec 2022 05:30  Phos  3.3     12-19  Mg     1.8     12-20    TPro  6.1  /  Alb  3.8  /  TBili  1.1  /  DBili  x   /  AST  19  /  ALT  8<L>  /  AlkPhos  109  12-19          MEDICATIONS  (STANDING):  aspirin enteric coated 81 milliGRAM(s) Oral daily  buDESOnide    Inhalation Suspension 0.5 milliGRAM(s) Inhalation every 12 hours  clopidogrel Tablet 75 milliGRAM(s) Oral daily  dextrose 50% Injectable 25 Gram(s) IV Push once  dextrose 50% Injectable 12.5 Gram(s) IV Push once  furosemide    Tablet 40 milliGRAM(s) Oral daily  heparin   Injectable 5000 Unit(s) SubCutaneous every 8 hours  insulin glargine Injectable (LANTUS) 12 Unit(s) SubCutaneous at bedtime  insulin lispro (ADMELOG) corrective regimen sliding scale   SubCutaneous Before meals and at bedtime  insulin lispro Injectable (ADMELOG) 4 Unit(s) SubCutaneous three times a day before meals  levothyroxine 100 MICROGram(s) Oral daily  metoprolol tartrate 25 milliGRAM(s) Oral <User Schedule>  pantoprazole    Tablet 40 milliGRAM(s) Oral daily  PARoxetine 10 milliGRAM(s) Oral daily  polyethylene glycol 3350 17 Gram(s) Oral daily  potassium chloride    Tablet ER 20 milliEquivalent(s) Oral daily  senna 2 Tablet(s) Oral at bedtime  sodium chloride 0.9% lock flush 3 milliLiter(s) IV Push every 8 hours    MEDICATIONS  (PRN):  acetaminophen     Tablet .. 650 milliGRAM(s) Oral every 6 hours PRN Mild Pain (1 - 3)  oxyCODONE    IR 5 milliGRAM(s) Oral every 6 hours PRN Moderate Pain (4 - 6)  oxyCODONE    IR 10 milliGRAM(s) Oral every 6 hours PRN Severe Pain (7 - 10)        RADIOLOGY & ADDITIONAL TESTS:  < from: Xray Chest 2 Views PA/Lat (12.20.22 @ 09:31) >      < end of copied text >  < from: Xray Chest 2 Views PA/Lat (12.20.22 @ 09:31) >  COMPARISON: December 19, 2022    Findings/  impression: Stable cardiomegaly, status post median sternotomy, CABG.   Bilateral opacities/left pleural effusion, unchanged..    --- End of Report ---    < end of copied text >

## 2022-12-20 NOTE — PROGRESS NOTE ADULT - SUBJECTIVE AND OBJECTIVE BOX
OVERNIGHT: No acute events overnight.   SUBJECTIVE / INTERVAL HPI: Patient was seen and examined this morning. She complains of canker sore in mouth. Her appetite is good. Blood glucose levels have remained within target range.     CAPILLARY BLOOD GLUCOSE & INSULIN RECEIVED  Yesterday  - Dinner FS mg/dL = 4 units of Lispro sliding scale.   - Bedtime FS mg/dL = 12 units of Lantus     Today  - Breakfast FS mg/dL = 0 units of premeal Lispro - off floor. Ate [place] toast, little oatmeal and fruit.   - Lunch FS mg/dL = 4 units of premeal Lispro + 6 units of Lispro sliding scale.     251 mg/dL ( @ 12:39)  148 mg/dL ( @ 08:35)  123 mg/dL ( @ 06:14)  129 mg/dL ( @ 21:43)  106 mg/dL ( @ 16:34)    REVIEW OF SYSTEMS  Constitutional:  Negative fever, chills or loss of appetite.  Eyes:  Negative blurry vision or double vision.  Cardiovascular:  Negative for chest pain or palpitations.  Respiratory:  Negative for cough, wheezing, or shortness of breath.    Gastrointestinal:  Negative for nausea, vomiting, diarrhea, constipation, or abdominal pain.  Genitourinary:  Negative frequency, urgency or dysuria.  Neurologic:  No headache, confusion, dizziness, lightheadedness.    PHYSICAL EXAM  Vital Signs Last 24 Hrs  T(C): 36.7 (20 Dec 2022 08:53), Max: 36.8 (19 Dec 2022 13:48)  T(F): 98.1 (20 Dec 2022 08:53), Max: 98.3 (19 Dec 2022 13:48)  HR: 72 (20 Dec 2022 09:00) (68 - 79)  BP: 134/66 (20 Dec 2022 09:00) (98/57 - 147/74)  BP(mean): 93 (20 Dec 2022 06:06) (71 - 103)  RR: 18 (20 Dec 2022 09:00) (16 - 20)  SpO2: 94% (20 Dec 2022 09:00) (93% - 96%)    Parameters below as of 20 Dec 2022 09:00    O2 Flow (L/min): 6  Constitutional: Awake, alert, elderly female in no acute distress.   HEENT: Normocephalic, atraumatic, JARETH.  Respiratory: Lungs clear to ausculation bilaterally.   Cardiovascular: regular rhythm, normal S1 and S2, no audible murmurs.   GI: soft, non-tender, non-distended, bowel sounds present.  Extremities: No lower extremity edema.  Psychiatric: AAO x 3. Normal affect/mood.     LABS  CBC - WBC/HGB/HTC/PLT: 8.43/11.4/35.9/306 (22)  BMP - Na/K/Cl/Bicarb/BUN/Cr/Gluc: 138/3.9/100/19/15/0.55/134 (22)  Anion Gap: 19 (22)  eGFR: 93 (22)  Calcium: 9.0 (22)  Phosphorus: -- (22)  Magnesium: 1.8 (22)  LFT - Alb/Tprot/Tbili/Dbili/AlkPhos/ALT/AST: 3.8/--/1.1/--/109/8/19 (22)  PT/aPTT/INR: 12.8/24.1/1.07 (22)   Thyroid Stimulating Hormone, Serum: 2.260 (22)        MEDICATIONS  MEDICATIONS  (STANDING):  aspirin enteric coated 81 milliGRAM(s) Oral daily  buDESOnide    Inhalation Suspension 0.5 milliGRAM(s) Inhalation every 12 hours  clopidogrel Tablet 75 milliGRAM(s) Oral daily  dextrose 50% Injectable 25 Gram(s) IV Push once  dextrose 50% Injectable 12.5 Gram(s) IV Push once  furosemide    Tablet 40 milliGRAM(s) Oral daily  heparin   Injectable 5000 Unit(s) SubCutaneous every 8 hours  insulin glargine Injectable (LANTUS) 12 Unit(s) SubCutaneous at bedtime  insulin lispro (ADMELOG) corrective regimen sliding scale   SubCutaneous Before meals and at bedtime  insulin lispro Injectable (ADMELOG) 4 Unit(s) SubCutaneous three times a day before meals  levothyroxine 100 MICROGram(s) Oral daily  metoprolol tartrate 25 milliGRAM(s) Oral <User Schedule>  pantoprazole    Tablet 40 milliGRAM(s) Oral daily  PARoxetine 10 milliGRAM(s) Oral daily  polyethylene glycol 3350 17 Gram(s) Oral daily  potassium chloride    Tablet ER 20 milliEquivalent(s) Oral daily  senna 2 Tablet(s) Oral at bedtime  sodium chloride 0.9% lock flush 3 milliLiter(s) IV Push every 8 hours    MEDICATIONS  (PRN):  acetaminophen     Tablet .. 650 milliGRAM(s) Oral every 6 hours PRN Mild Pain (1 - 3)  oxyCODONE    IR 5 milliGRAM(s) Oral every 6 hours PRN Moderate Pain (4 - 6)  oxyCODONE    IR 10 milliGRAM(s) Oral every 6 hours PRN Severe Pain (7 - 10)

## 2022-12-20 NOTE — PROGRESS NOTE ADULT - PROVIDER SPECIALTY LIST ADULT
Critical Care
Critical Care
CT Surgery
Critical Care
CT Surgery
Critical Care
Endocrinology
Endocrinology

## 2022-12-20 NOTE — DIETITIAN INITIAL EVALUATION ADULT - PERTINENT LABORATORY DATA
12-20    138  |  100  |  15  ----------------------------<  134<H>  3.9   |  19<L>  |  0.55    Ca    9.0      20 Dec 2022 05:30  Phos  3.3     12-19  Mg     1.8     12-20    TPro  6.1  /  Alb  3.8  /  TBili  1.1  /  DBili  x   /  AST  19  /  ALT  8<L>  /  AlkPhos  109  12-19  POCT Blood Glucose.: 251 mg/dL (12-20-22 @ 12:39)  A1C with Estimated Average Glucose Result: 9.2 % (12-09-22 @ 09:38)

## 2022-12-20 NOTE — DIETITIAN INITIAL EVALUATION ADULT - ADD RECOMMEND
1. Continue with current diet order (monitor need for ONS) 2. Encourage pt to meet nutritional needs as able 3. RD to obtain subjective information and provide diet ed as able 4. Pain and bowel regimen per team 5. Will assess/honor preferences as able

## 2022-12-20 NOTE — DIETITIAN INITIAL EVALUATION ADULT - OTHER INFO
78 yo female, former heavy smoker presents with a PMH of HLD, CAD (s/p PTCA in 2012), DM-II, left Lung CA s/p partial lobectomy and Hypothyroidism with class III angina with severe 3 vessel CAD now s/p uncomplicated OPCABx4 with normal EF on 12/15. Patient arrived to the ICU intubated on no gtts. Overnight on POD#0 patient developed a lactic acidosis and low urine output; thought to be in a low flow state. She was started on dobutamine for worsening RV function.  Over POD#1-3 patient was slowly weaned off of the dobutamine. POD#4 patient was started on Lasix and transferred to [facility]. Today patient is continuing diuresis. Patient evaluated by PT. Recommended for home PT vs subacute rehab. Due to lack of help at home patient will go to subacute rehab.     Pt seen at bedside for initial assessment- on NC w/ humidification. No n/v/d/c documented at this time. Last documented bowel movement 12/19. Confirms NKFA. Denies change in appetite PTA; endorses 2.5 meals/day at home (reports following regular diet at home; ordering all meals in does not cook for herself). Reports usual body weight 116 pounds (relatively consistent with dosing weight 115 pounds). No edema documented at this time. No pressure ulcers documented at this time. Surgical incision per chart. Juan score=19. Labs reviewed 12/20; electrolytes within normal limits at this time. Fingersticks 12/19-12/20: 106-251 mg/dL; insulin regimen ordered. Observed pt with no overt signs of muscle or fat wasting. Based on ASPEN guidelines, pt does not meet criteria for malnutrition at this time. Provided pt with diet education regarding importance of meeting nutritional needs post operatively ; amenable to education. Unable to provide diet education at this time, however, pt would benefit from diet ed as able. Pt reports feeling hungry during hospital stay, able to complete >50% of meals. No cultural, ethnic, Latter-day food preferences noted. Made aware RD remains available. RD to follow up. See nutrition recommendations below.

## 2022-12-20 NOTE — DIETITIAN INITIAL EVALUATION ADULT - PERTINENT MEDS FT
MEDICATIONS  (STANDING):  aspirin enteric coated 81 milliGRAM(s) Oral daily  buDESOnide    Inhalation Suspension 0.5 milliGRAM(s) Inhalation every 12 hours  clopidogrel Tablet 75 milliGRAM(s) Oral daily  dextrose 50% Injectable 25 Gram(s) IV Push once  dextrose 50% Injectable 12.5 Gram(s) IV Push once  furosemide    Tablet 40 milliGRAM(s) Oral daily  furosemide   Injectable 20 milliGRAM(s) IV Push once  heparin   Injectable 5000 Unit(s) SubCutaneous every 8 hours  insulin glargine Injectable (LANTUS) 12 Unit(s) SubCutaneous at bedtime  insulin lispro (ADMELOG) corrective regimen sliding scale   SubCutaneous Before meals and at bedtime  insulin lispro Injectable (ADMELOG) 4 Unit(s) SubCutaneous three times a day before meals  levothyroxine 100 MICROGram(s) Oral daily  magnesium oxide 400 milliGRAM(s) Oral once  metoprolol tartrate 37.5 milliGRAM(s) Oral two times a day  pantoprazole    Tablet 40 milliGRAM(s) Oral daily  PARoxetine 10 milliGRAM(s) Oral daily  polyethylene glycol 3350 17 Gram(s) Oral daily  potassium chloride    Tablet ER 20 milliEquivalent(s) Oral daily  senna 2 Tablet(s) Oral at bedtime  sodium chloride 0.9% lock flush 3 milliLiter(s) IV Push every 8 hours    MEDICATIONS  (PRN):  acetaminophen     Tablet .. 650 milliGRAM(s) Oral every 6 hours PRN Mild Pain (1 - 3)  oxyCODONE    IR 5 milliGRAM(s) Oral every 6 hours PRN Moderate Pain (4 - 6)  oxyCODONE    IR 10 milliGRAM(s) Oral every 6 hours PRN Severe Pain (7 - 10)

## 2022-12-21 ENCOUNTER — TRANSCRIPTION ENCOUNTER (OUTPATIENT)
Age: 77
End: 2022-12-21

## 2022-12-21 VITALS — TEMPERATURE: 98 F

## 2022-12-21 LAB
ALBUMIN SERPL ELPH-MCNC: 3.4 G/DL — SIGNIFICANT CHANGE UP (ref 3.3–5)
ALP SERPL-CCNC: 143 U/L — HIGH (ref 40–120)
ALT FLD-CCNC: 10 U/L — SIGNIFICANT CHANGE UP (ref 10–45)
ANION GAP SERPL CALC-SCNC: 12 MMOL/L — SIGNIFICANT CHANGE UP (ref 5–17)
APTT BLD: 32.1 SEC — SIGNIFICANT CHANGE UP (ref 27.5–35.5)
AST SERPL-CCNC: 15 U/L — SIGNIFICANT CHANGE UP (ref 10–40)
BASOPHILS # BLD AUTO: 0.05 K/UL — SIGNIFICANT CHANGE UP (ref 0–0.2)
BASOPHILS NFR BLD AUTO: 0.6 % — SIGNIFICANT CHANGE UP (ref 0–2)
BILIRUB SERPL-MCNC: 1.2 MG/DL — SIGNIFICANT CHANGE UP (ref 0.2–1.2)
BUN SERPL-MCNC: 21 MG/DL — SIGNIFICANT CHANGE UP (ref 7–23)
CALCIUM SERPL-MCNC: 8.9 MG/DL — SIGNIFICANT CHANGE UP (ref 8.4–10.5)
CHLORIDE SERPL-SCNC: 99 MMOL/L — SIGNIFICANT CHANGE UP (ref 96–108)
CO2 SERPL-SCNC: 27 MMOL/L — SIGNIFICANT CHANGE UP (ref 22–31)
CREAT SERPL-MCNC: 0.7 MG/DL — SIGNIFICANT CHANGE UP (ref 0.5–1.3)
EGFR: 88 ML/MIN/1.73M2 — SIGNIFICANT CHANGE UP
EOSINOPHIL # BLD AUTO: 0.18 K/UL — SIGNIFICANT CHANGE UP (ref 0–0.5)
EOSINOPHIL NFR BLD AUTO: 2 % — SIGNIFICANT CHANGE UP (ref 0–6)
GLUCOSE BLDC GLUCOMTR-MCNC: 133 MG/DL — HIGH (ref 70–99)
GLUCOSE BLDC GLUCOMTR-MCNC: 164 MG/DL — HIGH (ref 70–99)
GLUCOSE BLDC GLUCOMTR-MCNC: 227 MG/DL — HIGH (ref 70–99)
GLUCOSE SERPL-MCNC: 191 MG/DL — HIGH (ref 70–99)
HCT VFR BLD CALC: 34.8 % — SIGNIFICANT CHANGE UP (ref 34.5–45)
HGB BLD-MCNC: 11.2 G/DL — LOW (ref 11.5–15.5)
IMM GRANULOCYTES NFR BLD AUTO: 0.9 % — SIGNIFICANT CHANGE UP (ref 0–0.9)
INR BLD: 0.99 — SIGNIFICANT CHANGE UP (ref 0.88–1.16)
LYMPHOCYTES # BLD AUTO: 2.37 K/UL — SIGNIFICANT CHANGE UP (ref 1–3.3)
LYMPHOCYTES # BLD AUTO: 26.1 % — SIGNIFICANT CHANGE UP (ref 13–44)
MAGNESIUM SERPL-MCNC: 1.7 MG/DL — SIGNIFICANT CHANGE UP (ref 1.6–2.6)
MCHC RBC-ENTMCNC: 28.2 PG — SIGNIFICANT CHANGE UP (ref 27–34)
MCHC RBC-ENTMCNC: 32.2 GM/DL — SIGNIFICANT CHANGE UP (ref 32–36)
MCV RBC AUTO: 87.7 FL — SIGNIFICANT CHANGE UP (ref 80–100)
MONOCYTES # BLD AUTO: 0.9 K/UL — SIGNIFICANT CHANGE UP (ref 0–0.9)
MONOCYTES NFR BLD AUTO: 9.9 % — SIGNIFICANT CHANGE UP (ref 2–14)
NEUTROPHILS # BLD AUTO: 5.51 K/UL — SIGNIFICANT CHANGE UP (ref 1.8–7.4)
NEUTROPHILS NFR BLD AUTO: 60.5 % — SIGNIFICANT CHANGE UP (ref 43–77)
NRBC # BLD: 0 /100 WBCS — SIGNIFICANT CHANGE UP (ref 0–0)
PHOSPHATE SERPL-MCNC: 3.7 MG/DL — SIGNIFICANT CHANGE UP (ref 2.5–4.5)
PLATELET # BLD AUTO: 343 K/UL — SIGNIFICANT CHANGE UP (ref 150–400)
POTASSIUM SERPL-MCNC: 3.6 MMOL/L — SIGNIFICANT CHANGE UP (ref 3.5–5.3)
POTASSIUM SERPL-SCNC: 3.6 MMOL/L — SIGNIFICANT CHANGE UP (ref 3.5–5.3)
PROT SERPL-MCNC: 6.3 G/DL — SIGNIFICANT CHANGE UP (ref 6–8.3)
PROTHROM AB SERPL-ACNC: 11.8 SEC — SIGNIFICANT CHANGE UP (ref 10.5–13.4)
RBC # BLD: 3.97 M/UL — SIGNIFICANT CHANGE UP (ref 3.8–5.2)
RBC # FLD: 13.7 % — SIGNIFICANT CHANGE UP (ref 10.3–14.5)
SARS-COV-2 RNA SPEC QL NAA+PROBE: NEGATIVE — SIGNIFICANT CHANGE UP
SARS-COV-2 RNA SPEC QL NAA+PROBE: SIGNIFICANT CHANGE UP
SODIUM SERPL-SCNC: 138 MMOL/L — SIGNIFICANT CHANGE UP (ref 135–145)
WBC # BLD: 9.09 K/UL — SIGNIFICANT CHANGE UP (ref 3.8–10.5)
WBC # FLD AUTO: 9.09 K/UL — SIGNIFICANT CHANGE UP (ref 3.8–10.5)

## 2022-12-21 PROCEDURE — 82947 ASSAY GLUCOSE BLOOD QUANT: CPT

## 2022-12-21 PROCEDURE — 82330 ASSAY OF CALCIUM: CPT

## 2022-12-21 PROCEDURE — 71045 X-RAY EXAM CHEST 1 VIEW: CPT

## 2022-12-21 PROCEDURE — P9016: CPT

## 2022-12-21 PROCEDURE — 86850 RBC ANTIBODY SCREEN: CPT

## 2022-12-21 PROCEDURE — 36430 TRANSFUSION BLD/BLD COMPNT: CPT

## 2022-12-21 PROCEDURE — 86891 AUTOLOGOUS BLOOD OP SALVAGE: CPT

## 2022-12-21 PROCEDURE — 97163 PT EVAL HIGH COMPLEX 45 MIN: CPT

## 2022-12-21 PROCEDURE — 83735 ASSAY OF MAGNESIUM: CPT

## 2022-12-21 PROCEDURE — 80048 BASIC METABOLIC PNL TOTAL CA: CPT

## 2022-12-21 PROCEDURE — 85014 HEMATOCRIT: CPT

## 2022-12-21 PROCEDURE — 94002 VENT MGMT INPAT INIT DAY: CPT

## 2022-12-21 PROCEDURE — 71046 X-RAY EXAM CHEST 2 VIEWS: CPT

## 2022-12-21 PROCEDURE — 87635 SARS-COV-2 COVID-19 AMP PRB: CPT

## 2022-12-21 PROCEDURE — 85610 PROTHROMBIN TIME: CPT

## 2022-12-21 PROCEDURE — 85730 THROMBOPLASTIN TIME PARTIAL: CPT

## 2022-12-21 PROCEDURE — 82010 KETONE BODYS QUAN: CPT

## 2022-12-21 PROCEDURE — 86923 COMPATIBILITY TEST ELECTRIC: CPT

## 2022-12-21 PROCEDURE — U0005: CPT

## 2022-12-21 PROCEDURE — P9045: CPT

## 2022-12-21 PROCEDURE — 84132 ASSAY OF SERUM POTASSIUM: CPT

## 2022-12-21 PROCEDURE — 94640 AIRWAY INHALATION TREATMENT: CPT

## 2022-12-21 PROCEDURE — 93308 TTE F-UP OR LMTD: CPT

## 2022-12-21 PROCEDURE — 36415 COLL VENOUS BLD VENIPUNCTURE: CPT

## 2022-12-21 PROCEDURE — 84295 ASSAY OF SERUM SODIUM: CPT

## 2022-12-21 PROCEDURE — 85025 COMPLETE CBC W/AUTO DIFF WBC: CPT

## 2022-12-21 PROCEDURE — 86901 BLOOD TYPING SEROLOGIC RH(D): CPT

## 2022-12-21 PROCEDURE — 80053 COMPREHEN METABOLIC PANEL: CPT

## 2022-12-21 PROCEDURE — C1751: CPT

## 2022-12-21 PROCEDURE — 97116 GAIT TRAINING THERAPY: CPT

## 2022-12-21 PROCEDURE — 84100 ASSAY OF PHOSPHORUS: CPT

## 2022-12-21 PROCEDURE — 71045 X-RAY EXAM CHEST 1 VIEW: CPT | Mod: 26

## 2022-12-21 PROCEDURE — 93005 ELECTROCARDIOGRAM TRACING: CPT

## 2022-12-21 PROCEDURE — 83605 ASSAY OF LACTIC ACID: CPT

## 2022-12-21 PROCEDURE — C1889: CPT

## 2022-12-21 PROCEDURE — 82803 BLOOD GASES ANY COMBINATION: CPT

## 2022-12-21 PROCEDURE — 85027 COMPLETE CBC AUTOMATED: CPT

## 2022-12-21 PROCEDURE — 97110 THERAPEUTIC EXERCISES: CPT

## 2022-12-21 PROCEDURE — 82962 GLUCOSE BLOOD TEST: CPT

## 2022-12-21 PROCEDURE — U0003: CPT

## 2022-12-21 PROCEDURE — 86900 BLOOD TYPING SEROLOGIC ABO: CPT

## 2022-12-21 RX ORDER — INSULIN LISPRO 100/ML
8 VIAL (ML) SUBCUTANEOUS
Qty: 0 | Refills: 0 | DISCHARGE
Start: 2022-12-21

## 2022-12-21 RX ORDER — METOPROLOL TARTRATE 50 MG
1 TABLET ORAL
Qty: 0 | Refills: 0 | DISCHARGE
Start: 2022-12-21

## 2022-12-21 RX ORDER — POTASSIUM CHLORIDE 20 MEQ
1 PACKET (EA) ORAL
Qty: 0 | Refills: 0 | DISCHARGE
Start: 2022-12-21

## 2022-12-21 RX ORDER — FUROSEMIDE 40 MG
1 TABLET ORAL
Qty: 0 | Refills: 0 | DISCHARGE
Start: 2022-12-21

## 2022-12-21 RX ORDER — ACETAMINOPHEN 500 MG
2 TABLET ORAL
Qty: 0 | Refills: 0 | DISCHARGE
Start: 2022-12-21

## 2022-12-21 RX ORDER — ASPIRIN/CALCIUM CARB/MAGNESIUM 324 MG
1 TABLET ORAL
Qty: 0 | Refills: 0 | DISCHARGE
Start: 2022-12-21

## 2022-12-21 RX ORDER — INSULIN GLARGINE 100 [IU]/ML
12 INJECTION, SOLUTION SUBCUTANEOUS
Qty: 0 | Refills: 0 | DISCHARGE
Start: 2022-12-21

## 2022-12-21 RX ORDER — INSULIN LISPRO 100/ML
8 VIAL (ML) SUBCUTANEOUS
Refills: 0 | Status: DISCONTINUED | OUTPATIENT
Start: 2022-12-21 | End: 2022-12-21

## 2022-12-21 RX ORDER — POTASSIUM CHLORIDE 20 MEQ
40 PACKET (EA) ORAL ONCE
Refills: 0 | Status: COMPLETED | OUTPATIENT
Start: 2022-12-21 | End: 2022-12-21

## 2022-12-21 RX ORDER — MAGNESIUM OXIDE 400 MG ORAL TABLET 241.3 MG
800 TABLET ORAL ONCE
Refills: 0 | Status: COMPLETED | OUTPATIENT
Start: 2022-12-21 | End: 2022-12-21

## 2022-12-21 RX ORDER — OXYCODONE HYDROCHLORIDE 5 MG/1
1 TABLET ORAL
Qty: 0 | Refills: 0 | DISCHARGE
Start: 2022-12-21

## 2022-12-21 RX ORDER — METFORMIN HYDROCHLORIDE 850 MG/1
1 TABLET ORAL
Qty: 0 | Refills: 0 | DISCHARGE

## 2022-12-21 RX ORDER — SENNA PLUS 8.6 MG/1
2 TABLET ORAL
Qty: 0 | Refills: 0 | DISCHARGE
Start: 2022-12-21

## 2022-12-21 RX ORDER — POLYETHYLENE GLYCOL 3350 17 G/17G
17 POWDER, FOR SOLUTION ORAL
Qty: 0 | Refills: 0 | DISCHARGE
Start: 2022-12-21

## 2022-12-21 RX ORDER — CLOPIDOGREL BISULFATE 75 MG/1
1 TABLET, FILM COATED ORAL
Qty: 0 | Refills: 0 | DISCHARGE
Start: 2022-12-21

## 2022-12-21 RX ORDER — PANTOPRAZOLE SODIUM 20 MG/1
1 TABLET, DELAYED RELEASE ORAL
Qty: 0 | Refills: 0 | DISCHARGE
Start: 2022-12-21

## 2022-12-21 RX ADMIN — Medication 2: at 07:04

## 2022-12-21 RX ADMIN — Medication 650 MILLIGRAM(S): at 12:00

## 2022-12-21 RX ADMIN — Medication 37.5 MILLIGRAM(S): at 05:43

## 2022-12-21 RX ADMIN — Medication 650 MILLIGRAM(S): at 11:34

## 2022-12-21 RX ADMIN — Medication 100 MICROGRAM(S): at 05:43

## 2022-12-21 RX ADMIN — MAGNESIUM OXIDE 400 MG ORAL TABLET 800 MILLIGRAM(S): 241.3 TABLET ORAL at 07:40

## 2022-12-21 RX ADMIN — Medication 4 UNIT(S): at 12:15

## 2022-12-21 RX ADMIN — CLOPIDOGREL BISULFATE 75 MILLIGRAM(S): 75 TABLET, FILM COATED ORAL at 11:35

## 2022-12-21 RX ADMIN — Medication 4 UNIT(S): at 10:11

## 2022-12-21 RX ADMIN — Medication 4: at 12:15

## 2022-12-21 RX ADMIN — Medication 10 MILLIGRAM(S): at 11:37

## 2022-12-21 RX ADMIN — SODIUM CHLORIDE 3 MILLILITER(S): 9 INJECTION INTRAMUSCULAR; INTRAVENOUS; SUBCUTANEOUS at 15:19

## 2022-12-21 RX ADMIN — OXYCODONE HYDROCHLORIDE 10 MILLIGRAM(S): 5 TABLET ORAL at 02:44

## 2022-12-21 RX ADMIN — Medication 40 MILLIGRAM(S): at 05:43

## 2022-12-21 RX ADMIN — HEPARIN SODIUM 5000 UNIT(S): 5000 INJECTION INTRAVENOUS; SUBCUTANEOUS at 13:34

## 2022-12-21 RX ADMIN — Medication 40 MILLIEQUIVALENT(S): at 07:40

## 2022-12-21 RX ADMIN — HEPARIN SODIUM 5000 UNIT(S): 5000 INJECTION INTRAVENOUS; SUBCUTANEOUS at 05:43

## 2022-12-21 RX ADMIN — Medication 20 MILLIEQUIVALENT(S): at 11:36

## 2022-12-21 RX ADMIN — POLYETHYLENE GLYCOL 3350 17 GRAM(S): 17 POWDER, FOR SOLUTION ORAL at 11:38

## 2022-12-21 RX ADMIN — PANTOPRAZOLE SODIUM 40 MILLIGRAM(S): 20 TABLET, DELAYED RELEASE ORAL at 11:36

## 2022-12-21 RX ADMIN — Medication 0.5 MILLIGRAM(S): at 05:43

## 2022-12-21 RX ADMIN — SODIUM CHLORIDE 3 MILLILITER(S): 9 INJECTION INTRAMUSCULAR; INTRAVENOUS; SUBCUTANEOUS at 06:30

## 2022-12-21 RX ADMIN — Medication 81 MILLIGRAM(S): at 11:35

## 2022-12-21 NOTE — DISCHARGE NOTE PROVIDER - CARE PROVIDER_API CALL
Jeff Garces)  Cardiovascular Surgery  [address] [place] [address]  Phone: [phone]  Fax: [phone]  Follow Up Time: 1 week    Olayinka Maravilla)  Cardiovascular Medicine  [address] [facility], [facility] [address]  [place], [place] [address]  Phone: [phone]  Fax: [phone]  Follow Up Time: 2 weeks

## 2022-12-21 NOTE — DISCHARGE NOTE PROVIDER - NSDCFUADDINST_GEN_ALL_CORE_FT
-Walk daily as tolerated and use your incentive spirometer 10 times every hour while you are awake.     -Please weigh yourself daily. If you notice over a 3 pound weight gain in 3 days, this is a sign you are likely retaining too much fluid. It is imperative you call our right away with unexplained rapid weight gain.      -Please continue to wear the compression stockings given to you in the hospital at home. This is a way to prevent fluid from building up in your legs.     -No driving or strenuous activity/exercise until cleared by your surgeon.    -Gently clean your incisions with unscented/antibacterial soap and water, pat dry.  You may leave them open to air.    -Call your doctor if you have shortness of breath, chest pain not relieved by pain medication, dizziness, fever >101.5, or increased redness or drainage from incisions.    -If you have any questions or concerns, please call [phone]

## 2022-12-21 NOTE — DISCHARGE NOTE PROVIDER - NSDCCPTREATMENT_GEN_ALL_CORE_FT
PRINCIPAL PROCEDURE  Procedure: CABG, with ERIC  Findings and Treatment: OPCABx4 (LIMA-LAD, SVG-Diag, SVG-OM-Diag seq), EF normal

## 2022-12-21 NOTE — DISCHARGE NOTE PROVIDER - PROVIDER TOKENS
PROVIDER:[TOKEN:[[id number]],FOLLOWUP:[1 week]],PROVIDER:[TOKEN:[[id number]],FOLLOWUP:[2 weeks]]

## 2022-12-21 NOTE — DISCHARGE NOTE PROVIDER - NSDCFUADDAPPT_GEN_ALL_CORE_FT
Our office will coordinate follow up appointments with your rehab facility, but if you have any questions, please call [phone]

## 2022-12-21 NOTE — CHART NOTE - NSCHARTNOTEFT_GEN_A_CORE
CT Removal:    Pt seen and examined at bedside.  Case discussed with Dr. Garces.  Minimal output from CTs.  No air leak appreciated.  CT removed without incident per Dr. Garces request.  Occlusive DSD placed.  CXR no obvious PTX noted.  Pt tolerated procedure well.
Ms. Constantino is a 77-year-old female with a past medical history of type 2 diabetes mellitus, hypothyroidism, hyperlipidemia, coronary artery disease (s/p PCI on 2012) and left lung CA (s/p partial lobectomy) who was admitted on 12/14/22 for elective surgery s/p CABG on 12/15/22 (LIMA-LAD, SVG-Diag, SVG-OM-Diag seq). Endocrinology was consulted for recommendations regarding glycemic control. She will be discharged to [facility] later today.     148 mg/dL (12-20 @ 08:35) -> She didn’t receive insulin as she was out for procedure.    251 mg/dL (12-20 @ 12:39) -> 4 units of lispro + 6 units of sliding scale.    123 mg/dL (12-20 @ 17:15) -> 4 units of lispro.   261 mg/dL (12-20 @ 21:36) -> 12 units of lantus + 6 units of lispro.    164 mg/dL (12-21 @ 07:00) -> 2 units of sliding scale.    133 mg/dL (12-21 @ 10:01) -> 4 units of lispro.    227 mg/dL (12-21 @ 12:11) -> 4 units of lispro + 4 units of sliding scale.      # Type 2 diabetes mellitus   - For discharge, she may continue with Lantus 12 units at bedtime and Lispro 8 units before meals.     Case discussed with Dr. Pereira. Primary team updated.     Flo Lemon  Endocrinology Fellow

## 2022-12-21 NOTE — DISCHARGE NOTE PROVIDER - NSDCMRMEDTOKEN_GEN_ALL_CORE_FT
levothyroxine 100 mcg (0.1 mg) oral tablet: 1 tab(s) orally once a day  metFORMIN 500 mg oral tablet: 1 tab(s) orally 3 times a day; as per patient she is supposed to start this med but hasnt started yet  PARoxetine 10 mg oral tablet: 1 tab(s) orally once a day  Repatha SureClick 140 mg/mL subcutaneous solution: subcutaneous every 2 weeks   acetaminophen 325 mg oral tablet: 2 tab(s) orally every 6 hours, As needed, Mild Pain (1 - 3)  aspirin 81 mg oral delayed release tablet: 1 tab(s) orally once a day  clopidogrel 75 mg oral tablet: 1 tab(s) orally once a day  furosemide 40 mg oral tablet: 1 tab(s) orally once a day  levothyroxine 100 mcg (0.1 mg) oral tablet: 1 tab(s) orally once a day  metFORMIN 500 mg oral tablet: 1 tab(s) orally 3 times a day; as per patient she is supposed to start this med but hasnt started yet  metoprolol tartrate 37.5 mg oral tablet: 1 tab(s) orally 2 times a day  oxyCODONE 10 mg oral tablet: 1 tab(s) orally every 6 hours, As needed, Severe Pain (7 - 10)  oxyCODONE 5 mg oral tablet: 1 tab(s) orally every 6 hours, As needed, Moderate Pain (4 - 6)  pantoprazole 40 mg oral delayed release tablet: 1 tab(s) orally once a day  PARoxetine 10 mg oral tablet: 1 tab(s) orally once a day  polyethylene glycol 3350 oral powder for reconstitution: 17 gram(s) orally once a day  potassium chloride 20 mEq oral tablet, extended release: 1 tab(s) orally once a day  Repatha SureClick 140 mg/mL subcutaneous solution: subcutaneous every 2 weeks  senna leaf extract oral tablet: 2 tab(s) orally once a day (at bedtime)   acetaminophen 325 mg oral tablet: 2 tab(s) orally every 6 hours, As needed, Mild Pain (1 - 3)  aspirin 81 mg oral delayed release tablet: 1 tab(s) orally once a day  clopidogrel 75 mg oral tablet: 1 tab(s) orally once a day  furosemide 40 mg oral tablet: 1 tab(s) orally once a day  insulin glargine 100 units/mL subcutaneous solution: 12 unit(s) subcutaneous once a day (at bedtime)  insulin lispro 100 units/mL injectable solution: 8 unit(s) injectable 3 times a day (before meals)  levothyroxine 100 mcg (0.1 mg) oral tablet: 1 tab(s) orally once a day  metoprolol tartrate 37.5 mg oral tablet: 1 tab(s) orally 2 times a day  oxyCODONE 10 mg oral tablet: 1 tab(s) orally every 6 hours, As needed, Severe Pain (7 - 10)  oxyCODONE 5 mg oral tablet: 1 tab(s) orally every 6 hours, As needed, Moderate Pain (4 - 6)  pantoprazole 40 mg oral delayed release tablet: 1 tab(s) orally once a day  PARoxetine 10 mg oral tablet: 1 tab(s) orally once a day  polyethylene glycol 3350 oral powder for reconstitution: 17 gram(s) orally once a day  potassium chloride 20 mEq oral tablet, extended release: 1 tab(s) orally once a day  Repatha SureClick 140 mg/mL subcutaneous solution: subcutaneous every 2 weeks  senna leaf extract oral tablet: 2 tab(s) orally once a day (at bedtime)

## 2022-12-21 NOTE — DISCHARGE NOTE NURSING/CASE MANAGEMENT/SOCIAL WORK - NSDCFUADDAPPT_GEN_ALL_CORE_FT
Our office will coordinate follow up appointments with your rehab facility, but if you have any questions, please call [phone]

## 2022-12-21 NOTE — DISCHARGE NOTE PROVIDER - HOSPITAL COURSE
Patient discussed on morning rounds with Dr. Garces    Operation Date: OPCABx4 (LIMA-LAD, SVG-OM-diag seq, DVG-diag)    Primary Surgeon/Attending MD: Dr. Garces    Referring Physician: Olayinka Maravilla  _ _ _ _ _ _ _ _ _ _ _ _  HOSPITAL COURSE:    76 yo female, former heavy smoker presents with a PMH of HLD, CAD (s/p PTCA in 2012), DM-II, left Lung CA s/p partial lobectomy and Hypothyroidism with class III angina with severe 3 vessel CAD now s/p uncomplicated OPCABx4 with normal EF on 12/15. Patient arrived to the ICU intubated on no gtts. Overnight on POD#0 patient developed a lactic acidosis and low urine output; thought to be in a low flow state. She was started on dobutamine for worsening RV function.  Over POD#1-3 patient was slowly weaned off of the dobutamine. POD#4 patient was started on Lasix and transferred to [facility]. Today patient is continuing diuresis. Patient evaluated by PT. Recommended for home PT vs subacute rehab. Due to lack of help at home patient will go to subacute rehab. POD 5, pt had been having some episodes of agitation but today is a/ox 3 and cooperative so removed 1:1. Pt has no acute complaints today REST OF DISCHARGE    _ _ _ _ _ _ _ _ _ _ _ _  DISCHARGE PHYSICAL EXAM:  General:  Neuro:  Cardio:  Pulm:  GI/:  Vascular:  Extremities:  Incisions:  _ _ _ _ _ _ _ _ _ _ _ _  REMOVAL CHECKLIST:        [ ] Epicardial wires          [ ] Stitches/tie downs,   If no, why? ______          [ ] PICC/Midline,   If no, why? ________  _ _ _ _ _ _ _ _ _ _ _ _   MEDICATION DISCHARGE CHECKLIST    CABG        [ x] Aspirin, [  ] Contraindicated, Reason_______________________________        [ x] Plavix, [  ] Contraindicated, Reason_______________________________        [ ] Statin, [ x ] Contraindicated, Reason___allergy___        [ x] Lasix , [  ] Contraindicated, Reason_______________________________              Duration: _____        [ x] Beta-Blocker, [  ] Contraindicated, Reason_______________________________        _ _ _ _ _ _ _ _ _ _ _ _  RELEVANT LABS/IMAGING:          _ _ _ _ _ _ _ _ _ _ _ _  Over 35 minutes was spent with the patient reviewing the discharge material including medications, follow up appointments, recovery, concerning symptoms, and how to contact their health care providers if they have questions   Patient discussed on morning rounds with Dr. Jluis Rdz.    Operation Date: OPCABx4 (LIMA-LAD, SVG-OM-diag seq, DVG-diag)    Primary Surgeon/Attending MD: Dr. Garces    Referring Physician: Olayinka Maravilla  _ _ _ _ _ _ _ _ _ _ _ _  HOSPITAL COURSE:    76 yo female, former heavy smoker presents with a PMH of HLD, CAD (s/p PTCA in 2012), DM-II, left Lung CA s/p partial lobectomy and Hypothyroidism with class III angina with severe 3 vessel CAD now s/p uncomplicated OPCABx4 with normal EF on 12/15. Patient arrived to the ICU intubated on no gtts. Overnight on POD#0 patient developed a lactic acidosis and low urine output; thought to be in a low flow state. She was started on dobutamine for worsening RV function.  Over POD#1-3 patient was slowly weaned off of the dobutamine. POD#4 patient was started on Lasix and transferred to [facility]. Today patient is continuing diuresis. Patient evaluated by PT. Recommended for home PT vs subacute rehab. Due to lack of help at home patient will go to subacute rehab. POD 5, pt had been having some episodes of agitation but today is a/ox 3 and cooperative so removed 1:1. Pt has no acute complaints today. She will be going to [facility] facility upon DC from hospital. She has been cleared for DC by Dr. Jluis Rdz.     _ _ _ _ _ _ _ _ _ _ _ _  DISCHARGE PHYSICAL EXAM    General: resting comfortably in bed in NAD  Neurological: AOx3. Motor skills grossly intact  Cardiovascular: Normal S1/S2. Regular rate/rhythm. No murmurs  Respiratory: Lungs CTA bilaterally. No wheezing or rales  Gastrointestinal: +BS in all 4 quadrants. Non-distended. Soft. Non-tender  Extremities: Strength 5/5 b/l upper/lower extremities. Sensation grossly intact upper/lower extremities. No edema. No calf tenderness.  Vascular: Radial 2+bilaterally, DP 2+ b/l  Incision Sites: sternal incisions healing well, no erythema or dehisence.   _ _ _ _   _ _ _ _ _ _ _ _  REMOVAL CHECKLIST:        [ x] Epicardial wires-CUT          [ x] Stitches/tie downs,   If no, why? ______          [ x] PICC/Midline,   If no, why? ________  _ _ _ _ _ _ _ _ _ _ _ _   MEDICATION DISCHARGE CHECKLIST    CABG        [ x] Aspirin, [  ] Contraindicated, Reason_______________________________        [ x] Plavix, [  ] Contraindicated, Reason_______________________________        [ ] Statin, [ x ] Contraindicated, Reason___allergy___        [ x] Lasix , [  ] Contraindicated, Reason_______________________________              Duration: _____        [ x] Beta-Blocker, [  ] Contraindicated, Reason_______________________________        _ _ _ _ _ _ _ _ _ _ _ _  RELEVANT LABS/IMAGING:          _ _ _ _ _ _ _ _ _ _ _ _  Over 35 minutes was spent with the patient reviewing the discharge material including medications, follow up appointments, recovery, concerning symptoms, and how to contact their health care providers if they have questions

## 2022-12-22 ENCOUNTER — TRANSCRIPTION ENCOUNTER (OUTPATIENT)
Age: 77
End: 2022-12-22

## 2022-12-22 PROBLEM — K21.9 GASTRO-ESOPHAGEAL REFLUX DISEASE WITHOUT ESOPHAGITIS: Chronic | Status: ACTIVE | Noted: 2022-12-15

## 2022-12-27 ENCOUNTER — TRANSCRIPTION ENCOUNTER (OUTPATIENT)
Age: 77
End: 2022-12-27

## 2022-12-28 DIAGNOSIS — Z85.118 PERSONAL HISTORY OF OTHER MALIGNANT NEOPLASM OF BRONCHUS AND LUNG: ICD-10-CM

## 2022-12-28 DIAGNOSIS — E83.42 HYPOMAGNESEMIA: ICD-10-CM

## 2022-12-28 DIAGNOSIS — E03.9 HYPOTHYROIDISM, UNSPECIFIED: ICD-10-CM

## 2022-12-28 DIAGNOSIS — R57.0 CARDIOGENIC SHOCK: ICD-10-CM

## 2022-12-28 DIAGNOSIS — F32.A DEPRESSION, UNSPECIFIED: ICD-10-CM

## 2022-12-28 DIAGNOSIS — D62 ACUTE POSTHEMORRHAGIC ANEMIA: ICD-10-CM

## 2022-12-28 DIAGNOSIS — Q21.12 PATENT FORAMEN OVALE: ICD-10-CM

## 2022-12-28 DIAGNOSIS — Z88.0 ALLERGY STATUS TO PENICILLIN: ICD-10-CM

## 2022-12-28 DIAGNOSIS — I95.81 POSTPROCEDURAL HYPOTENSION: ICD-10-CM

## 2022-12-28 DIAGNOSIS — R09.02 HYPOXEMIA: ICD-10-CM

## 2022-12-28 DIAGNOSIS — E87.20 ACIDOSIS, UNSPECIFIED: ICD-10-CM

## 2022-12-28 DIAGNOSIS — K21.9 GASTRO-ESOPHAGEAL REFLUX DISEASE WITHOUT ESOPHAGITIS: ICD-10-CM

## 2022-12-28 DIAGNOSIS — I36.1 NONRHEUMATIC TRICUSPID (VALVE) INSUFFICIENCY: ICD-10-CM

## 2022-12-28 DIAGNOSIS — E11.9 TYPE 2 DIABETES MELLITUS WITHOUT COMPLICATIONS: ICD-10-CM

## 2022-12-28 DIAGNOSIS — I25.10 ATHEROSCLEROTIC HEART DISEASE OF NATIVE CORONARY ARTERY WITHOUT ANGINA PECTORIS: ICD-10-CM

## 2022-12-28 DIAGNOSIS — I70.0 ATHEROSCLEROSIS OF AORTA: ICD-10-CM

## 2022-12-28 DIAGNOSIS — I25.119 ATHEROSCLEROTIC HEART DISEASE OF NATIVE CORONARY ARTERY WITH UNSPECIFIED ANGINA PECTORIS: ICD-10-CM

## 2022-12-28 DIAGNOSIS — Z98.61 CORONARY ANGIOPLASTY STATUS: ICD-10-CM

## 2022-12-28 DIAGNOSIS — E78.5 HYPERLIPIDEMIA, UNSPECIFIED: ICD-10-CM

## 2022-12-28 DIAGNOSIS — Z87.891 PERSONAL HISTORY OF NICOTINE DEPENDENCE: ICD-10-CM

## 2022-12-28 DIAGNOSIS — Z90.2 ACQUIRED ABSENCE OF LUNG [PART OF]: ICD-10-CM

## 2023-01-03 ENCOUNTER — APPOINTMENT (OUTPATIENT)
Dept: CARDIOTHORACIC SURGERY | Facility: CLINIC | Age: 78
End: 2023-01-03
Payer: MEDICARE

## 2023-01-03 ENCOUNTER — OUTPATIENT (OUTPATIENT)
Dept: OUTPATIENT SERVICES | Facility: HOSPITAL | Age: 78
LOS: 1 days | End: 2023-01-03
Payer: MEDICARE

## 2023-01-03 VITALS
SYSTOLIC BLOOD PRESSURE: 128 MMHG | RESPIRATION RATE: 18 BRPM | TEMPERATURE: 97 F | DIASTOLIC BLOOD PRESSURE: 77 MMHG | OXYGEN SATURATION: 99 % | BODY MASS INDEX: 21.79 KG/M2 | WEIGHT: 111 LBS | HEART RATE: 74 BPM | HEIGHT: 60 IN

## 2023-01-03 DIAGNOSIS — Z09 ENCOUNTER FOR FOLLOW-UP EXAMINATION AFTER COMPLETED TREATMENT FOR CONDITIONS OTHER THAN MALIGNANT NEOPLASM: ICD-10-CM

## 2023-01-03 DIAGNOSIS — Z90.2 ACQUIRED ABSENCE OF LUNG [PART OF]: Chronic | ICD-10-CM

## 2023-01-03 DIAGNOSIS — Z98.890 OTHER SPECIFIED POSTPROCEDURAL STATES: Chronic | ICD-10-CM

## 2023-01-03 PROCEDURE — 71046 X-RAY EXAM CHEST 2 VIEWS: CPT | Mod: 26

## 2023-01-03 PROCEDURE — 71046 X-RAY EXAM CHEST 2 VIEWS: CPT

## 2023-01-03 PROCEDURE — 99024 POSTOP FOLLOW-UP VISIT: CPT

## 2023-01-03 RX ORDER — METFORMIN HYDROCHLORIDE 500 MG/1
500 TABLET, COATED ORAL 3 TIMES DAILY
Qty: 90 | Refills: 2 | Status: COMPLETED | COMMUNITY
End: 2023-01-03

## 2023-01-03 RX ORDER — GLIMEPIRIDE 1 MG/1
1 TABLET ORAL DAILY
Refills: 0 | Status: COMPLETED | COMMUNITY
End: 2023-01-03

## 2023-01-03 RX ORDER — METOPROLOL SUCCINATE 25 MG/1
25 TABLET, EXTENDED RELEASE ORAL DAILY
Qty: 30 | Refills: 3 | Status: COMPLETED | COMMUNITY
End: 2023-01-03

## 2023-01-04 NOTE — PHYSICAL EXAM
[] : no respiratory distress [Auscultation Breath Sounds / Voice Sounds] : lungs were clear to auscultation bilaterally [Heart Rate And Rhythm] : heart rate was normal and rhythm regular [Heart Sounds] : normal S1 and S2 [Heart Sounds Gallop] : no gallops [Murmurs] : no murmurs [Heart Sounds Pericardial Friction Rub] : no pericardial rub [Clean] : clean [Dry] : dry [Healing Well] : healing well [No Edema] : no edema [FreeTextEntry1] : sternum stable [FreeTextEntry5] : left thigh EVH incision

## 2023-01-04 NOTE — END OF VISIT
[FreeTextEntry3] : I, BUZZ GARCIA , am scribing for and in the presence of WILMA LOVING the following sections: History of present illness, past Medical/family/surgical/family/social history, review of systems, vital signs, physical exam and disposition.\par I personally performed the services described in the documentation, reviewed the documentation recorded by the scribe in my presence and it accurately and completely records my words and actions.\par

## 2023-01-04 NOTE — DISCUSSION/SUMMARY
[Doing Well] : is doing well [1] : 1 [FreeTextEntry1] : Plan: \par \par Continue to increase activity and walk daily as tolerated. Continue to use incentive spirometer. \par No driving or strenuous activity for 4 weeks after surgery. Avoid lifting >10 to 15 lbs.\par Call MD if you experience fever, fatigue, dizziness, confusion, syncope, shortness of breath, chest pain not relieved with analgesics, increased redness/drainage from incision.\par Follow up with Dr. Olayinka Maravilla\par stop plavix in 1 month\par decrease lasix to 20mg daily\par change metoprolol to Succinate 50mg bid\par continue aggressive PT/OT at rehab\par d/c home when medically cleared with visiting nurse, home health aide and PT\par Follow up in CTS clinic as needed.\par \par

## 2023-01-04 NOTE — REASON FOR VISIT
[Formal Caregiver] : formal caregiver [de-identified] : OPCAB x 4 [de-identified] : 12/15/22 [de-identified] : Intraop uncomplicated. Extubated on POD 0.  Overnight on POD#0 patient developed a lactic acidosis and low urine output; thought to be in a low flow state. She was started on dobutamine for worsening RV function. \par Over POD#1-3 patient was slowly weaned off of the dobutamine. POD#4 patient was started on Lasix and transferred to [facility]. Today patient is continuing diuresis. Patient evaluated by PT. Recommended for home PT vs subacute rehab. 12/21 patient discharged to [facility]. \par Patient presents from [facility] in a wheelchair for transport today. She reports participating with PT/OT. \par Chest xray today w/trace mary pleural effusions

## 2023-01-05 ENCOUNTER — TRANSCRIPTION ENCOUNTER (OUTPATIENT)
Age: 78
End: 2023-01-05

## 2023-01-10 ENCOUNTER — TRANSCRIPTION ENCOUNTER (OUTPATIENT)
Age: 78
End: 2023-01-10

## 2023-01-17 ENCOUNTER — TRANSCRIPTION ENCOUNTER (OUTPATIENT)
Age: 78
End: 2023-01-17

## 2023-01-20 ENCOUNTER — TRANSCRIPTION ENCOUNTER (OUTPATIENT)
Age: 78
End: 2023-01-20

## 2023-02-03 ENCOUNTER — LABORATORY RESULT (OUTPATIENT)
Age: 78
End: 2023-02-03

## 2023-02-03 ENCOUNTER — APPOINTMENT (OUTPATIENT)
Dept: NEPHROLOGY | Facility: CLINIC | Age: 78
End: 2023-02-03
Payer: MEDICARE

## 2023-02-03 VITALS — DIASTOLIC BLOOD PRESSURE: 72 MMHG | HEART RATE: 90 BPM | SYSTOLIC BLOOD PRESSURE: 130 MMHG

## 2023-02-03 DIAGNOSIS — N39.0 URINARY TRACT INFECTION, SITE NOT SPECIFIED: ICD-10-CM

## 2023-02-03 DIAGNOSIS — E78.00 PURE HYPERCHOLESTEROLEMIA, UNSPECIFIED: ICD-10-CM

## 2023-02-03 DIAGNOSIS — I10 ESSENTIAL (PRIMARY) HYPERTENSION: ICD-10-CM

## 2023-02-03 DIAGNOSIS — Z95.1 PRESENCE OF AORTOCORONARY BYPASS GRAFT: ICD-10-CM

## 2023-02-03 DIAGNOSIS — E11.9 TYPE 2 DIABETES MELLITUS W/OUT COMPLICATIONS: ICD-10-CM

## 2023-02-03 PROCEDURE — 99204 OFFICE O/P NEW MOD 45 MIN: CPT

## 2023-02-03 NOTE — HISTORY OF PRESENT ILLNESS
[FreeTextEntry1] : Kindly referred by Dr. Jenkins for urinary abnormalities and low bicarb. \par \par CABG x 4 2 weeks ago. Creatinine 0.75. Bicarb improved to 18. K 4.7. \par \par Previous frequent UTIs. 1 - 2 weeks ago developed dysuria. No fevers, chills, back pain. No antibiotic therapy.\par \par * HTN borderline controlled. No lightheadedness. No CP/SOB. Compliant with medications.

## 2023-02-03 NOTE — ASSESSMENT
[FreeTextEntry1] : # Probable UTI.\par * Macrobid. Advised to call immediately with worsening sx, no relief of symptoms, chills, fever, or back pain.\par * Check urine culture. \par \par # HTN controlled.\par * Cont lasix and metoprolol. \par * The patient's blood pressure was checked with the [employer] [id number] using the SPRINT trial protocol after sitting quietly in an empty room with arm supported, back supported, and feet on the floor for 5 minutes. The average of 3 readings were taken.\par * A counseling information sheet on blood pressure and staying healthy has been given (which they have been instructed to read).\par * The patient has been counseled to check their BP at home with an automatic arm cuff, write down the readings, and reach me directly on the phone immediately if they are persistently > 180 systolic or if SBP is less than 100 or if lightheadedness develops. They were counseled to bring in all blood pressure readings and medications next visit.\par * The patient has been counseled that regular office follow-up (at least every 4 months for now)  is important for monitoring and for their health, and that it is their responsibility to make follow up appointments.\par * The patient also has been counseled that they must never stop or change any medications without discussing this with me (or another physician). \par \par

## 2023-02-03 NOTE — PHYSICAL EXAM
[General Appearance - Alert] : alert [General Appearance - In No Acute Distress] : in no acute distress [Neck Appearance] : the appearance of the neck was normal [Neck Cervical Mass (___cm)] : no neck mass was observed [Jugular Venous Distention Increased] : there was no jugular-venous distention [Thyroid Diffuse Enlargement] : the thyroid was not enlarged [Thyroid Nodule] : there were no palpable thyroid nodules [] : no respiratory distress [Auscultation Breath Sounds / Voice Sounds] : lungs were clear to auscultation bilaterally [Heart Rate And Rhythm] : heart rate was normal and rhythm regular [Heart Sounds] : normal S1 and S2 [Heart Sounds Gallop] : no gallops [Murmurs] : no murmurs [Heart Sounds Pericardial Friction Rub] : no pericardial rub [Edema] : there was no peripheral edema [Bowel Sounds] : normal bowel sounds [Abdomen Soft] : soft [Abdomen Tenderness] : non-tender [Abdomen Mass (___ Cm)] : no abdominal mass palpated [Cervical Lymph Nodes Enlarged Posterior Bilaterally] : posterior cervical [Cervical Lymph Nodes Enlarged Anterior Bilaterally] : anterior cervical [Supraclavicular Lymph Nodes Enlarged Bilaterally] : supraclavicular

## 2023-02-04 LAB
CREAT SPEC-SCNC: 116 MG/DL
MICROALBUMIN 24H UR DL<=1MG/L-MCNC: 3.6 MG/DL
MICROALBUMIN/CREAT 24H UR-RTO: 31 MG/G

## 2023-02-05 LAB
APPEARANCE: ABNORMAL
BILIRUBIN URINE: NEGATIVE
BLOOD URINE: NEGATIVE
COLOR: YELLOW
GLUCOSE QUALITATIVE U: NORMAL
KETONES URINE: NEGATIVE
LEUKOCYTE ESTERASE URINE: ABNORMAL
NITRITE URINE: POSITIVE
PH URINE: 8.5
PROTEIN URINE: ABNORMAL
SPECIFIC GRAVITY URINE: 1.02
UROBILINOGEN URINE: NORMAL

## 2023-02-08 LAB — BACTERIA UR CULT: ABNORMAL

## 2023-03-28 NOTE — DISCHARGE NOTE NURSING/CASE MANAGEMENT/SOCIAL WORK - PATIENT PORTAL LINK FT
You can access the FollowMyHealth Patient Portal offered by [facility] by registering at the following website: [email or web address]. By joining [employer] [facility]’s FollowMyHealth portal, you will also be able to view your health information using other applications (apps) compatible with our system. 4 = No assist / stand by assistance

## 2023-05-04 ENCOUNTER — APPOINTMENT (OUTPATIENT)
Dept: NEPHROLOGY | Facility: CLINIC | Age: 78
End: 2023-05-04

## 2023-08-01 ENCOUNTER — APPOINTMENT (OUTPATIENT)
Dept: VASCULAR SURGERY | Facility: CLINIC | Age: 78
End: 2023-08-01

## 2023-08-01 ENCOUNTER — OUTPATIENT (OUTPATIENT)
Dept: OUTPATIENT SERVICES | Facility: HOSPITAL | Age: 78
LOS: 1 days | End: 2023-08-01
Payer: MEDICARE

## 2023-08-01 DIAGNOSIS — R07.9 CHEST PAIN, UNSPECIFIED: ICD-10-CM

## 2023-08-01 DIAGNOSIS — R06.02 SHORTNESS OF BREATH: ICD-10-CM

## 2023-08-01 DIAGNOSIS — Z98.890 OTHER SPECIFIED POSTPROCEDURAL STATES: Chronic | ICD-10-CM

## 2023-08-01 DIAGNOSIS — Z90.2 ACQUIRED ABSENCE OF LUNG [PART OF]: Chronic | ICD-10-CM

## 2023-08-01 LAB — GLUCOSE BLDC GLUCOMTR-MCNC: 113 MG/DL — HIGH (ref 70–99)

## 2023-08-01 PROCEDURE — 82962 GLUCOSE BLOOD TEST: CPT

## 2023-08-01 PROCEDURE — 78452 HT MUSCLE IMAGE SPECT MULT: CPT | Mod: 26,MH

## 2023-08-01 PROCEDURE — A9500: CPT

## 2023-08-01 PROCEDURE — 78452 HT MUSCLE IMAGE SPECT MULT: CPT

## 2023-08-01 PROCEDURE — 93018 CV STRESS TEST I&R ONLY: CPT

## 2023-08-01 PROCEDURE — 93016 CV STRESS TEST SUPVJ ONLY: CPT

## 2023-08-01 PROCEDURE — 93017 CV STRESS TEST TRACING ONLY: CPT

## 2023-09-05 ENCOUNTER — APPOINTMENT (OUTPATIENT)
Dept: VASCULAR SURGERY | Facility: CLINIC | Age: 78
End: 2023-09-05
Payer: MEDICARE

## 2023-09-05 VITALS
WEIGHT: 111 LBS | DIASTOLIC BLOOD PRESSURE: 83 MMHG | HEIGHT: 60 IN | SYSTOLIC BLOOD PRESSURE: 123 MMHG | HEART RATE: 82 BPM | BODY MASS INDEX: 21.79 KG/M2

## 2023-09-05 VITALS
SYSTOLIC BLOOD PRESSURE: 123 MMHG | DIASTOLIC BLOOD PRESSURE: 83 MMHG | HEIGHT: 60 IN | WEIGHT: 109 LBS | HEART RATE: 82 BPM | BODY MASS INDEX: 21.4 KG/M2

## 2023-09-05 DIAGNOSIS — I70.213 ATHEROSCLEROSIS OF NATIVE ARTERIES OF EXTREMITIES WITH INTERMITTENT CLAUDICATION, BILATERAL LEGS: ICD-10-CM

## 2023-09-05 PROCEDURE — 99204 OFFICE O/P NEW MOD 45 MIN: CPT

## 2023-09-05 PROCEDURE — 93925 LOWER EXTREMITY STUDY: CPT

## 2023-09-05 PROCEDURE — 93978 VASCULAR STUDY: CPT

## 2023-09-05 RX ORDER — INSULIN GLARGINE-YFGN 100 [IU]/ML
100 INJECTION, SOLUTION SUBCUTANEOUS AT BEDTIME
Refills: 0 | Status: DISCONTINUED | COMMUNITY
End: 2023-09-05

## 2023-09-05 RX ORDER — LEVOTHYROXINE SODIUM 100 UG/1
100 CAPSULE ORAL DAILY
Refills: 0 | Status: DISCONTINUED | COMMUNITY
End: 2023-09-05

## 2023-09-05 RX ORDER — METOPROLOL TARTRATE 25 MG/1
25 TABLET, FILM COATED ORAL
Qty: 90 | Refills: 0 | Status: DISCONTINUED | COMMUNITY
End: 2023-09-05

## 2023-09-05 RX ORDER — NITROFURANTOIN (MONOHYDRATE/MACROCRYSTALS) 25; 75 MG/1; MG/1
100 CAPSULE ORAL
Qty: 10 | Refills: 0 | Status: DISCONTINUED | COMMUNITY
Start: 2023-02-03 | End: 2023-09-05

## 2023-09-05 RX ORDER — FUROSEMIDE 40 MG/1
40 TABLET ORAL DAILY
Qty: 30 | Refills: 0 | Status: DISCONTINUED | COMMUNITY
End: 2023-09-05

## 2023-09-05 RX ORDER — INSULIN LISPRO 100 [IU]/ML
100 INJECTION, SOLUTION INTRAVENOUS; SUBCUTANEOUS 3 TIMES DAILY
Refills: 0 | Status: DISCONTINUED | COMMUNITY
End: 2023-09-05

## 2023-09-05 RX ORDER — OXYCODONE 5 MG/1
5 TABLET ORAL
Refills: 0 | Status: DISCONTINUED | COMMUNITY
End: 2023-09-05

## 2023-09-05 RX ORDER — CLOPIDOGREL BISULFATE 75 MG/1
75 TABLET, FILM COATED ORAL DAILY
Qty: 1 | Refills: 6 | Status: DISCONTINUED | COMMUNITY
End: 2023-09-05

## 2023-09-06 PROBLEM — I70.213 ATHEROSCLEROSIS OF NATIVE ARTERY OF BOTH LOWER EXTREMITIES WITH INTERMITTENT CLAUDICATION: Status: ACTIVE | Noted: 2023-09-06

## 2023-09-06 RX ORDER — EMPAGLIFLOZIN 25 MG/1
TABLET, FILM COATED ORAL
Refills: 0 | Status: ACTIVE | COMMUNITY

## 2023-09-06 RX ORDER — METFORMIN HYDROCHLORIDE 625 MG/1
TABLET ORAL
Refills: 0 | Status: ACTIVE | COMMUNITY

## 2023-09-06 NOTE — REVIEW OF SYSTEMS
[Leg Claudication] : intermittent leg claudication [As Noted in HPI] : as noted in HPI [Limb Pain] : limb pain [Negative] : Heme/Lymph

## 2023-09-07 NOTE — ADDENDUM
[FreeTextEntry1] : This note was written by Candelario Guillaume, acting as a scribe for Dr. Olamide Tolliver.  I, Dr. Olamide Tolliver, have read and attest that all the information, medical decision-making, and discharge instructions within are true and accurate.  I, Dr. Olamide Tolliver, personally performed the evaluation and management (E/M) services for this new patient.  That E/M includes conducting the initial examination, assessing all conditions, and establishing the plan of care.  Today, my ACP, Candelario Guillaume, was here to observe my evaluation and management services for this patient to be followed going forward.

## 2023-09-07 NOTE — PHYSICAL EXAM
[Normal Thyroid] : the thyroid was normal [Normal Breath Sounds] : Normal breath sounds [Respiratory Effort] : normal respiratory effort [Normal Heart Sounds] : normal heart sounds [Normal Rate and Rhythm] : normal rate and rhythm [0] : left 0 [1+] : left 1+ [No Rash or Lesion] : No rash or lesion [Alert] : alert [Calm] : calm [JVD] : no jugular venous distention  [Ankle Swelling (On Exam)] : not present [Varicose Veins Of Lower Extremities] : not present [] : not present [Purpura] : no purpura  [Petechiae] : no petechiae [Skin Ulcer] : no ulcer [Skin Induration] : no induration [de-identified] : Thin habitus, NAD [de-identified] : NC/AT, anicteric [de-identified] : FROM throughout, strength 5/5x4, no muscle atrophy noted in LEs [de-identified] : Brisk cap refill in b/l feet/toes [de-identified] : Neurosensory/neuromotor grossly intact

## 2023-09-07 NOTE — ASSESSMENT
[FreeTextEntry1] : 77yoF w/PMHx of HTN, HLD, uncontrolled [id number], h/o lung CA, former smoker, referred by Dr. Olayinka Maravilla for evaluation of her b/l LE leg pain w/walking.  Pt states that she can rarely walk more than 1 block before her calf muscles tighten up and cramp, R>>L.  She denies rest pain or tissue loss in the limbs,but reports that it has been increasingly difficult for her to travel outside the home and perform most daily activities due to her leg pain.  Ms. Constantino is s/p CABG x 4 in 12/2022, currently c/w all meds including metformin, jardiance, repatha, pantoprazole.  Legs thin but well-perfused, no evidence of ischemic changes.  Aorto-iliac and b/l LE arterial duplex studies performed to evaluate LE perfusion demonstrates widely patent aorto-iliac vessels, L dSFA and R mSFA >65% stenosis noted on duplex, runoff to both feet noted via HEATHER/peroneal arteries.  As pt reports severe symptoms that limit her activity, will discuss options for revascularization w/Dr. Maravilla and plan for R, then LLE angio/PTA/stent in the cath lab in a few weeks.  Will need to hold Jardiance 3d prior to procedure, and check recent bloodwork/EKG.

## 2023-09-07 NOTE — PROCEDURE
[FreeTextEntry1] : Aorto-iliac and b/l LE arterial duplex studies performed to evaluate LE perfusion demonstrates widely patent aorto-iliac vessels, L dSFA and R mSFA >65% stenosis noted on duplex, runoff to both feet noted via HEATHER/peroneal arteries.

## 2023-09-17 ENCOUNTER — TRANSCRIPTION ENCOUNTER (OUTPATIENT)
Age: 78
End: 2023-09-17

## 2023-09-18 ENCOUNTER — INPATIENT (INPATIENT)
Facility: HOSPITAL | Age: 78
LOS: 0 days | Discharge: HOME | DRG: 254 | End: 2023-09-19
Attending: SURGERY | Admitting: SURGERY
Payer: COMMERCIAL

## 2023-09-18 ENCOUNTER — APPOINTMENT (OUTPATIENT)
Dept: VASCULAR SURGERY | Facility: HOSPITAL | Age: 78
End: 2023-09-18

## 2023-09-18 ENCOUNTER — TRANSCRIPTION ENCOUNTER (OUTPATIENT)
Age: 78
End: 2023-09-18

## 2023-09-18 VITALS
OXYGEN SATURATION: 95 % | SYSTOLIC BLOOD PRESSURE: 128 MMHG | HEART RATE: 68 BPM | DIASTOLIC BLOOD PRESSURE: 71 MMHG | RESPIRATION RATE: 18 BRPM

## 2023-09-18 DIAGNOSIS — Z98.890 OTHER SPECIFIED POSTPROCEDURAL STATES: Chronic | ICD-10-CM

## 2023-09-18 DIAGNOSIS — Z90.2 ACQUIRED ABSENCE OF LUNG [PART OF]: Chronic | ICD-10-CM

## 2023-09-18 LAB
GLUCOSE BLDC GLUCOMTR-MCNC: 108 MG/DL — HIGH (ref 70–99)
GLUCOSE BLDC GLUCOMTR-MCNC: 153 MG/DL — HIGH (ref 70–99)
GLUCOSE BLDC GLUCOMTR-MCNC: 213 MG/DL — HIGH (ref 70–99)
ISTAT ACTK (ACTIVATED CLOTTING TIME KAOLIN): 143 SEC — HIGH (ref 74–137)
ISTAT ACTK (ACTIVATED CLOTTING TIME KAOLIN): 161 SEC — HIGH (ref 74–137)

## 2023-09-18 PROCEDURE — 75710 ARTERY X-RAYS ARM/LEG: CPT | Mod: 26,GC,59

## 2023-09-18 PROCEDURE — 76937 US GUIDE VASCULAR ACCESS: CPT | Mod: 26,GC

## 2023-09-18 PROCEDURE — 37226: CPT | Mod: GC,RT

## 2023-09-18 PROCEDURE — 36140 INTRO NDL ICATH UPR/LXTR ART: CPT | Mod: 59

## 2023-09-18 PROCEDURE — 75625 CONTRAST EXAM ABDOMINL AORTA: CPT | Mod: 26,GC

## 2023-09-18 RX ORDER — LEVOTHYROXINE SODIUM 125 MCG
1 TABLET ORAL
Qty: 0 | Refills: 0 | DISCHARGE

## 2023-09-18 RX ORDER — EVOLOCUMAB 140 MG/ML
0 INJECTION, SOLUTION SUBCUTANEOUS
Qty: 0 | Refills: 0 | DISCHARGE

## 2023-09-18 RX ORDER — INSULIN LISPRO 100/ML
VIAL (ML) SUBCUTANEOUS EVERY 6 HOURS
Refills: 0 | Status: DISCONTINUED | OUTPATIENT
Start: 2023-09-18 | End: 2023-09-18

## 2023-09-18 RX ORDER — DEXTROSE 50 % IN WATER 50 %
15 SYRINGE (ML) INTRAVENOUS ONCE
Refills: 0 | Status: DISCONTINUED | OUTPATIENT
Start: 2023-09-18 | End: 2023-09-19

## 2023-09-18 RX ORDER — VALSARTAN 80 MG/1
1 TABLET ORAL
Refills: 0 | DISCHARGE

## 2023-09-18 RX ORDER — SODIUM CHLORIDE 9 MG/ML
1000 INJECTION, SOLUTION INTRAVENOUS
Refills: 0 | Status: DISCONTINUED | OUTPATIENT
Start: 2023-09-18 | End: 2023-09-19

## 2023-09-18 RX ORDER — GLUCAGON INJECTION, SOLUTION 0.5 MG/.1ML
1 INJECTION, SOLUTION SUBCUTANEOUS ONCE
Refills: 0 | Status: DISCONTINUED | OUTPATIENT
Start: 2023-09-18 | End: 2023-09-19

## 2023-09-18 RX ORDER — CLOPIDOGREL BISULFATE 75 MG/1
75 TABLET, FILM COATED ORAL AT BEDTIME
Refills: 0 | Status: DISCONTINUED | OUTPATIENT
Start: 2023-09-18 | End: 2023-09-19

## 2023-09-18 RX ORDER — DEXTROSE 50 % IN WATER 50 %
25 SYRINGE (ML) INTRAVENOUS ONCE
Refills: 0 | Status: DISCONTINUED | OUTPATIENT
Start: 2023-09-18 | End: 2023-09-18

## 2023-09-18 RX ORDER — SODIUM CHLORIDE 9 MG/ML
1000 INJECTION INTRAMUSCULAR; INTRAVENOUS; SUBCUTANEOUS
Refills: 0 | Status: DISCONTINUED | OUTPATIENT
Start: 2023-09-18 | End: 2023-09-19

## 2023-09-18 RX ORDER — DEXTROSE 50 % IN WATER 50 %
25 SYRINGE (ML) INTRAVENOUS ONCE
Refills: 0 | Status: DISCONTINUED | OUTPATIENT
Start: 2023-09-18 | End: 2023-09-19

## 2023-09-18 RX ORDER — DEXTROSE 50 % IN WATER 50 %
12.5 SYRINGE (ML) INTRAVENOUS ONCE
Refills: 0 | Status: DISCONTINUED | OUTPATIENT
Start: 2023-09-18 | End: 2023-09-19

## 2023-09-18 RX ORDER — INSULIN LISPRO 100/ML
VIAL (ML) SUBCUTANEOUS
Refills: 0 | Status: DISCONTINUED | OUTPATIENT
Start: 2023-09-18 | End: 2023-09-19

## 2023-09-18 RX ORDER — HEPARIN SODIUM 5000 [USP'U]/ML
5000 INJECTION INTRAVENOUS; SUBCUTANEOUS EVERY 12 HOURS
Refills: 0 | Status: DISCONTINUED | OUTPATIENT
Start: 2023-09-18 | End: 2023-09-19

## 2023-09-18 RX ORDER — CHLORHEXIDINE GLUCONATE 213 G/1000ML
1 SOLUTION TOPICAL ONCE
Refills: 0 | Status: DISCONTINUED | OUTPATIENT
Start: 2023-09-18 | End: 2023-09-19

## 2023-09-18 RX ORDER — LOSARTAN POTASSIUM 100 MG/1
1 TABLET, FILM COATED ORAL
Refills: 0 | DISCHARGE

## 2023-09-18 RX ORDER — ASPIRIN/CALCIUM CARB/MAGNESIUM 324 MG
81 TABLET ORAL DAILY
Refills: 0 | Status: DISCONTINUED | OUTPATIENT
Start: 2023-09-18 | End: 2023-09-19

## 2023-09-18 RX ADMIN — HEPARIN SODIUM 5000 UNIT(S): 5000 INJECTION INTRAVENOUS; SUBCUTANEOUS at 21:38

## 2023-09-18 RX ADMIN — Medication 2: at 22:18

## 2023-09-18 RX ADMIN — Medication 4: at 10:16

## 2023-09-18 RX ADMIN — Medication 81 MILLIGRAM(S): at 10:13

## 2023-09-18 RX ADMIN — CLOPIDOGREL BISULFATE 75 MILLIGRAM(S): 75 TABLET, FILM COATED ORAL at 10:13

## 2023-09-18 RX ADMIN — SODIUM CHLORIDE 50 MILLILITER(S): 9 INJECTION INTRAMUSCULAR; INTRAVENOUS; SUBCUTANEOUS at 18:23

## 2023-09-18 NOTE — H&P ADULT - NSICDXPASTMEDICALHX_GEN_ALL_CORE_FT
PAST MEDICAL HISTORY:  Diabetes mellitus     GERD (gastroesophageal reflux disease)     Hyperlipidemia     Hypothyroidism     Lung cancer

## 2023-09-18 NOTE — DISCHARGE NOTE PROVIDER - NSDCMRMEDTOKEN_GEN_ALL_CORE_FT
aspirin 81 mg oral delayed release tablet: 1 tab(s) orally once a day  Jardiance 10 mg oral tablet: 1 orally  metFORMIN 500 mg oral tablet: 500 milligram(s) orally 2 times a day  pantoprazole 40 mg oral delayed release tablet: 1 tab(s) orally once a day  Repatha SureClick 140 mg/mL subcutaneous solution: subcutaneous every 2 weeks  valsartan 40 mg oral tablet: 1 orally 2 times a day   aspirin 81 mg oral delayed release tablet: 1 tab(s) orally once a day  clopidogrel 75 mg oral tablet: 1 tab(s) orally once a day (at bedtime)  Jardiance 10 mg oral tablet: 1 orally  metFORMIN 500 mg oral tablet: 500 milligram(s) orally 2 times a day  pantoprazole 40 mg oral delayed release tablet: 1 tab(s) orally once a day  Repatha SureClick 140 mg/mL subcutaneous solution: subcutaneous every 2 weeks  valsartan 40 mg oral tablet: 1 orally 2 times a day   aspirin 81 mg oral delayed release tablet: 1 tab(s) orally once a day  Jardiance 10 mg oral tablet: 1 orally  metFORMIN 500 mg oral tablet: 500 milligram(s) orally 2 times a day  pantoprazole 40 mg oral delayed release tablet: 1 tab(s) orally once a day  Plavix 75 mg oral tablet: 1 tab(s) orally once a day (at bedtime) MDD: 1  Repatha SureClick 140 mg/mL subcutaneous solution: subcutaneous every 2 weeks  valsartan 40 mg oral tablet: 1 orally 2 times a day

## 2023-09-18 NOTE — H&P ADULT - NSICDXPASTSURGICALHX_GEN_ALL_CORE_FT
PAST SURGICAL HISTORY:  S/P knee surgery fractured right patella    S/P partial lobectomy of lung left 2012

## 2023-09-18 NOTE — H&P ADULT - NSHPPHYSICALEXAM_GEN_ALL_CORE
Constitutional: Calm, pleasant, conversational  Cardiac: NSR  Respiratory: Equal and bilateral chest rise  Abdomen: Soft, NT, ND  Extremities: WWP  Vascular: RLE: Palpable/triphasic DP, bi/triphasic PT. LLE: Palpable DP/biphasic PT

## 2023-09-18 NOTE — DISCHARGE NOTE PROVIDER - NSDCCPCAREPLAN_GEN_ALL_CORE_FT
PRINCIPAL DISCHARGE DIAGNOSIS  Diagnosis: Superficial femoral artery occlusion  Assessment and Plan of Treatment:       SECONDARY DISCHARGE DIAGNOSES  Diagnosis: Hyperlipidemia, unspecified  Assessment and Plan of Treatment: Community Status: Active    Diagnosis: Personal history of other diseases of the circulatory system  Assessment and Plan of Treatment: Community Status: Active    Diagnosis: Type 2 diabetes mellitus without complications  Assessment and Plan of Treatment: Community Status: Active

## 2023-09-18 NOTE — DISCHARGE NOTE PROVIDER - NSDCCPTREATMENT_GEN_ALL_CORE_FT
PRINCIPAL PROCEDURE  Procedure: Angiogram, extremity, right  Findings and Treatment:      PRINCIPAL PROCEDURE  Procedure: Angio fluoro artery of extremity lower and insertion of stent  Findings and Treatment:       SECONDARY PROCEDURE  Procedure: Angiogram, extremity, right  Findings and Treatment:

## 2023-09-18 NOTE — H&P ADULT - ASSESSMENT
Assessment  Ms. Constantino is a 77F with PMH of HTN, HLD, T2DM, former smoking, CAD s/p 4v CABG, anxiety/depression, presenting for severe 1 block claudication of her bilateral lower extremities. Reports more severe symptoms in her right leg than her left leg. No rest pain or tissue loss. She is now s/p RLE angiogram with stenting of the SFA and is being admitted postoperatively for monitoring    Plan  #PAD  - Now s/p angioplasty and stent of the R SFA  - Continue on DAPT postop (Plavix is a new medication)    #T2DM  - ISS    #HTN  - Holding home valsartan

## 2023-09-18 NOTE — DISCHARGE NOTE PROVIDER - PROVIDER TOKENS
PROVIDER:[TOKEN:[[id number]],FOLLOWUP:[1 week]] PROVIDER:[TOKEN:[[id number]],FOLLOWUP:[1 month]]

## 2023-09-18 NOTE — DISCHARGE NOTE PROVIDER - CARE PROVIDER_API CALL
Olamide Tolliver  Vascular Surgery  [address]  [place] [address]  Phone: [phone]  Fax: [phone]  Follow Up Time: 1 week   Olamide Tolliver  Vascular Surgery  [address]  [place] [address]  Phone: [phone]  Fax: [phone]  Follow Up Time: 1 month

## 2023-09-18 NOTE — DISCHARGE NOTE PROVIDER - NSDCFUADDINST_GEN_ALL_CORE_FT
FOLLOW UP: Dr. Tolliver in 1 week.   Your appointment has been made for _______. Call the office at  with any questions  WOUND CARE: You may shower; soap and water over incision sites. Do not scrub. Pat dry when done.   ACTIVITY: Ambulate as tolerated, but no heavy lifting (>10lbs) or strenuous exercise.  DIET: You may resume regular diet, carb controlled  Call the office if you experience increasing pain, redness, swelling or drainage from incision sites/wounds, or temperature >101.4F.   NEW MEDICATIONS: Plavix  DISCHARGE DESTINATION: Home   FOLLOW UP: Dr. Tolliver in 4 week.   Your appointment has been made for _______. Call the office at  with any questions  WOUND CARE: You may shower; soap and water over incision sites. Do not scrub. Pat dry when done. You have a dressing over your groin. It may come off any time in the 24 hours after discharge  ACTIVITY: Ambulate as tolerated, but no heavy lifting (>10lbs) or strenuous exercise.  DIET: You may resume regular diet, carb controlled  Call the office if you experience increasing pain, redness, swelling or drainage from incision sites/wounds, or temperature >101.4F.   NEW MEDICATIONS: Plavix 75mg at bedtime  DISCHARGE DESTINATION: Home   FOLLOW UP: Dr. Tolliver in 4 week.   Your appointment has been made for October 17th at 10am. Call the office at  with any questions  WOUND CARE: You may shower; soap and water over incision sites. Do not scrub. Pat dry when done. You have a dressing over your groin. It may come off any time in the 24 hours after discharge  ACTIVITY: Ambulate as tolerated, but no heavy lifting (>10lbs) or strenuous exercise.  DIET: You may resume regular diet, carb controlled  Call the office if you experience increasing pain, redness, swelling or drainage from incision sites/wounds, or temperature >101.4F.   NEW MEDICATIONS: Plavix 75mg at bedtime  DISCHARGE DESTINATION: Home   FOLLOW UP: Dr. Tolliver in 4 week.   Your appointment has been made for October 17th at 10am. Call the office at  with any questions  WOUND CARE: You may shower; soap and water over incision sites. Do not scrub. Pat dry when done. You have a dressing over your groin. It may come off any time in the 24 hours after discharge  ACTIVITY: Ambulate as tolerated, but no heavy lifting (>10lbs) or strenuous exercise.  DIET: You may resume regular diet, carb controlled  Call the office if you experience increasing pain, redness, swelling or drainage from incision sites/wounds, or temperature >101.4F.   NEW MEDICATIONS: Plavix 75mg at bedtime  DISCHARGE DESTINATION: Home    Patient has requested contact information for [facility]: [phone]

## 2023-09-18 NOTE — CHART NOTE - NSCHARTNOTEFT_GEN_A_CORE
Vascular Surgery Post-Op Note    Procedure: RLE angiogram, angioplasty SFA, stent SFA    Diagnosis/Indication: RLE claudication    Surgeon: Unruly    S: Pt has no complaints. Denies CP, SOB, WRIGHT, calf tenderness, groin pain.  Pain controlled.  Has been laying flat for 4 hours, hungry and asking for food.     O:  T(C): --  T(F): --  HR: 68 (09-18-23 @ 12:23) (68 - 68)  BP: 128/71 (09-18-23 @ 12:23) (128/71 - 128/71)  RR: 18 (09-18-23 @ 12:23) (18 - 18)  SpO2: 95% (09-18-23 @ 12:23) (95% - 95%)  Wt(kg): --      Gen: NAD, resting comfortably in bed  C/V: NSR  Pulm: Nonlabored breathing, no respiratory distress  Abd: soft, NT/ND. L groin soft, non-tender. No signs of hematoma. No fluid collection appreciated on palpation.   Extrem: WWP, no calf edema, SCDs in place  RLE: PT and DP doppler signals. Strength 5/5. Sensation intact  LLE: Palpable PT and DP. Strength 5/5. Sensation intact      A/P: 77yFemale s/p above procedure  Diet: Regular carb controlled  IVF: None  Pain/nausea control  Dispo plan: Home 9/19

## 2023-09-18 NOTE — DISCHARGE NOTE PROVIDER - HOSPITAL COURSE
Patient is a 77F with PMH of HTN, HLD, T2DM, former smoking, CAD s/p 4v CABG, anxiety/depression, who presented for severe 1 block claudication of her bilateral lower extremities. Reported more severe symptoms in her right leg than her left leg. No rest pain or tissue loss. She is now s/p RLE angiogram and stenting of the SFA 9/18 and was admitted postoperatively for monitoring. Postoperative course was uncomplicated, on bed rest for 4 hours. Patient's access sheath removed post-operatively without issue. No hematoma noted. After 4 hours post-procedure patient was started on regular diet without issue. -n/-v. Patient was able to ambulate without issue. Pain well controlled. Still without hematoma.   POD1 patient was hemodynamically stable reaching all milestones as described above. Groin access site was soft, patient stable for discharge. Patient is a 77F with PMH of HTN, HLD, T2DM, former smoking, CAD s/p 4v CABG, anxiety/depression, who presented for severe 1 block claudication of her bilateral lower extremities. Reported more severe symptoms in her right leg than her left leg. No rest pain or tissue loss. She is now s/p RLE angiogram and stenting of the SFA 9/18 and was admitted postoperatively for monitoring. Postoperative course was uncomplicated, on bed rest for 4 hours. Patient's access sheath removed post-operatively without issue. No hematoma noted. After 4 hours post-procedure patient was started on regular diet CC without issue. -n/-v. Patient was able to ambulate without issue. Pain well controlled. Still without hematoma.   POD1 patient was hemodynamically stable reaching all milestones as described above. Groin access site was soft, patient stable for discharge.

## 2023-09-18 NOTE — BRIEF OPERATIVE NOTE - OPERATION/FINDINGS
Local anesthesia. L CFA access. Max sheath size 6F. Angiogram shows patent aortoiliac disease, SFA focal stenoses at proximal and distal segments, and 2 vessel runoff to the foot via AT and peroneal to PT branch. SFA distal stenotic lesion treated with InPACT DCB. SFA proximal lesion ballooned with 6mm [place] balloon, however completion angiography demonstrated dissection flap so was treated with a [place]il[place] PTX stent. Completion angiography demonstrated resolution. No complications.

## 2023-09-18 NOTE — DISCHARGE NOTE PROVIDER - NSDCFUSCHEDAPPT_GEN_ALL_CORE_FT
Olamide Tolliver  [facility]  VASCULAR [address]  Scheduled Appointment: 10/17/2023

## 2023-09-18 NOTE — PATIENT PROFILE ADULT - FUNCTIONAL ASSESSMENT - BASIC MOBILITY 5.
SUBJECTIVE:   Linnette Bhatt is a 30 year old female       here for annual well woman exam. Pt self d/c'ed use of Lo loestrin OCPs this weekend in order to attempt conception. Pt has started use of prenatal vitamins.     Patient's last menstrual period was 2020 (within days).      Pap smear: Negative for intraepithelial lesion or malignancy 2019   Mammogram: not indicated      OB History    Para Term  AB Living   1 1 1 0 0 1   SAB TAB Ectopic Molar Multiple Live Births   0 0 0 0 0 1       Current Outpatient Medications   Medication Sig Dispense Refill   • venlafaxine (EFFEXOR) 75 MG tablet Take 103 mg by mouth daily after a meal.     • norethindrone-eth estradiol & eth estradiol & ferrous fum (LO LOESTRIN FE) 1 MG-10 MCG / 10 MCG tablet Take 1 tablet by mouth daily. 84 tablet 3   • eflornithine 13.9 % cream Apply topically 2 times daily. 45 g 6   • citalopram (CELEXA) 20 MG tablet        No current facility-administered medications for this visit.        ALLERGIES:   Allergen Reactions   • Penicillins Other (See Comments)     Unknown       Past Medical History:   Diagnosis Date   • IBS (irritable bowel syndrome)    • Pap smear abnormality of cervix/human papillomavirus (HPV) positive    • Vaginal delivery    • Vulvodynia        Past Surgical History:   Procedure Laterality Date   • Vaginal delivery  2017       Family History   Problem Relation Age of Onset   • Heart disease Paternal Grandfather    • Diabetes Paternal Grandfather        Social History     Socioeconomic History   • Marital status: /Civil Union     Spouse name: Not on file   • Number of children: Not on file   • Years of education: Not on file   • Highest education level: Not on file   Occupational History   • Not on file   Social Needs   • Financial resource strain: Not on file   • Food insecurity:     Worry: Not on file     Inability: Not on file   • Transportation needs:     Medical: Not on file      Non-medical: Not on file   Tobacco Use   • Smoking status: Never Smoker   • Smokeless tobacco: Never Used   Substance and Sexual Activity   • Alcohol use: Yes   • Drug use: Never   • Sexual activity: Yes     Partners: Male     Birth control/protection: None   Lifestyle   • Physical activity:     Days per week: Not on file     Minutes per session: Not on file   • Stress: Not on file   Relationships   • Social connections:     Talks on phone: Not on file     Gets together: Not on file     Attends [employer] service: Not on file     Active member of club or organization: Not on file     Attends meetings of clubs or organizations: Not on file     Relationship status: Not on file   • Intimate partner violence:     Fear of current or ex partner: Not on file     Emotionally abused: Not on file     Physically abused: Not on file     Forced sexual activity: Not on file   Other Topics Concern   • Not on file   Social History Narrative   • Not on file       Review of Systems   Constitutional: Negative.    HENT: Negative.    Eyes: Negative.    Respiratory: Negative.    Cardiovascular: Negative.    Gastrointestinal: Negative.    Endocrine: Negative.    Genitourinary: Negative.    Musculoskeletal: Negative.    Skin: Negative.    Allergic/Immunologic: Negative.    Neurological: Negative.    Hematological: Negative.    Psychiatric/Behavioral: Negative.            OBJECTIVE:  Visit Vitals  BP 92/60   Ht 5' 3\" (1.6 m)   Wt 50.9 kg (112 lb 3.2 oz)   LMP 05/25/2020 (Within Days)   BMI 19.88 kg/m²     Physical Exam  Constitutional:       Appearance: Normal appearance. She is normal weight.   Genitourinary:      Pelvic exam was performed with patient in the lithotomy position.      Vulva, vagina and rectum normal.      No posterior fourchette tenderness, injury, rash or lesion present.      Bladder is not tender.      No signs of injury or lesions in the vagina.      No vaginal discharge, erythema, tenderness, bleeding, ulceration,  atrophy or prolapse.      No foreign body in the vagina.      Cervix is parous.      No cervical motion tenderness, discharge, friability, lesion, erythema, bleeding, polyp or eversion.      Uterus is mobile.      Uterus is not enlarged.      Right and left adnexa are non-palpable.   HENT:      Head: Normocephalic and atraumatic.   Eyes:      Extraocular Movements: Extraocular movements intact.      Conjunctiva/sclera: Conjunctivae normal.      Pupils: Pupils are equal, round, and reactive to light.   Neck:      Musculoskeletal: Normal range of motion and neck supple.   Cardiovascular:      Pulses: Normal pulses.   Pulmonary:      Effort: Pulmonary effort is normal.   Chest:      Breasts: Breasts are symmetrical.         Right: Normal.         Left: Normal.   Abdominal:      General: Abdomen is flat. There is no distension.      Palpations: Abdomen is soft. There is no mass.      Tenderness: There is no abdominal tenderness. There is no guarding or rebound.   Musculoskeletal: Normal range of motion.   Lymphadenopathy:      Upper Body:      Right upper body: No axillary adenopathy.      Left upper body: No axillary adenopathy.   Neurological:      General: No focal deficit present.      Mental Status: She is alert and oriented to person, place, and time. Mental status is at baseline.   Skin:     General: Skin is warm and dry.   Psychiatric:         Mood and Affect: Mood normal.         Behavior: Behavior normal.         Thought Content: Thought content normal.         Judgment: Judgment normal.   Vitals signs reviewed.       Problem List Items Addressed This Visit        Other    Cervical high risk HPV (human papillomavirus) test positive     -6/2017-->pap smear: negative cytology with positive high risk HPV   -6/2018-->pap smear: negative cytology and negative high risk HPV  -6/2019-->pap smear: negative cytology but HPV not performed due to reflex order          Cervical cancer screening    Relevant Orders     THINPREP PAP TEST WITH HPV REGARDLESS    Encounter for annual routine gynecological examination - Primary     -Current ASCCP cervical cancer screening guidelines reviewed. Last pap smear performed in 6/2019 but due to history of high risk HPV infection in 2017, will repeat pap smear today  -screening mammogram due at age 38  -continue regular exercise and healthy dietary practices encouraged  -RTC in 1 yr for next annual exam            Encounter for preconception consultation     -Pt with concerns about conception during this time of COVID 19 pandemic.  Discussed with pt that despite novelty of COVID 19 infection, limited research has not found increase risk of congenital abnormality with COVID 19 infection. Pt encouraged to continue social distancing, handwashing and using masks while in public  -Pt to continue use of prenatal vitamins  -Pt informed of possible delay in conception with use of chronic OCPs and low BMI. Pt encouraged to maintain current weight                        Chandrika Givens MD          3 = A little assistance

## 2023-09-18 NOTE — PATIENT PROFILE ADULT - FALL HARM RISK - HARM RISK INTERVENTIONS

## 2023-09-18 NOTE — H&P ADULT - HISTORY OF PRESENT ILLNESS
Ms. Constantino is a 77F with PMH of HTN, HLD, T2DM, former smoking, CAD s/p 4v CABG, anxiety/depression, presenting for severe 1 block claudication of her bilateral lower extremities. Reports more severe symptoms in her right leg than her left leg. No rest pain or tissue loss. She is now s/p RLE angiogram with stenting of the SFA and is being admitted postoperatively for monitoring.

## 2023-09-19 ENCOUNTER — TRANSCRIPTION ENCOUNTER (OUTPATIENT)
Age: 78
End: 2023-09-19

## 2023-09-19 VITALS
HEART RATE: 65 BPM | RESPIRATION RATE: 18 BRPM | SYSTOLIC BLOOD PRESSURE: 141 MMHG | DIASTOLIC BLOOD PRESSURE: 68 MMHG | OXYGEN SATURATION: 95 %

## 2023-09-19 LAB
ANION GAP SERPL CALC-SCNC: 8 MMOL/L — SIGNIFICANT CHANGE UP (ref 5–17)
BUN SERPL-MCNC: 12 MG/DL — SIGNIFICANT CHANGE UP (ref 7–23)
CALCIUM SERPL-MCNC: 8.5 MG/DL — SIGNIFICANT CHANGE UP (ref 8.4–10.5)
CHLORIDE SERPL-SCNC: 108 MMOL/L — SIGNIFICANT CHANGE UP (ref 96–108)
CO2 SERPL-SCNC: 25 MMOL/L — SIGNIFICANT CHANGE UP (ref 22–31)
CREAT SERPL-MCNC: 0.66 MG/DL — SIGNIFICANT CHANGE UP (ref 0.5–1.3)
EGFR: 89 ML/MIN/1.73M2 — SIGNIFICANT CHANGE UP
GLUCOSE BLDC GLUCOMTR-MCNC: 115 MG/DL — HIGH (ref 70–99)
GLUCOSE SERPL-MCNC: 135 MG/DL — HIGH (ref 70–99)
HCT VFR BLD CALC: 40.2 % — SIGNIFICANT CHANGE UP (ref 34.5–45)
HGB BLD-MCNC: 13.3 G/DL — SIGNIFICANT CHANGE UP (ref 11.5–15.5)
MAGNESIUM SERPL-MCNC: 1.8 MG/DL — SIGNIFICANT CHANGE UP (ref 1.6–2.6)
MCHC RBC-ENTMCNC: 28.8 PG — SIGNIFICANT CHANGE UP (ref 27–34)
MCHC RBC-ENTMCNC: 33.1 GM/DL — SIGNIFICANT CHANGE UP (ref 32–36)
MCV RBC AUTO: 87 FL — SIGNIFICANT CHANGE UP (ref 80–100)
NRBC # BLD: 0 /100 WBCS — SIGNIFICANT CHANGE UP (ref 0–0)
PHOSPHATE SERPL-MCNC: 2.7 MG/DL — SIGNIFICANT CHANGE UP (ref 2.5–4.5)
PLATELET # BLD AUTO: 187 K/UL — SIGNIFICANT CHANGE UP (ref 150–400)
POTASSIUM SERPL-MCNC: 3.4 MMOL/L — LOW (ref 3.5–5.3)
POTASSIUM SERPL-SCNC: 3.4 MMOL/L — LOW (ref 3.5–5.3)
RBC # BLD: 4.62 M/UL — SIGNIFICANT CHANGE UP (ref 3.8–5.2)
RBC # FLD: 13.7 % — SIGNIFICANT CHANGE UP (ref 10.3–14.5)
SODIUM SERPL-SCNC: 141 MMOL/L — SIGNIFICANT CHANGE UP (ref 135–145)
WBC # BLD: 6.59 K/UL — SIGNIFICANT CHANGE UP (ref 3.8–10.5)
WBC # FLD AUTO: 6.59 K/UL — SIGNIFICANT CHANGE UP (ref 3.8–10.5)

## 2023-09-19 PROCEDURE — C1725: CPT

## 2023-09-19 PROCEDURE — 84100 ASSAY OF PHOSPHORUS: CPT

## 2023-09-19 PROCEDURE — C2623: CPT

## 2023-09-19 PROCEDURE — 85347 COAGULATION TIME ACTIVATED: CPT

## 2023-09-19 PROCEDURE — 99222 1ST HOSP IP/OBS MODERATE 55: CPT

## 2023-09-19 PROCEDURE — C1769: CPT

## 2023-09-19 PROCEDURE — C1894: CPT

## 2023-09-19 PROCEDURE — 36247 INS CATH ABD/L-EXT ART 3RD: CPT

## 2023-09-19 PROCEDURE — C1887: CPT

## 2023-09-19 PROCEDURE — 76000 FLUOROSCOPY <1 HR PHYS/QHP: CPT

## 2023-09-19 PROCEDURE — 37224: CPT

## 2023-09-19 PROCEDURE — 83735 ASSAY OF MAGNESIUM: CPT

## 2023-09-19 PROCEDURE — 82962 GLUCOSE BLOOD TEST: CPT

## 2023-09-19 PROCEDURE — 36415 COLL VENOUS BLD VENIPUNCTURE: CPT

## 2023-09-19 PROCEDURE — 37236 OPEN/PERQ PLACE STENT 1ST: CPT

## 2023-09-19 PROCEDURE — C1874: CPT

## 2023-09-19 PROCEDURE — 85027 COMPLETE CBC AUTOMATED: CPT

## 2023-09-19 PROCEDURE — 80048 BASIC METABOLIC PNL TOTAL CA: CPT

## 2023-09-19 RX ORDER — CLOPIDOGREL BISULFATE 75 MG/1
1 TABLET, FILM COATED ORAL
Qty: 30 | Refills: 2
Start: 2023-09-19

## 2023-09-19 RX ORDER — POTASSIUM CHLORIDE 20 MEQ
40 PACKET (EA) ORAL ONCE
Refills: 0 | Status: COMPLETED | OUTPATIENT
Start: 2023-09-19 | End: 2023-09-19

## 2023-09-19 RX ORDER — CLOPIDOGREL BISULFATE 75 MG/1
1 TABLET, FILM COATED ORAL
Qty: 0 | Refills: 0 | DISCHARGE
Start: 2023-09-19

## 2023-09-19 RX ORDER — POTASSIUM CHLORIDE 20 MEQ
20 PACKET (EA) ORAL ONCE
Refills: 0 | Status: DISCONTINUED | OUTPATIENT
Start: 2023-09-19 | End: 2023-09-19

## 2023-09-19 RX ORDER — POTASSIUM CHLORIDE 20 MEQ
20 PACKET (EA) ORAL
Refills: 0 | Status: DISCONTINUED | OUTPATIENT
Start: 2023-09-19 | End: 2023-09-19

## 2023-09-19 RX ORDER — MAGNESIUM OXIDE 400 MG ORAL TABLET 241.3 MG
400 TABLET ORAL ONCE
Refills: 0 | Status: COMPLETED | OUTPATIENT
Start: 2023-09-19 | End: 2023-09-19

## 2023-09-19 RX ADMIN — Medication 40 MILLIEQUIVALENT(S): at 11:08

## 2023-09-19 RX ADMIN — MAGNESIUM OXIDE 400 MG ORAL TABLET 400 MILLIGRAM(S): 241.3 TABLET ORAL at 09:34

## 2023-09-19 RX ADMIN — Medication 81 MILLIGRAM(S): at 11:08

## 2023-09-19 RX ADMIN — HEPARIN SODIUM 5000 UNIT(S): 5000 INJECTION INTRAVENOUS; SUBCUTANEOUS at 09:34

## 2023-09-19 NOTE — DISCHARGE NOTE NURSING/CASE MANAGEMENT/SOCIAL WORK - NSDCPEFALRISK_GEN_ALL_CORE
For information on Fall & Injury Prevention, visit: [email or web address] OR  [email or web address] OR  https://www.cdc.gov/steadi/patient.html

## 2023-09-19 NOTE — PROGRESS NOTE ADULT - SUBJECTIVE AND OBJECTIVE BOX
O/N: JOVANA CROSS           ---------------------------------------------------------------------------  PLEASE CHECK WHEN PRESENT:  [  ] Heart Failure  [  ] Acute  [  ] Acute on Chronic  [  ] Chronic  -------------------------------------------------------------------  [  ]Diastolic [HFpEF]  [  ]Systolic [HFrEF]  [  ]Combined [HFpEF & HFrEF]  [  ] afib  [  ] hypertensive heart disease  [  ]Other:  -------------------------------------------------------------------  [ ] Respiratory failure  [ ] Acute cor pulmonale  [ ] Asthma/COPD Exacerbation  [ ] Pleural effusion  [ ] Aspiration pneumonia  -------------------------------------------------------------------  [  ]RICH  [  ]ATN  [  ]Reneal Medullary Necrosis  [  ]Renal Cortical Necrosis  [  ]Other Pathological Lesions:    [  ]CKD 1  [  ]CKD 2  [  ]CKD 3  [  ]CKD 4  [  ]CKD 5  [  ]Other  -------------------------------------------------------------------  [  ]Diabetes  [  ] Diabetic PVD Ulcer  [  ] Neuropathic ulcer to DM  [  ] Diabetes with Nephropathy  [  ] Osteomyelitis due to diabetes  --------------------------------------------------------------------  [  ]Malnutrition: See Nutrition Note  [  ]Cachexia  [  ]Other:   [  ]Supplement Ordered:  [  ]Morbid Obesity (BMI >=40]  ---------------------------------------------------------------------  [ ] Sepsis/severe sepsis/septic shock  [ ] UTI  [ ] Pneumonia  -----------------------------------------------------------------------  [ ] Acidosis/alkalosis  [ ] Fluid overload  [ ] Hypokalemia  [ ] Hyperkalemia  [ ] Hypomagnesemia  [ ] Hypophosphatemia  [ ] Hyperphosphatemia  ------------------------------------------------------------------------  [ ] Acute blood loss anemia  [ ] Post op blood loss anemia  [ ] Iron deficiency anemia  [ ] Anemia due to chronic disease  [ ] Hypercoagulable state  ----------------------------------------------------------------------  [ ] Cerebral infarction  [ ] Transient ischemia attack  [ ] Encephalopathy        Assessment:  · Assessment	  Assessment  Ms. Constantino is a 77F with PMH of HTN, HLD, T2DM, former smoking, CAD s/p 4v CABG, anxiety/depression, presenting for severe 1 block claudication of her bilateral lower extremities. Reports more severe symptoms in her right leg than her left leg. No rest pain or tissue loss. She is now s/p RLE angiogram with stenting of the SFA and is being admitted postoperatively for monitoring    Plan  #PAD  - Now s/p angioplasty and stent of the R SFA  - Continue on DAPT postop (Plavix is a new medication)    #T2DM  - ISS    #HTN  - Holding home valsartan      O/N: AMERICA, VSS       S: Patient does not have any complaints    O: Examined in bed resting comfortably     ROS: Denies headache, blurred vision, chest pain, SOB, abdominal pain, nausea or vomiting.         aspirin  chewable 81  clopidogrel Tablet 75  heparin   Injectable 5000      Allergies    penicillin (Angioedema)  Crestor (Unknown)  Zetia (Unknown)  Lipitor (Unknown)    Intolerances        Vital Signs Last 24 Hrs  T(C): 36.6 (19 Sep 2023 04:48), Max: 36.8 (18 Sep 2023 20:53)  T(F): 97.8 (19 Sep 2023 04:48), Max: 98.3 (18 Sep 2023 20:53)  HR: 67 (19 Sep 2023 04:48) (63 - 78)  BP: 123/64 (19 Sep 2023 04:48) (118/62 - 191/81)  BP(mean): 90 (18 Sep 2023 16:58) (85 - 117)  RR: 18 (19 Sep 2023 04:48) (17 - 18)  SpO2: 95% (19 Sep 2023 04:48) (92% - 97%)    Parameters below as of 19 Sep 2023 04:48  Patient On (Oxygen Delivery Method): room air      I&O's Summary    18 Sep 2023 07:01  -  19 Sep 2023 07:00  --------------------------------------------------------  IN: 850 mL / OUT: 400 mL / NET: 450 mL        Physical Exam:  General: alert and awake, NAD  Pulmonary: no respiratory distress, normal WOB  Cardiovascular: RRR  Abdominal: soft, non-tender, non-distended. L Groin soft with CDI dressing. No evidence of hematoma or fluid collection.   Extremities: RLE unremarkable, LLE unremarkable. B/L 5/5 strength and sensation intact  Pulses:   Right:                                                                          Left:  FEM [ ]2+ [ ]1+ [ ]doppler                                             FEM [ ]2+ [ ]1+ [ ]doppler    POP [ ]2+ [ ]1+ [ ]doppler                                             POP [ ]2+ [ ]1+ [ ]doppler    DP [ ]2+ [ ]1+ [x ]doppler                                                DP [x ]2+ [ ]1+ [ ]doppler  PT[ ]2+ [ ]1+ [x ]doppler                                                  PT [x ]2+ [ ]1+ [ ]doppler      Radiology and Additional Studies:    ---------------------------------------------------------------------------  PLEASE CHECK WHEN PRESENT:  [  ] Heart Failure  [  ] Acute  [  ] Acute on Chronic  [  ] Chronic  -------------------------------------------------------------------  [  ]Diastolic [HFpEF]  [  ]Systolic [HFrEF]  [  ]Combined [HFpEF & HFrEF]  [  ] afib  [  ] hypertensive heart disease  [  ]Other:  -------------------------------------------------------------------  [ ] Respiratory failure  [ ] Acute cor pulmonale  [ ] Asthma/COPD Exacerbation  [ ] Pleural effusion  [ ] Aspiration pneumonia  -------------------------------------------------------------------  [  ]RICH  [  ]ATN  [  ]Reneal Medullary Necrosis  [  ]Renal Cortical Necrosis  [  ]Other Pathological Lesions:    [  ]CKD 1  [  ]CKD 2  [  ]CKD 3  [  ]CKD 4  [  ]CKD 5  [  ]Other  -------------------------------------------------------------------  [  ]Diabetes  [  ] Diabetic PVD Ulcer  [  ] Neuropathic ulcer to DM  [  ] Diabetes with Nephropathy  [  ] Osteomyelitis due to diabetes  --------------------------------------------------------------------  [  ]Malnutrition: See Nutrition Note  [  ]Cachexia  [  ]Other:   [  ]Supplement Ordered:  [  ]Morbid Obesity (BMI >=40]  ---------------------------------------------------------------------  [ ] Sepsis/severe sepsis/septic shock  [ ] UTI  [ ] Pneumonia  -----------------------------------------------------------------------  [ ] Acidosis/alkalosis  [ ] Fluid overload  [ ] Hypokalemia  [ ] Hyperkalemia  [ ] Hypomagnesemia  [ ] Hypophosphatemia  [ ] Hyperphosphatemia  ------------------------------------------------------------------------  [ ] Acute blood loss anemia  [ ] Post op blood loss anemia  [ ] Iron deficiency anemia  [ ] Anemia due to chronic disease  [ ] Hypercoagulable state  ----------------------------------------------------------------------  [ ] Cerebral infarction  [ ] Transient ischemia attack  [ ] Encephalopathy        Assessment:  · Assessment	  Assessment  Ms. Constantino is a 77F with PMH of HTN, HLD, T2DM, former smoking, CAD s/p 4v CABG, anxiety/depression, presenting for severe 1 block claudication of her bilateral lower extremities. Reports more severe symptoms in her right leg than her left leg. No rest pain or tissue loss. She is now s/p RLE angiogram with stenting of the SFA and is being admitted postoperatively for monitoring. Pending dc to home 9/19 after ambulation    Plan  #PAD  - Now s/p angioplasty and stent of the R SFA  - Continue on DAPT postop (Plavix is a new medication)    #T2DM  - ISS    #HTN  - Holding home valsartan    Dispo: Home today 9/19

## 2023-09-19 NOTE — DISCHARGE NOTE NURSING/CASE MANAGEMENT/SOCIAL WORK - PATIENT PORTAL LINK FT
You can access the FollowMyHealth Patient Portal offered by [facility] by registering at the following website: [email or web address]. By joining [facility]’s FollowMyHealth portal, you will also be able to view your health information using other applications (apps) compatible with our system.

## 2023-09-19 NOTE — CONSULT NOTE ADULT - SUBJECTIVE AND OBJECTIVE BOX
See below for vascular HPI:  "Ms. Constantino is a 77F with PMH of HTN, HLD, T2DM, former smoking, CAD s/p 4v CABG, anxiety/depression, presenting for severe 1 block claudication of her bilateral lower extremities. Reports more severe symptoms in her right leg than her left leg. No rest pain or tissue loss. She is now s/p RLE angiogram with stenting of the SFA and is being admitted postoperatively for monitoring.         Allergies and Intolerances:        Allergies:  	penicillin: Drug, Angioedema  	Zetia: Drug, Unknown  	Crestor: Drug, Unknown  	Lipitor: Drug, Unknown    Home Medications:   * Patient Currently Takes Medications as of 18-Sep-2023 11:49 documented in Structured Notes  · 	pantoprazole 40 mg oral delayed release tablet: Last Dose Taken:  , 1 tab(s) orally once a day  · 	aspirin 81 mg oral delayed release tablet: Last Dose Taken:  , 1 tab(s) orally once a day  · 	metFORMIN 500 mg oral tablet: Last Dose Taken:  , 500 milligram(s) orally 2 times a day  · 	valsartan 40 mg oral tablet: Last Dose Taken:  , 1 orally 2 times a day  · 	Jardiance 10 mg oral tablet: Last Dose Taken:  , 1 orally  · 	Repatha SureClick 140 mg/mL subcutaneous solution: Last Dose Taken:  , subcutaneous every 2 weeks    .    Patient History:   Past Medical, Past Surgical, and Family History:  PAST MEDICAL HISTORY:  Diabetes mellitus     GERD (gastroesophageal reflux disease)     Hyperlipidemia     Hypothyroidism     Lung cancer.     PAST SURGICAL HISTORY:  S/P knee surgery fractured right patella    S/P partial lobectomy of lung left 2012.     FAMILY HISTORY:  Father  Still living? Unknown  FH: myocardial infarction, Age at diagnosis: Age Unknown.    Social History:  · Substance use	No    Tobacco Screening:  · Core Measure Site	No  "    Vital Signs Last 24 Hrs  T(C): 36.6 (19 Sep 2023 04:48), Max: 36.8 (18 Sep 2023 20:53)  T(F): 97.8 (19 Sep 2023 04:48), Max: 98.3 (18 Sep 2023 20:53)  HR: 65 (19 Sep 2023 08:29) (63 - 78)  BP: 141/68 (19 Sep 2023 08:29) (120/66 - 191/81)  BP(mean): 98 (19 Sep 2023 08:29) (86 - 117)  RR: 18 (19 Sep 2023 08:29) (17 - 18)  SpO2: 95% (19 Sep 2023 08:29) (92% - 95%)    Parameters below as of 19 Sep 2023 08:29  Patient On (Oxygen Delivery Method): room air    Physical exam  General: no acute distress, sitting up in bed  HEENT: normocephalic, atraumatic, MMM  Cards: RRR, normal S1S2  Pulm: CTAB, no wheeze, crackles, rales or rhonchi  Ab: soft, nontender, nondistended, normoactive bowel sounds  Ext: wwp  Neuro: speech is fluent, follows commands  Skin: warm and dry, no obvious rashes or lesions present

## 2023-09-19 NOTE — CONSULT NOTE ADULT - ASSESSMENT
77F with PMH of HTN, HLD, DM2, , CAD s/p CABD, anxiety and depression presenting for RLE angiogram and stenting of the SFA.  Monitored overnight, and now being discharge home.     #S/p SFA stenting  - plan per vascular surgery  - discharge today    #HTN  #HLD  #CAD s/p CABG  - continue home medications    #DM2  - please provide outpatient endocrine clinic phone number as patient wishes to establish care with an endocrinologist    Moderate level of decision making required for this encounter, including the presence of two chronic stable medical conditions (HTN and HLD) as well as discussion of plan with the vascular team.

## 2023-09-26 DIAGNOSIS — I10 ESSENTIAL (PRIMARY) HYPERTENSION: ICD-10-CM

## 2023-09-26 DIAGNOSIS — I25.10 ATHEROSCLEROTIC HEART DISEASE OF NATIVE CORONARY ARTERY WITHOUT ANGINA PECTORIS: ICD-10-CM

## 2023-09-26 DIAGNOSIS — E03.9 HYPOTHYROIDISM, UNSPECIFIED: ICD-10-CM

## 2023-09-26 DIAGNOSIS — Z88.0 ALLERGY STATUS TO PENICILLIN: ICD-10-CM

## 2023-09-26 DIAGNOSIS — F32.A DEPRESSION, UNSPECIFIED: ICD-10-CM

## 2023-09-26 DIAGNOSIS — Z87.891 PERSONAL HISTORY OF NICOTINE DEPENDENCE: ICD-10-CM

## 2023-09-26 DIAGNOSIS — Z95.1 PRESENCE OF AORTOCORONARY BYPASS GRAFT: ICD-10-CM

## 2023-09-26 DIAGNOSIS — Z91.09 OTHER ALLERGY STATUS, OTHER THAN TO DRUGS AND BIOLOGICAL SUBSTANCES: ICD-10-CM

## 2023-09-26 DIAGNOSIS — Z79.890 HORMONE REPLACEMENT THERAPY: ICD-10-CM

## 2023-09-26 DIAGNOSIS — E78.5 HYPERLIPIDEMIA, UNSPECIFIED: ICD-10-CM

## 2023-09-26 DIAGNOSIS — E11.51 TYPE 2 DIABETES MELLITUS WITH DIABETIC PERIPHERAL ANGIOPATHY WITHOUT GANGRENE: ICD-10-CM

## 2023-09-26 DIAGNOSIS — I70.211 ATHEROSCLEROSIS OF NATIVE ARTERIES OF EXTREMITIES WITH INTERMITTENT CLAUDICATION, RIGHT LEG: ICD-10-CM

## 2023-09-26 DIAGNOSIS — Z85.118 PERSONAL HISTORY OF OTHER MALIGNANT NEOPLASM OF BRONCHUS AND LUNG: ICD-10-CM

## 2023-09-26 DIAGNOSIS — Z79.84 LONG TERM (CURRENT) USE OF ORAL HYPOGLYCEMIC DRUGS: ICD-10-CM

## 2023-09-26 DIAGNOSIS — F41.9 ANXIETY DISORDER, UNSPECIFIED: ICD-10-CM

## 2023-09-26 DIAGNOSIS — Z90.2 ACQUIRED ABSENCE OF LUNG [PART OF]: ICD-10-CM

## 2023-09-26 DIAGNOSIS — I77.1 STRICTURE OF ARTERY: ICD-10-CM

## 2023-10-03 LAB — ISTAT ACTK (ACTIVATED CLOTTING TIME KAOLIN): 191 SEC — HIGH (ref 74–137)

## 2023-10-17 ENCOUNTER — APPOINTMENT (OUTPATIENT)
Dept: VASCULAR SURGERY | Facility: CLINIC | Age: 78
End: 2023-10-17
Payer: MEDICARE

## 2023-10-17 PROCEDURE — 93926 LOWER EXTREMITY STUDY: CPT

## 2023-10-17 PROCEDURE — 99213 OFFICE O/P EST LOW 20 MIN: CPT

## 2023-10-23 ENCOUNTER — APPOINTMENT (OUTPATIENT)
Dept: ENDOCRINOLOGY | Facility: CLINIC | Age: 78
End: 2023-10-23
Payer: MEDICARE

## 2023-10-23 VITALS
SYSTOLIC BLOOD PRESSURE: 123 MMHG | HEART RATE: 94 BPM | WEIGHT: 108 LBS | DIASTOLIC BLOOD PRESSURE: 81 MMHG | BODY MASS INDEX: 21.09 KG/M2

## 2023-10-23 DIAGNOSIS — E06.3 AUTOIMMUNE THYROIDITIS: ICD-10-CM

## 2023-10-23 DIAGNOSIS — Z13.820 ENCOUNTER FOR SCREENING FOR OSTEOPOROSIS: ICD-10-CM

## 2023-10-23 PROCEDURE — 99214 OFFICE O/P EST MOD 30 MIN: CPT

## 2023-10-23 RX ORDER — PANTOPRAZOLE 40 MG/1
40 TABLET, DELAYED RELEASE ORAL DAILY
Qty: 30 | Refills: 0 | Status: DISCONTINUED | COMMUNITY
End: 2023-10-23

## 2023-10-23 RX ORDER — LEVOTHYROXINE SODIUM 0.05 MG/1
50 TABLET ORAL DAILY
Qty: 30 | Refills: 5 | Status: ACTIVE | COMMUNITY
Start: 2023-10-23 | End: 1900-01-01

## 2023-11-01 NOTE — PROGRESS NOTE ADULT - NS ATTEND AMEND GEN_ALL_CORE FT
Glucose levels were 106-129 yesterday and today 123-251. I agree with continuing 12 units Lantus Insulin plus 4 units pre-meal Lispro Insulin.
none

## 2023-12-01 RX ORDER — BEMPEDOIC ACID 180 MG/1
180 TABLET, FILM COATED ORAL
Refills: 0 | Status: ACTIVE | COMMUNITY

## 2024-01-19 ENCOUNTER — APPOINTMENT (OUTPATIENT)
Dept: OPHTHALMOLOGY | Facility: CLINIC | Age: 79
End: 2024-01-19
Payer: MEDICARE

## 2024-01-19 ENCOUNTER — NON-APPOINTMENT (OUTPATIENT)
Age: 79
End: 2024-01-19

## 2024-01-19 PROCEDURE — 92004 COMPRE OPH EXAM NEW PT 1/>: CPT

## 2024-01-19 PROCEDURE — 92134 CPTRZ OPH DX IMG PST SGM RTA: CPT

## 2024-04-16 ENCOUNTER — APPOINTMENT (OUTPATIENT)
Dept: MRI IMAGING | Facility: CLINIC | Age: 79
End: 2024-04-16
Payer: MEDICARE

## 2024-04-16 ENCOUNTER — APPOINTMENT (OUTPATIENT)
Dept: VASCULAR SURGERY | Facility: CLINIC | Age: 79
End: 2024-04-16
Payer: MEDICARE

## 2024-04-16 VITALS
DIASTOLIC BLOOD PRESSURE: 83 MMHG | BODY MASS INDEX: 21.2 KG/M2 | HEIGHT: 60 IN | HEART RATE: 70 BPM | WEIGHT: 108 LBS | SYSTOLIC BLOOD PRESSURE: 145 MMHG

## 2024-04-16 PROCEDURE — A9585: CPT | Mod: JW

## 2024-04-16 PROCEDURE — 99213 OFFICE O/P EST LOW 20 MIN: CPT

## 2024-04-16 PROCEDURE — 93926 LOWER EXTREMITY STUDY: CPT

## 2024-04-16 PROCEDURE — 72158 MRI LUMBAR SPINE W/O & W/DYE: CPT

## 2024-04-17 NOTE — PHYSICAL EXAM
[Normal Thyroid] : the thyroid was normal [Normal Breath Sounds] : Normal breath sounds [Respiratory Effort] : normal respiratory effort [Normal Heart Sounds] : normal heart sounds [Normal Rate and Rhythm] : normal rate and rhythm [0] : left 0 [1+] : left 1+ [No Rash or Lesion] : No rash or lesion [Alert] : alert [Calm] : calm [JVD] : no jugular venous distention  [Ankle Swelling (On Exam)] : not present [Varicose Veins Of Lower Extremities] : not present [] : not present [Abdomen Tenderness] : ~T ~M No abdominal tenderness [Purpura] : no purpura  [Petechiae] : no petechiae [Skin Ulcer] : no ulcer [Skin Induration] : no induration [de-identified] : Thin habitus, NAD [de-identified] : NC/AT, anicteric [FreeTextEntry1] : L groin soft w/ mild ecchymosis.  [de-identified] : FROM throughout, strength 5/5x4, no muscle atrophy noted in LEs [de-identified] : Brisk cap refill in b/l feet/toes [de-identified] : Neurosensory/neuromotor grossly intact

## 2024-04-17 NOTE — HISTORY OF PRESENT ILLNESS
[FreeTextEntry1] : 78yoF seen in our office for RLE claudication that limited her walking distance and pace due to high-grade SFA stenosis on non-invasive testing, now s/p R SFA PTA/stent via L femoral artery access, returning w/complaints of burning in the anterior thigh and lower leg after walking 1-2 blocks.  Pt also reports low back pain that radiates to her hips, has not yet been evaluated for this pain.

## 2024-04-17 NOTE — PROCEDURE
[FreeTextEntry1] : RLE duplex performed to evaluate for SFA stent patency demonstrates an occluded R SFA stent w/reconstitution of the dSFA, three vessel occlusion noted below the knee.

## 2024-04-17 NOTE — ADDENDUM
[FreeTextEntry1] : This note was written by Candelario Guillaume, acting as a scribe for Dr. Olamide Tolliver.  I, Dr. Olamide Tolliver, have read and attest that all the information, medical decision-making, and discharge instructions within are true and accurate.  I, Dr. Olamide Tolliver, personally performed the evaluation and management (E/M) services for this established patient who presents today with (an) existing condition(s).  That E/M includes conducting the examination, assessing all conditions, and (re)establishing/reinforcing a plan of care.  Today, my ACP, Candelario Guillaume, was here to observe my evaluation and management services for this condition to be followed going forward.

## 2024-04-17 NOTE — ASSESSMENT
[FreeTextEntry1] : 78yoF seen in our office for RLE claudication that limited her walking distance and pace due to high-grade SFA stenosis on non-invasive testing, now s/p R SFA PTA/stent via L femoral artery access, returning w/complaints of burning in the anterior thigh and lower leg after walking 1-2 blocks.  Pt also reports low back pain that radiates to her hips, has not yet been evaluated for this pain.  Legs well-perfused on exam but RLE duplex performed to evaluate for SFA stent patency demonstrates an occluded R SFA stent w/reconstitution of the dSFA, three vessel occlusion noted below the knee.  Symptoms may be caused by her SFA occlusion but in light of her low back pain w/radiation, will first plan for MRI L-S spine and reserve possible revascularization of the RLE if no findings are noted on MRI.  ADDENDUM: MRI L-S spine notes lytic lesion of the sacrum, no acute fx but degenerative changes of the sacroiliac joints noted and central canal and foraminal narrowing noted at multiple levels. Pt contact w/results by phone and explained to her that we will discuss w/Dr. Maravilla as he may want to work-up/refer pt himself.

## 2024-05-07 ENCOUNTER — APPOINTMENT (OUTPATIENT)
Dept: ORTHOPEDIC SURGERY | Facility: CLINIC | Age: 79
End: 2024-05-07

## 2024-05-07 VITALS
HEART RATE: 78 BPM | BODY MASS INDEX: 21.2 KG/M2 | DIASTOLIC BLOOD PRESSURE: 79 MMHG | SYSTOLIC BLOOD PRESSURE: 138 MMHG | WEIGHT: 108 LBS | OXYGEN SATURATION: 98 % | HEIGHT: 60 IN

## 2024-05-07 PROCEDURE — 72083 X-RAY EXAM ENTIRE SPI 4/5 VW: CPT

## 2024-05-07 PROCEDURE — 99204 OFFICE O/P NEW MOD 45 MIN: CPT

## 2024-05-07 NOTE — HISTORY OF PRESENT ILLNESS
[de-identified] : Referred by Dr. Tolliver  Ms. WALL is a very pleasant 78 year old female who complains of diffuse pain to both lower extremities to several years, describes as a burning sensation. Also with chronic low back pain, not significantly bothersome. S/p R RFA PTA/stent now   The patient no history of previous spine surgery.  The patient has no history of unexpected weight loss, no history of active cancer, no history bladder or bowel dysfunction, no night pain, no fevers or chills.  The past medical history, surgical history, family history, allergies, medications, 10+ point review of systems, family history and social history were reviewed and non contributory.

## 2024-05-09 ENCOUNTER — OUTPATIENT (OUTPATIENT)
Dept: OUTPATIENT SERVICES | Facility: HOSPITAL | Age: 79
LOS: 1 days | End: 2024-05-09
Payer: MEDICARE

## 2024-05-09 ENCOUNTER — APPOINTMENT (OUTPATIENT)
Dept: NUCLEAR MEDICINE | Facility: HOSPITAL | Age: 79
End: 2024-05-09

## 2024-05-09 DIAGNOSIS — Z98.890 OTHER SPECIFIED POSTPROCEDURAL STATES: Chronic | ICD-10-CM

## 2024-05-09 DIAGNOSIS — Z90.2 ACQUIRED ABSENCE OF LUNG [PART OF]: Chronic | ICD-10-CM

## 2024-05-09 LAB — GLUCOSE BLDC GLUCOMTR-MCNC: 97 MG/DL — SIGNIFICANT CHANGE UP (ref 70–99)

## 2024-05-09 PROCEDURE — 82962 GLUCOSE BLOOD TEST: CPT

## 2024-05-09 PROCEDURE — 78815 PET IMAGE W/CT SKULL-THIGH: CPT | Mod: MH

## 2024-05-09 PROCEDURE — A9552: CPT

## 2024-05-09 PROCEDURE — 78815 PET IMAGE W/CT SKULL-THIGH: CPT | Mod: 26,PI,MH

## 2024-05-10 ENCOUNTER — APPOINTMENT (OUTPATIENT)
Dept: PAIN MANAGEMENT | Facility: CLINIC | Age: 79
End: 2024-05-10

## 2024-05-21 ENCOUNTER — APPOINTMENT (OUTPATIENT)
Dept: PAIN MANAGEMENT | Facility: CLINIC | Age: 79
End: 2024-05-21

## 2024-05-21 DIAGNOSIS — M54.6 PAIN IN THORACIC SPINE: ICD-10-CM

## 2024-05-21 DIAGNOSIS — M54.16 RADICULOPATHY, LUMBAR REGION: ICD-10-CM

## 2024-05-21 DIAGNOSIS — M54.50 LOW BACK PAIN, UNSPECIFIED: ICD-10-CM

## 2024-05-21 DIAGNOSIS — M54.12 RADICULOPATHY, CERVICAL REGION: ICD-10-CM

## 2024-05-21 PROCEDURE — 99205 OFFICE O/P NEW HI 60 MIN: CPT

## 2024-05-21 RX ORDER — PREGABALIN 25 MG/1
25 CAPSULE ORAL 3 TIMES DAILY
Qty: 90 | Refills: 0 | Status: ACTIVE | COMMUNITY
Start: 2024-05-21 | End: 1900-01-01

## 2024-05-22 PROBLEM — M54.50 LOW BACK PAIN, EPISODIC: Status: ACTIVE | Noted: 2024-04-16

## 2024-05-22 NOTE — PHYSICAL EXAM
[Normal] : Regular, no tachycardia [Spinous Process Tenderness] : spinous process tenderness [Normal muscle bulk without asymmetry] : normal muscle bulk without asymmetry [Facet Tenderness] : facet tenderness [Paraspinal Tenderness] : paraspinal tenderness [Cane] : ambulates with cane [Spurling] : positive Spurling's Test [Manriquez's Sign] : positive Manriquez's Sign [SLR] : positive straight leg raise [] : Motor: [___/5] : left ([unfilled]/5) [NL] : normal and symmetric bilaterally [UE/LE] : Sensory: Intact in bilateral upper & lower extremities [Bicep] : biceps 2+ and symmetric bilaterally [B.R.] : Brachioradialis 2+ and symmetric bilaterally [Patellar] : patellar 2+ and symmetric bilaterally [Achilles] : Achilles 2+ and symmetric bilaterally [de-identified] : walks with caution  [de-identified] : limited range of motion

## 2024-05-22 NOTE — HISTORY OF PRESENT ILLNESS
[Back Pain] : back pain [Neck Pain] : neck pain [___ yrs] : [unfilled] year(s) ago [Episodic] : episodic [FreeTextEntry1] : Patient is a 78 year old female with pmhx of quadruple bypass in 2023, on clopidogrel and ASA 81, who was referred by Dr. Nguyen for chronic low back pain. Patient reports she has a history of 50 years of back pain which began after an injury while horseback riding and caused an injury at [employer] reportedly. She also had an injury while playing squash about 10 years ago which resulted in a sacrum injury. This was treated with chiropractic care, physical therapy, and conservative management. She presents today with chronic lumbar back pain as well as radiating leg pain. She reports the leg pain is worse than the back pain. The pain is worse with walking, activity, going down stairs and is improved with sitting down.   She also complains of chronic neck pain. She does feel some radiculopathy to her left arm at times. The pain is worse with activity and neck extension. She does home exercises that her chiropractor told her to do and gets chiropractic treatments as well. She reports urinary incontinence for years and had recent episodes of bowel incontinence 2 months ago. She has not seen a urologist for this matter. She does not have imaging of her cervical spine MRI.

## 2024-05-22 NOTE — REVIEW OF SYSTEMS
[Fecal Incontinence] : fecal incontinence [Incontinence] : incontinence [Back Pain] : back pain [Neck Pain] : neck pain [Radiating Pain] : radiating pain [Negative] : Cardiovascular

## 2024-05-22 NOTE — ASSESSMENT
[FreeTextEntry1] : 78 year old female with pmhx of quadruple bypass in 2023, on clopidogrel and ASA 81, who was referred by Dr. Nguyen for chronic neck and low back pain x 50 years. Patient reports history of T8 injury horseback riding 50 years ago, as well as sacrum injury playing squash 10 years ago. She has been having symptoms of cervical and lumbar radiculopathy with spinal stenosis. Patient has been treating pain over the years with OTC Tylenol, conservative management, physical therapy and chiropractic care. Lumbar MRI shows spinal stenosis likely contributing to her pain. Patient shows signs of possible cervical cord compression with positive Manriquez's sign and urinary and bowel incontinence. She has not had recent imaging but reports being advised by a previous provider to seek emergency attention if cervical spine red flag symptoms present.   Plan: - Patient to get Cervical MRI to assess for possible cord compression. Patient was given prescription today - Patient to have Thoracic MRI done given history of T8 injury and current level of back pain - Establish care with cardiologist - Recommend LESI for symptoms of lumbar radiculopathy. Patient on Clopidogrel, must obtain approval from cardiologist whether it is safe to pause medication 7 days prior to scheduling an injection at the office.  - Start Pregabalin 25 mg. Patient was given written out instructions on how to initiate therapy. Patient to take nightly for 3 days. If tolerated, progress to twice daily for 3 days. If tolerated, take up to three times a day

## 2024-05-22 NOTE — DATA REVIEWED
[FreeTextEntry1] : MR SPINE LUMBAR 4/16/24  INTERPRETATION:  CLINICAL INFORMATION: Lower back pain, difficulty walking. History of lung cancer  ADDITIONAL CLINICAL INFORMATION: Other Condition see Clinical Info  TECHNIQUE: Multiplanar, multisequence MRI was performed of the lumbar spine. IV Contrast: Gadavist  5 cc administered.  2.5 cc discarded  PRIOR STUDIES: No priors available for comparison.  FINDINGS:  LOCALIZER: No additional findings. BONES: 5 lumbar vertebral bodies are assumed. No acute fracture. Indeterminate T1 hypointense lesion within the right sacral ala, measuring 1 x 1.3 cm transaxially. ALIGNMENT: Trace anterolisthesis of L5 on S1. Levocurvature of the lumbar spine. SACROILIAC JOINTS/SACRUM: Degenerative changes of the visualized sacroiliac joints. CONUS AND CAUDA EQUINA: Conus terminates at L2. No abnormal cord signal or enhancement. VISUALIZED INTRAPELVIC/INTRA-ABDOMINAL SOFT TISSUES: Partially evaluated colonic diverticulosis. Bilateral renal cysts noted. PARASPINAL SOFT TISSUES: Mild fatty infiltration of the posterior paraspinal musculature.   INDIVIDUAL LEVELS:  T10-T11: Only sagittal images. Small disc herniation. No significant neural foraminal narrowing. Mild central canal narrowing. T11-T12: Small disc osteophyte complex. Mild right facet arthrosis. No canal or neuroforaminal stenosis. T12-L1: No canal or neuroforaminal stenosis. L1-L2: No canal or neuroforaminal stenosis. L2-L3: Minimal disc bulge asymmetric to the left. Mild bilateral facet arthrosis. No right and mild left neural foraminal narrowing. Ligamentum flavum hypertrophy. Prominence of the posterior epidural fat. No central canal stenosis. L3-L4: Disc bulge with disc osteophyte complex asymmetric to the left. Mild bilateral facet arthrosis. Mild bilateral neural foraminal narrowing. Ligamentum flavum hypertrophy. Prominence of the posterior epidural fat. Mild narrowing of the right lateral recess with moderate narrowing of the left lateral recess. Moderate central canal stenosis. L4-L5: Disc bulge with disc osteophyte complex. Mild bilateral facet arthrosis. Moderate right and mild left neural foraminal narrowing. No central canal stenosis. L5-S1: Mild uncovering of the posterior disc space with disc bulge asymmetric to the left. Mild bilateral facet arthrosis. Mild bilateral neural foraminal narrowing. No central canal stenosis.  IMPRESSION:  Indeterminate T1 hypointense lesion within the right sacral ala. Differential considerations include both benign and malignant etiologies, such as a lipid poor hemangioma/focal red marrow, metastatic disease, or other etiology. Recommend short-term interval follow-up MRI of the pelvis in 8-12 weeks with tumor protocol and in/opposed phase imaging. Bone scan may be of value, as well.  Multilevel degenerative changes throughout the lumbar spine, as detailed above, resulting in up to moderate central canal narrowing at L3-L4. Multilevel lateral recess and neural foraminal narrowing, as above.

## 2024-06-17 ENCOUNTER — APPOINTMENT (OUTPATIENT)
Dept: HEART AND VASCULAR | Facility: CLINIC | Age: 79
End: 2024-06-17

## 2024-06-30 ENCOUNTER — EMERGENCY (EMERGENCY)
Facility: HOSPITAL | Age: 79
LOS: 1 days | Discharge: HOME | End: 2024-06-30
Attending: EMERGENCY MEDICINE | Admitting: EMERGENCY MEDICINE
Payer: MEDICARE

## 2024-06-30 VITALS
DIASTOLIC BLOOD PRESSURE: 68 MMHG | SYSTOLIC BLOOD PRESSURE: 133 MMHG | TEMPERATURE: 99 F | OXYGEN SATURATION: 98 % | RESPIRATION RATE: 16 BRPM | HEART RATE: 80 BPM

## 2024-06-30 VITALS
OXYGEN SATURATION: 98 % | HEART RATE: 85 BPM | WEIGHT: 106.04 LBS | SYSTOLIC BLOOD PRESSURE: 161 MMHG | DIASTOLIC BLOOD PRESSURE: 86 MMHG | RESPIRATION RATE: 18 BRPM | TEMPERATURE: 98 F

## 2024-06-30 DIAGNOSIS — K57.92 DIVERTICULITIS OF INTESTINE, PART UNSPECIFIED, WITHOUT PERFORATION OR ABSCESS WITHOUT BLEEDING: ICD-10-CM

## 2024-06-30 DIAGNOSIS — E78.5 HYPERLIPIDEMIA, UNSPECIFIED: ICD-10-CM

## 2024-06-30 DIAGNOSIS — F32.A DEPRESSION, UNSPECIFIED: ICD-10-CM

## 2024-06-30 DIAGNOSIS — Z98.890 OTHER SPECIFIED POSTPROCEDURAL STATES: Chronic | ICD-10-CM

## 2024-06-30 DIAGNOSIS — E11.9 TYPE 2 DIABETES MELLITUS WITHOUT COMPLICATIONS: ICD-10-CM

## 2024-06-30 DIAGNOSIS — Z88.8 ALLERGY STATUS TO OTHER DRUGS, MEDICAMENTS AND BIOLOGICAL SUBSTANCES STATUS: ICD-10-CM

## 2024-06-30 DIAGNOSIS — Z88.0 ALLERGY STATUS TO PENICILLIN: ICD-10-CM

## 2024-06-30 DIAGNOSIS — K59.00 CONSTIPATION, UNSPECIFIED: ICD-10-CM

## 2024-06-30 DIAGNOSIS — I25.10 ATHEROSCLEROTIC HEART DISEASE OF NATIVE CORONARY ARTERY WITHOUT ANGINA PECTORIS: ICD-10-CM

## 2024-06-30 DIAGNOSIS — Z95.1 PRESENCE OF AORTOCORONARY BYPASS GRAFT: ICD-10-CM

## 2024-06-30 DIAGNOSIS — R11.0 NAUSEA: ICD-10-CM

## 2024-06-30 DIAGNOSIS — Z90.2 ACQUIRED ABSENCE OF LUNG [PART OF]: Chronic | ICD-10-CM

## 2024-06-30 DIAGNOSIS — I10 ESSENTIAL (PRIMARY) HYPERTENSION: ICD-10-CM

## 2024-06-30 DIAGNOSIS — Z87.891 PERSONAL HISTORY OF NICOTINE DEPENDENCE: ICD-10-CM

## 2024-06-30 LAB
ANION GAP SERPL CALC-SCNC: 10 MMOL/L — SIGNIFICANT CHANGE UP (ref 5–17)
APPEARANCE UR: CLEAR — SIGNIFICANT CHANGE UP
BACTERIA # UR AUTO: ABNORMAL /HPF
BASOPHILS # BLD AUTO: 0.03 K/UL — SIGNIFICANT CHANGE UP (ref 0–0.2)
BASOPHILS NFR BLD AUTO: 0.3 % — SIGNIFICANT CHANGE UP (ref 0–2)
BILIRUB UR-MCNC: NEGATIVE — SIGNIFICANT CHANGE UP
BUN SERPL-MCNC: 15 MG/DL — SIGNIFICANT CHANGE UP (ref 7–23)
CALCIUM SERPL-MCNC: 9 MG/DL — SIGNIFICANT CHANGE UP (ref 8.4–10.5)
CAST: 0 /LPF — SIGNIFICANT CHANGE UP (ref 0–4)
CHLORIDE SERPL-SCNC: 108 MMOL/L — SIGNIFICANT CHANGE UP (ref 96–108)
CO2 SERPL-SCNC: 24 MMOL/L — SIGNIFICANT CHANGE UP (ref 22–31)
COLOR SPEC: YELLOW — SIGNIFICANT CHANGE UP
CREAT SERPL-MCNC: 0.63 MG/DL — SIGNIFICANT CHANGE UP (ref 0.5–1.3)
DIFF PNL FLD: ABNORMAL
EGFR: 90 ML/MIN/1.73M2 — SIGNIFICANT CHANGE UP
EOSINOPHIL # BLD AUTO: 0.12 K/UL — SIGNIFICANT CHANGE UP (ref 0–0.5)
EOSINOPHIL NFR BLD AUTO: 1.1 % — SIGNIFICANT CHANGE UP (ref 0–6)
GLUCOSE SERPL-MCNC: 126 MG/DL — HIGH (ref 70–99)
GLUCOSE UR QL: 500 MG/DL
HCT VFR BLD CALC: 41.9 % — SIGNIFICANT CHANGE UP (ref 34.5–45)
HGB BLD-MCNC: 13.6 G/DL — SIGNIFICANT CHANGE UP (ref 11.5–15.5)
IMM GRANULOCYTES NFR BLD AUTO: 0.4 % — SIGNIFICANT CHANGE UP (ref 0–0.9)
KETONES UR-MCNC: ABNORMAL MG/DL
LACTATE SERPL-SCNC: 1 MMOL/L — SIGNIFICANT CHANGE UP (ref 0.5–2)
LEUKOCYTE ESTERASE UR-ACNC: ABNORMAL
LYMPHOCYTES # BLD AUTO: 1.56 K/UL — SIGNIFICANT CHANGE UP (ref 1–3.3)
LYMPHOCYTES # BLD AUTO: 14.6 % — SIGNIFICANT CHANGE UP (ref 13–44)
MCHC RBC-ENTMCNC: 28.7 PG — SIGNIFICANT CHANGE UP (ref 27–34)
MCHC RBC-ENTMCNC: 32.5 GM/DL — SIGNIFICANT CHANGE UP (ref 32–36)
MCV RBC AUTO: 88.4 FL — SIGNIFICANT CHANGE UP (ref 80–100)
MONOCYTES # BLD AUTO: 0.8 K/UL — SIGNIFICANT CHANGE UP (ref 0–0.9)
MONOCYTES NFR BLD AUTO: 7.5 % — SIGNIFICANT CHANGE UP (ref 2–14)
MUCOUS THREADS # UR AUTO: PRESENT
NEUTROPHILS # BLD AUTO: 8.11 K/UL — HIGH (ref 1.8–7.4)
NEUTROPHILS NFR BLD AUTO: 76.1 % — SIGNIFICANT CHANGE UP (ref 43–77)
NITRITE UR-MCNC: POSITIVE
NRBC # BLD: 0 /100 WBCS — SIGNIFICANT CHANGE UP (ref 0–0)
PH UR: 6 — SIGNIFICANT CHANGE UP (ref 5–8)
PLATELET # BLD AUTO: 344 K/UL — SIGNIFICANT CHANGE UP (ref 150–400)
POTASSIUM SERPL-MCNC: 3.7 MMOL/L — SIGNIFICANT CHANGE UP (ref 3.5–5.3)
POTASSIUM SERPL-SCNC: 3.7 MMOL/L — SIGNIFICANT CHANGE UP (ref 3.5–5.3)
PROT UR-MCNC: 30 MG/DL
RBC # BLD: 4.74 M/UL — SIGNIFICANT CHANGE UP (ref 3.8–5.2)
RBC # FLD: 13.1 % — SIGNIFICANT CHANGE UP (ref 10.3–14.5)
RBC CASTS # UR COMP ASSIST: 4 /HPF — SIGNIFICANT CHANGE UP (ref 0–4)
SODIUM SERPL-SCNC: 142 MMOL/L — SIGNIFICANT CHANGE UP (ref 135–145)
SP GR SPEC: 1.03 — SIGNIFICANT CHANGE UP (ref 1–1.03)
SQUAMOUS # UR AUTO: 4 /HPF — SIGNIFICANT CHANGE UP (ref 0–5)
UROBILINOGEN FLD QL: 1 MG/DL — SIGNIFICANT CHANGE UP (ref 0.2–1)
WBC # BLD: 10.66 K/UL — HIGH (ref 3.8–10.5)
WBC # FLD AUTO: 10.66 K/UL — HIGH (ref 3.8–10.5)
WBC UR QL: 18 /HPF — HIGH (ref 0–5)

## 2024-06-30 PROCEDURE — 80048 BASIC METABOLIC PNL TOTAL CA: CPT

## 2024-06-30 PROCEDURE — 96361 HYDRATE IV INFUSION ADD-ON: CPT

## 2024-06-30 PROCEDURE — 99284 EMERGENCY DEPT VISIT MOD MDM: CPT | Mod: 25

## 2024-06-30 PROCEDURE — 81001 URINALYSIS AUTO W/SCOPE: CPT

## 2024-06-30 PROCEDURE — 36415 COLL VENOUS BLD VENIPUNCTURE: CPT

## 2024-06-30 PROCEDURE — 87086 URINE CULTURE/COLONY COUNT: CPT

## 2024-06-30 PROCEDURE — 83605 ASSAY OF LACTIC ACID: CPT

## 2024-06-30 PROCEDURE — 96360 HYDRATION IV INFUSION INIT: CPT | Mod: XU

## 2024-06-30 PROCEDURE — 74177 CT ABD & PELVIS W/CONTRAST: CPT | Mod: 26,MC

## 2024-06-30 PROCEDURE — 99285 EMERGENCY DEPT VISIT HI MDM: CPT

## 2024-06-30 PROCEDURE — 74177 CT ABD & PELVIS W/CONTRAST: CPT | Mod: MC

## 2024-06-30 PROCEDURE — 85025 COMPLETE CBC W/AUTO DIFF WBC: CPT

## 2024-06-30 PROCEDURE — 87186 SC STD MICRODIL/AGAR DIL: CPT

## 2024-06-30 RX ORDER — PEG3350/SOD SULF,BICARB,CL/KCL 240-22.72G
4000 SOLUTION, RECONSTITUTED, ORAL ORAL ONCE
Refills: 0 | Status: COMPLETED | OUTPATIENT
Start: 2024-06-30 | End: 2024-06-30

## 2024-06-30 RX ORDER — METFORMIN HYDROCHLORIDE 850 MG/1
500 TABLET ORAL
Refills: 0 | DISCHARGE

## 2024-06-30 RX ORDER — SODIUM CHLORIDE 0.9 % (FLUSH) 0.9 %
1000 SYRINGE (ML) INJECTION ONCE
Refills: 0 | Status: COMPLETED | OUTPATIENT
Start: 2024-06-30 | End: 2024-06-30

## 2024-06-30 RX ORDER — SULFAMETHOXAZOLE AND TRIMETHOPRIM 800; 160 MG/1; MG/1
1 TABLET ORAL
Qty: 14 | Refills: 0
Start: 2024-06-30 | End: 2024-07-06

## 2024-06-30 RX ORDER — METRONIDAZOLE 500 MG/1
1 TABLET ORAL
Qty: 21 | Refills: 0
Start: 2024-06-30 | End: 2024-07-06

## 2024-06-30 RX ORDER — IOHEXOL 350 MG/ML
30 INJECTION, SOLUTION INTRAVENOUS ONCE
Refills: 0 | Status: COMPLETED | OUTPATIENT
Start: 2024-06-30 | End: 2024-06-30

## 2024-06-30 RX ORDER — SULFAMETHOXAZOLE AND TRIMETHOPRIM 800; 160 MG/1; MG/1
1 TABLET ORAL ONCE
Refills: 0 | Status: COMPLETED | OUTPATIENT
Start: 2024-06-30 | End: 2024-06-30

## 2024-06-30 RX ORDER — EMPAGLIFLOZIN 10 MG/1
1 TABLET, FILM COATED ORAL
Refills: 0 | DISCHARGE

## 2024-06-30 RX ADMIN — SULFAMETHOXAZOLE AND TRIMETHOPRIM 1 TABLET(S): 800; 160 TABLET ORAL at 12:32

## 2024-06-30 RX ADMIN — IOHEXOL 30 MILLILITER(S): 350 INJECTION, SOLUTION INTRAVENOUS at 10:45

## 2024-06-30 RX ADMIN — Medication 1000 MILLILITER(S): at 13:28

## 2024-06-30 RX ADMIN — Medication 1000 MILLILITER(S): at 10:45

## 2024-06-30 RX ADMIN — Medication 4000 MILLILITER(S): at 13:30

## 2024-07-03 LAB
-  AMOXICILLIN/CLAVULANIC ACID: SIGNIFICANT CHANGE UP
-  AMPICILLIN/SULBACTAM: SIGNIFICANT CHANGE UP
-  AMPICILLIN: SIGNIFICANT CHANGE UP
-  CEFAZOLIN: SIGNIFICANT CHANGE UP
-  CEFEPIME: SIGNIFICANT CHANGE UP
-  CEFOXITIN: SIGNIFICANT CHANGE UP
-  CEFTRIAXONE: SIGNIFICANT CHANGE UP
-  CEFUROXIME: SIGNIFICANT CHANGE UP
-  CIPROFLOXACIN: SIGNIFICANT CHANGE UP
-  GENTAMICIN: SIGNIFICANT CHANGE UP
-  LEVOFLOXACIN: SIGNIFICANT CHANGE UP
-  NITROFURANTOIN: SIGNIFICANT CHANGE UP
-  PIPERACILLIN/TAZOBACTAM: SIGNIFICANT CHANGE UP
-  TOBRAMYCIN: SIGNIFICANT CHANGE UP
-  TRIMETHOPRIM/SULFAMETHOXAZOLE: SIGNIFICANT CHANGE UP
CULTURE RESULTS: ABNORMAL
METHOD TYPE: SIGNIFICANT CHANGE UP
ORGANISM # SPEC MICROSCOPIC CNT: ABNORMAL
ORGANISM # SPEC MICROSCOPIC CNT: SIGNIFICANT CHANGE UP
SPECIMEN SOURCE: SIGNIFICANT CHANGE UP

## 2024-07-16 ENCOUNTER — APPOINTMENT (OUTPATIENT)
Dept: ORTHOPEDIC SURGERY | Facility: CLINIC | Age: 79
End: 2024-07-16
Payer: MEDICARE

## 2024-07-16 DIAGNOSIS — M54.16 RADICULOPATHY, LUMBAR REGION: ICD-10-CM

## 2024-07-16 DIAGNOSIS — M48.061 SPINAL STENOSIS, LUMBAR REGION WITHOUT NEUROGENIC CLAUDICATION: ICD-10-CM

## 2024-07-16 PROCEDURE — 99214 OFFICE O/P EST MOD 30 MIN: CPT

## 2024-07-16 PROCEDURE — 99204 OFFICE O/P NEW MOD 45 MIN: CPT

## 2024-07-17 ENCOUNTER — APPOINTMENT (OUTPATIENT)
Dept: HEART AND VASCULAR | Facility: CLINIC | Age: 79
End: 2024-07-17

## 2024-07-30 ENCOUNTER — APPOINTMENT (OUTPATIENT)
Dept: INTERNAL MEDICINE | Facility: CLINIC | Age: 79
End: 2024-07-30

## 2024-08-05 NOTE — BRIEF OPERATIVE NOTE - NS MD BRIEF OP PACING WIRES
Patient/Family Educated On:  [x] HF zones (Green, Yellow, Red) and aware of when to take action   [x] Daily weights  [] Scale provided   [x] Low sodium diet (2000 mg)  Barriers to compliance  [] Refuses to monitor diet  [] Socioeconomic difficulties  [] Unable to cook for self (use of frozen meals, can goods, etc)  [] CHF CHW consulted  [] Low sodium meal delivery options given to patient  [] Dietician consulted   [] Low sodium recipes provided  [] Sodium free seasoning provided   [x] Fluid intake 6-8 cups (around 64 oz)  [x] Reviewed currently prescribed medications with patient, educated on importance of compliance and answered any questions regarding their medication  [] Pill box provided to patient  [] Patient using pill packing pharmacy   [] CPAP/BiPAP use  [] Low impact exercise / cardiac rehab   [] LifeVest use  [x] Patient aware of signs and symptoms of worsening HF, CHF clinic phone number provided and made aware to call clinic for sooner if evaluation if needed     [] Difficulty affording medications  [] CHF CHW consulted  [] Prescription assistance information given   [] [facility] medication assistance program information given   [] Sample medications provided to patient to help bridge gap until affordability N/A            Scheduled to follow up in CHF clinic on:   Future Appointments   Date Time Provider Department Center   11/6/2024  9:30 AM [facility] ROOM 1 [facility] Kim ARMSTRONG               
Ventricular

## 2024-09-03 ENCOUNTER — APPOINTMENT (OUTPATIENT)
Dept: ENDOCRINOLOGY | Facility: CLINIC | Age: 79
End: 2024-09-03
Payer: MEDICARE

## 2024-09-03 VITALS
BODY MASS INDEX: 20.9 KG/M2 | WEIGHT: 107 LBS | HEART RATE: 73 BPM | SYSTOLIC BLOOD PRESSURE: 138 MMHG | DIASTOLIC BLOOD PRESSURE: 84 MMHG

## 2024-09-03 LAB
GLUCOSE BLDC GLUCOMTR-MCNC: 115
HBA1C MFR BLD HPLC: 7

## 2024-09-03 PROCEDURE — G2211 COMPLEX E/M VISIT ADD ON: CPT

## 2024-09-03 PROCEDURE — 83036 HEMOGLOBIN GLYCOSYLATED A1C: CPT | Mod: QW

## 2024-09-03 PROCEDURE — 82962 GLUCOSE BLOOD TEST: CPT

## 2024-09-03 PROCEDURE — 99214 OFFICE O/P EST MOD 30 MIN: CPT

## 2024-09-03 RX ORDER — LANCETS 28 GAUGE
EACH MISCELLANEOUS
Qty: 100 | Refills: 1 | Status: ACTIVE | COMMUNITY
Start: 2024-09-03 | End: 1900-01-01

## 2024-09-03 RX ORDER — BLOOD SUGAR DIAGNOSTIC
STRIP MISCELLANEOUS
Qty: 50 | Refills: 1 | Status: ACTIVE | COMMUNITY
Start: 2024-09-03 | End: 1900-01-01

## 2024-09-03 NOTE — PHYSICAL EXAM
[Alert] : alert [No Acute Distress] : no acute distress [No Proptosis] : no proptosis [No Lid Lag] : no lid lag [Normal Hearing] : hearing was normal [No LAD] : no lymphadenopathy [Thyroid Not Enlarged] : the thyroid was not enlarged [Clear to Auscultation] : lungs were clear to auscultation bilaterally [Normal S1, S2] : normal S1 and S2 [Regular Rhythm] : with a regular rhythm [No Edema] : no peripheral edema [Normal Sensation on Monofilament Testing] : normal sensation on monofilament testing of lower extremities [Normal Affect] : the affect was normal [Normal Mood] : the mood was normal [Acanthosis Nigricans] : no acanthosis nigricans [Foot Ulcers] : no foot ulcers [de-identified] : glucose 115, fasting [de-identified] : decreased pulses  [de-identified] : (+) bunions [de-identified] : dry skin

## 2024-09-03 NOTE — DATA REVIEWED
[FreeTextEntry1] : 7/24  A1c 7.0% tot chol 267, trig 156, HDL 73, , TSH 7.53, GFR 80 1/24  A1c 7.6%, TSH 2.93 10/23  A1c 7.6%, tot chol 229, trig 195, HDL 79, , TSH 7.150 9/23  A1c 8.3%, tot chol 234, trig 175, HDL 83, , TSH 5.87 2/23  urine alb/cr 31

## 2024-09-03 NOTE — HISTORY OF PRESENT ILLNESS
[FreeTextEntry1] : Last here Oct 2023 She did not bring meter and cannot remember any glucose readings, says they are "average." She ran out of levothyroxine 4 months ago;  she has trouble sleeping when not taking it. She does not exercise due to spinal stenosis which affects both her legs (causes pain).  Goes to PT. She has not fallen recently. She  is up to date with amy hinson DR. labs reviewed from 7/24 : A1c 7.0%  , TSH 7.53 (off levothyroxine), GFR 80 She eats cheese 3x/week.  PMH:  CAD s/p CABG x 4, Jan 2023 PAD s/p stent in RLE  Meds metformin 500mg bid, Jardiance 10mg valsartan 80mg bid clopidogrel, aspirin, Repatha (usually late with her dose), Vascepa omeprazole, takes every day

## 2024-09-03 NOTE — ASSESSMENT
[FreeTextEntry1] : Diabetes,  A1c  at goal.  Goal A1c  < 7.5%. continue metformin 500mg bid and Jardiance 10mg. on PCSK9 inhibitor for lipids  Hypothyroid, likely due to Hashimoto's. Restart levothyroxine 50mcg/day.  TSH was normal in January when she was taking levothyroxine.  Resend for bone density.  I recommend that she start taking calcium citrate (since she is on PPI), 600mg/day. RTO 6 months

## 2024-09-04 LAB
CREAT SPEC-SCNC: 21 MG/DL
MICROALBUMIN 24H UR DL<=1MG/L-MCNC: <1.2 MG/DL
MICROALBUMIN/CREAT 24H UR-RTO: NORMAL MG/G

## 2024-11-02 NOTE — PATIENT PROFILE ADULT - IS PATIENT POST-MENOPAUSAL?
Please follow-up with your son's pediatrician in regards to his ongoing cough.  His workup today was good and he had reassuring physical exam findings.  He tested negative for COVID, flu, RSV while here.  The chest x-ray performed did not show any evidence of pneumonia.    Return if he has a worsening of his symptoms or develops new onset of chest pain, shortness of breath, or is otherwise feeling worse.  
yes

## 2025-03-03 ENCOUNTER — APPOINTMENT (OUTPATIENT)
Dept: ENDOCRINOLOGY | Facility: CLINIC | Age: 80
End: 2025-03-03

## 2025-04-28 ENCOUNTER — APPOINTMENT (OUTPATIENT)
Dept: ENDOCRINOLOGY | Facility: CLINIC | Age: 80
End: 2025-04-28

## 2025-07-15 ENCOUNTER — NON-APPOINTMENT (OUTPATIENT)
Age: 80
End: 2025-07-15

## 2025-07-15 ENCOUNTER — APPOINTMENT (OUTPATIENT)
Dept: ENDOCRINOLOGY | Facility: CLINIC | Age: 80
End: 2025-07-15
Payer: MEDICARE

## 2025-07-15 VITALS
WEIGHT: 106 LBS | SYSTOLIC BLOOD PRESSURE: 128 MMHG | BODY MASS INDEX: 20.7 KG/M2 | DIASTOLIC BLOOD PRESSURE: 79 MMHG | HEART RATE: 70 BPM

## 2025-07-15 LAB
GLUCOSE BLDC GLUCOMTR-MCNC: 126
HBA1C MFR BLD HPLC: 7.2

## 2025-07-15 PROCEDURE — G2211 COMPLEX E/M VISIT ADD ON: CPT

## 2025-07-15 PROCEDURE — 83036 HEMOGLOBIN GLYCOSYLATED A1C: CPT | Mod: QW

## 2025-07-15 PROCEDURE — 99214 OFFICE O/P EST MOD 30 MIN: CPT

## 2025-07-15 PROCEDURE — 82962 GLUCOSE BLOOD TEST: CPT

## 2025-07-15 RX ORDER — REPAGLINIDE 1 MG/1
1 TABLET ORAL
Qty: 90 | Refills: 1 | Status: ACTIVE | COMMUNITY
Start: 2025-07-15 | End: 1900-01-01

## (undated) DEVICE — GLV 7.5 PROTEXIS (WHITE)

## (undated) DEVICE — SUT VICRYL 1 36" CTX UNDYED

## (undated) DEVICE — DRSG ACE BANDAGE 6"

## (undated) DEVICE — CHEST DRAIN PLEUR-EVAC DRY/WET ADULT-PEDS SINGLE (QUICK)

## (undated) DEVICE — ELCTR REM POLYHESIVE ADULT PT RETURN 15FT

## (undated) DEVICE — SUT SILK 2-0 30" SH

## (undated) DEVICE — PREP SCRUB BRUSH W CHG 4%

## (undated) DEVICE — BLADE SCALPEL SAFETY #10 WITH PLASTIC GREEN HANDLE

## (undated) DEVICE — GLV 7 PROTEXIS (WHITE)

## (undated) DEVICE — CATH IV SAFE BC 24G X 0.75" (YELLOW)

## (undated) DEVICE — Device

## (undated) DEVICE — GAUZE SPONGE 12PLY DMT MT 8X4

## (undated) DEVICE — TUBING STRYKER PNEUMOSURE HI FLOW INSUFFLATOR

## (undated) DEVICE — SUT PROLENE 7-0 24" BV-1

## (undated) DEVICE — GLV 6 PROTEXIS (WHITE)

## (undated) DEVICE — SUT MONOCRYL 4-0 27" PS-2 UNDYED

## (undated) DEVICE — BLADE SCALPEL SAFETY #11 WITH PLASTIC GREEN HANDLE

## (undated) DEVICE — SUT PROLENE 6-0 30" RB-2

## (undated) DEVICE — SUT SILK 0 18" CT-1 (POP-OFF)

## (undated) DEVICE — PACK PROC CV DRAPE

## (undated) DEVICE — ELCTR BOVIE TIP BLADE INSULATED 3" EDGE

## (undated) DEVICE — PACING CABLE (BLUE) ATRIAL TEMP SCREW DOWN 12FT

## (undated) DEVICE — SUT PROLENE 8-0 24" BV175-6

## (undated) DEVICE — VASOVIEW HEMOPRO ENDO SYSTEM

## (undated) DEVICE — DRAPE PROBE COVER 5" X 96"

## (undated) DEVICE — LAP PAD 18 X 18"

## (undated) DEVICE — NDL BLUNT 18G LOOP VESSEL MAXI WHITE

## (undated) DEVICE — ELCTR STRYKER EXTENSION SUCTION TIP 125MM

## (undated) DEVICE — ACROBAT I-STABILIZER

## (undated) DEVICE — SUMP INTRACARDIAC/PERICARDIAL 20FR 1/4" ADULT

## (undated) DEVICE — FOLEY TRAY 16FR 5CC LF LUBRISIL ADVANCE TEMP CLOSED

## (undated) DEVICE — DRAPE FLUID WARMER 44 X 66"

## (undated) DEVICE — TUBING SMOKE EVAC 3/8" X 10FT FOR NEPTUNE

## (undated) DEVICE — SUT ETHIBOND 0 18" TIES

## (undated) DEVICE — POSITIONER FOAM EGG CRATE ULNAR 2PCS (PINK)

## (undated) DEVICE — ACROBAT I-POSITIONER

## (undated) DEVICE — ELCTR BOVIE TIP BLADE INSULATED 4" EDGE

## (undated) DEVICE — SUT PROLENE 3-0 36" SH-1

## (undated) DEVICE — DRAPE TOWEL BLUE 17" X 24"

## (undated) DEVICE — AROS VESSEL CLAMP SINGLE LARGE (YELLOW) 120G

## (undated) DEVICE — SUT ETHIBOND 4-0 12-18"

## (undated) DEVICE — DRAPE TOWEL WHITE 17" X 24"

## (undated) DEVICE — SUT SILK 2-0 18" SH (POP-OFF)

## (undated) DEVICE — SUT NUROLON 1 18" OS-8 (POP-OFF)

## (undated) DEVICE — DRAPE PROBE COVER LATEX FREE 3X96"

## (undated) DEVICE — PACK OPEN HEART LNX

## (undated) DEVICE — SUT VICRYL PLUS 0 27" CT

## (undated) DEVICE — GLV 6.5 PROTEXIS (WHITE)

## (undated) DEVICE — SUT DOUBLE 6 WIRE STERNAL

## (undated) DEVICE — SUT PROLENE 7-0 24" BV175-6

## (undated) DEVICE — GETINGE VASOVIEW 7 ENDOSCOPIC VESSEL HARVESTING SYSTEM

## (undated) DEVICE — DRSG ALLEVYN LIFE 6 X 6 (PINK)

## (undated) DEVICE — DRAPE IOBAN 33" X 23"

## (undated) DEVICE — SYNOVIS VASCULAR PROBE 1.5MM 15CM

## (undated) DEVICE — SUT VICRYL 0 27" CT

## (undated) DEVICE — DRSG TRACH DRAINAGE 4X4

## (undated) DEVICE — DRSG MEPILEX 10 X 25CM (4 X 10") AG

## (undated) DEVICE — DRSG BIOPATCH DISK W CHG 1" W 4.0MM HOLE

## (undated) DEVICE — SUT PROLENE BV 130-5 8-0

## (undated) DEVICE — WARMING BLANKET FULL ADULT

## (undated) DEVICE — KNIFE ALCON STANDARD FULL HANDLE 15 DEG (PINK)

## (undated) DEVICE — TUBING BRAT 2 SUCTION ASSEMBLY TWIST LOCK

## (undated) DEVICE — BLOWER MISTER AXIUS WITH IV SET

## (undated) DEVICE — GOWN TRIMAX XXL

## (undated) DEVICE — TUBING STRYKER PNEUMOCLEAR HIGH FLOW

## (undated) DEVICE — PACING CABLE (BROWN) A/V TEMP SCREW DOWN 12FT

## (undated) DEVICE — ELCTR STRYKER NEPTUNE SMOKE EVACUATION PENCIL (GREEN)

## (undated) DEVICE — SUT VICRYL 2-0 27" CT-1

## (undated) DEVICE — DRSG DERMABOND 0.7ML

## (undated) DEVICE — SUCTION CATH ARGYLE WHISTLE TIP 14FR STRAIGHT PACKED

## (undated) DEVICE — SUT STAINLESS STEEL 6 4-18" CCS

## (undated) DEVICE — CATH CV TRAY INSR ST UNIV

## (undated) DEVICE — BLADE ETHICON HARMONIC SYNERGY HOOK TIP 3MM 4CM-9CM

## (undated) DEVICE — SOL ANTI FOG

## (undated) DEVICE — DRSG ACE BANDAGE 6" LF STERILE

## (undated) DEVICE — CATH NG SALEM SUMP 16FR

## (undated) DEVICE — CATH TRIOX OXIMETRY 8F 3 LUMENS